# Patient Record
Sex: FEMALE | Race: WHITE | NOT HISPANIC OR LATINO | Employment: OTHER | URBAN - METROPOLITAN AREA
[De-identification: names, ages, dates, MRNs, and addresses within clinical notes are randomized per-mention and may not be internally consistent; named-entity substitution may affect disease eponyms.]

---

## 2023-03-06 ENCOUNTER — TELEPHONE (OUTPATIENT)
Dept: PRIMARY CARE | Facility: CLINIC | Age: 73
End: 2023-03-06
Payer: MEDICARE

## 2023-03-27 NOTE — TELEPHONE ENCOUNTER
3/27- can you please on rx pad write appeal at top with fax number [phone] write pt  and name and PA Ref# [id number] in the body write -- please approve trelegy 100mcg pt has been on since 9/3/2020 is working great for DX J44.9 using albuterol HFA as needed. Changing therapy will interfere with pts treatment. Please ok for pt.  Have  sign and fax pls thanks a ton       Pt needs refill for Trelegy  Per pharmacy put tier exception

## 2023-03-28 PROBLEM — R92.8 ABNORMAL MAMMOGRAM: Status: ACTIVE | Noted: 2023-03-28

## 2023-03-28 PROBLEM — R73.9 HYPERGLYCEMIA: Status: ACTIVE | Noted: 2023-03-28

## 2023-03-28 PROBLEM — J43.9 EMPHYSEMATOUS COPD (MULTI): Status: ACTIVE | Noted: 2023-03-28

## 2023-03-28 PROBLEM — E78.00 ELEVATED LDL CHOLESTEROL LEVEL: Status: ACTIVE | Noted: 2023-03-28

## 2023-03-28 PROBLEM — F41.9 ANXIETY: Status: ACTIVE | Noted: 2023-03-28

## 2023-03-28 PROBLEM — M81.0 OSTEOPOROSIS: Status: ACTIVE | Noted: 2023-03-28

## 2023-03-28 PROBLEM — R73.09 ABNORMAL BLOOD SUGAR: Status: ACTIVE | Noted: 2023-03-28

## 2023-03-28 PROBLEM — I10 BENIGN ESSENTIAL HYPERTENSION: Status: ACTIVE | Noted: 2023-03-28

## 2023-03-28 PROBLEM — J30.9 AR (ALLERGIC RHINITIS): Status: ACTIVE | Noted: 2023-03-28

## 2023-03-28 PROBLEM — M75.52 BURSITIS OF LEFT SHOULDER: Status: ACTIVE | Noted: 2023-03-28

## 2023-03-28 PROBLEM — M54.9 BACK PAIN: Status: ACTIVE | Noted: 2023-03-28

## 2023-03-28 PROBLEM — K21.9 GASTROESOPHAGEAL REFLUX DISEASE: Status: ACTIVE | Noted: 2023-03-28

## 2023-03-28 PROBLEM — I10 CHRONIC HYPERTENSION: Status: ACTIVE | Noted: 2023-03-28

## 2023-03-28 PROBLEM — J44.9 COPD (CHRONIC OBSTRUCTIVE PULMONARY DISEASE) (MULTI): Status: ACTIVE | Noted: 2023-03-28

## 2023-03-28 PROBLEM — R12 HEARTBURN: Status: ACTIVE | Noted: 2023-03-28

## 2023-03-28 RX ORDER — FLUTICASONE PROPIONATE 50 MCG
2 SPRAY, SUSPENSION (ML) NASAL DAILY
COMMUNITY
End: 2023-12-04 | Stop reason: SDUPTHER

## 2023-03-28 RX ORDER — ALBUTEROL SULFATE 90 UG/1
2 AEROSOL, METERED RESPIRATORY (INHALATION) 4 TIMES DAILY
COMMUNITY
Start: 2020-08-27

## 2023-03-28 RX ORDER — DENOSUMAB 60 MG/ML
60 INJECTION SUBCUTANEOUS
COMMUNITY
Start: 2019-02-06

## 2023-03-28 RX ORDER — FLUTICASONE FUROATE, UMECLIDINIUM BROMIDE AND VILANTEROL TRIFENATATE 100; 62.5; 25 UG/1; UG/1; UG/1
1 POWDER RESPIRATORY (INHALATION)
COMMUNITY
Start: 2020-09-03 | End: 2024-01-16

## 2023-03-29 ENCOUNTER — TELEMEDICINE (OUTPATIENT)
Dept: PRIMARY CARE | Facility: CLINIC | Age: 73
End: 2023-03-29
Payer: MEDICARE

## 2023-03-29 VITALS — HEIGHT: 62 IN | BODY MASS INDEX: 21.71 KG/M2 | WEIGHT: 118 LBS

## 2023-03-29 DIAGNOSIS — J01.10 ACUTE FRONTAL SINUSITIS, RECURRENCE NOT SPECIFIED: Primary | ICD-10-CM

## 2023-03-29 PROCEDURE — 99214 OFFICE O/P EST MOD 30 MIN: CPT | Performed by: NURSE PRACTITIONER

## 2023-03-29 RX ORDER — DOXYCYCLINE 100 MG/1
100 CAPSULE ORAL 2 TIMES DAILY
Qty: 20 CAPSULE | Refills: 0 | Status: SHIPPED | OUTPATIENT
Start: 2023-03-29 | End: 2023-04-08

## 2023-03-29 RX ORDER — CODEINE PHOSPHATE AND GUAIFENESIN 10; 100 MG/5ML; MG/5ML
5 SOLUTION ORAL NIGHTLY PRN
Qty: 50 ML | Refills: 0 | Status: SHIPPED | OUTPATIENT
Start: 2023-03-29 | End: 2023-04-03

## 2023-03-29 ASSESSMENT — ENCOUNTER SYMPTOMS
RHINORRHEA: 1
SWEATS: 0
MYALGIAS: 0
SORE THROAT: 0
FEVER: 0
COUGH: 1
CHILLS: 0
HEMOPTYSIS: 0
HEADACHES: 1
SHORTNESS OF BREATH: 1
HEARTBURN: 0
WHEEZING: 1
WEIGHT LOSS: 0

## 2023-03-29 NOTE — PROGRESS NOTES
"Subjective   Patient ID: Humera Villegas is a 72 y.o. female who is present for virtual visit (audio; unable to get video visit to work) for Nasal Congestion and Cough.    Humera is having nasal congestion, cough, headache and productive cough for the past one week. Stated cough is productive of thick yellow phlegm. She has tried Zyrtec for her runny nose without any improvement.     She is requesting short prescription for cough syrup with Codeine as she is not sleeping during the night secondary to cough.     Cough  This is a new problem. The current episode started in the past 7 days. The problem has been unchanged. The problem occurs hourly. The cough is Productive of sputum. Associated symptoms include headaches, nasal congestion, postnasal drip, rhinorrhea, shortness of breath and wheezing. Pertinent negatives include no chest pain, chills, ear congestion, ear pain, fever, heartburn, hemoptysis, myalgias, rash, sore throat, sweats or weight loss. The symptoms are aggravated by lying down.        Review of Systems   Constitutional:  Negative for chills, fever and weight loss.   HENT:  Positive for postnasal drip and rhinorrhea. Negative for ear pain and sore throat.    Respiratory:  Positive for cough, shortness of breath and wheezing. Negative for hemoptysis.    Cardiovascular:  Negative for chest pain.   Gastrointestinal:  Negative for heartburn.   Musculoskeletal:  Negative for myalgias.   Skin:  Negative for rash.   Neurological:  Positive for headaches.       Objective   Ht 1.575 m (5' 2\")   Wt 53.5 kg (118 lb)   BMI 21.58 kg/m²     Physical Exam  Neurological:      Mental Status: She is alert.   Psychiatric:         Thought Content: Thought content normal.         Judgment: Judgment normal.         Assessment/Plan   Problem List Items Addressed This Visit    None  Visit Diagnoses       Acute frontal sinusitis, recurrence not specified    -  Primary    Relevant Medications    doxycycline (Vibramycin) 100 mg " capsule    codeine-guaifenesin (Robitussin-AC)  mg/5 mL syrup          1) Acute sinusitis: you were prescribed Doxycycline and codeine cough syrup. You were instructed to stay well hydrated with fluids. Recommend OTC saline nasal spray, antihistamine such as Cetirizine or Loratadine and cool mist humidifier at bedtime. Instructed you on good hand hygiene. May take OTC Tylenol Extra Strength or NSAIDS for fever, discomfort or pain. Follow-up if no improvement or if symptoms worsen.

## 2023-04-13 ENCOUNTER — TELEPHONE (OUTPATIENT)
Dept: PRIMARY CARE | Facility: CLINIC | Age: 73
End: 2023-04-13
Payer: MEDICARE

## 2023-05-16 ENCOUNTER — OFFICE VISIT (OUTPATIENT)
Dept: PRIMARY CARE | Facility: CLINIC | Age: 73
End: 2023-05-16
Payer: MEDICARE

## 2023-05-16 VITALS
DIASTOLIC BLOOD PRESSURE: 78 MMHG | BODY MASS INDEX: 21.35 KG/M2 | SYSTOLIC BLOOD PRESSURE: 130 MMHG | WEIGHT: 116 LBS | HEIGHT: 62 IN

## 2023-05-16 DIAGNOSIS — Z00.00 ROUTINE GENERAL MEDICAL EXAMINATION AT HEALTH CARE FACILITY: ICD-10-CM

## 2023-05-16 DIAGNOSIS — Z87.891 FORMER SMOKER: ICD-10-CM

## 2023-05-16 DIAGNOSIS — Z13.89 SCREENING FOR MULTIPLE CONDITIONS: ICD-10-CM

## 2023-05-16 DIAGNOSIS — M81.0 MENOPAUSAL OSTEOPOROSIS: ICD-10-CM

## 2023-05-16 DIAGNOSIS — Z12.31 ENCOUNTER FOR SCREENING MAMMOGRAM FOR BREAST CANCER: ICD-10-CM

## 2023-05-16 DIAGNOSIS — E78.00 ELEVATED LDL CHOLESTEROL LEVEL: ICD-10-CM

## 2023-05-16 DIAGNOSIS — Z12.31 VISIT FOR SCREENING MAMMOGRAM: Primary | ICD-10-CM

## 2023-05-16 DIAGNOSIS — K21.9 GASTROESOPHAGEAL REFLUX DISEASE WITHOUT ESOPHAGITIS: ICD-10-CM

## 2023-05-16 DIAGNOSIS — J43.2 CENTRILOBULAR EMPHYSEMA (MULTI): ICD-10-CM

## 2023-05-16 DIAGNOSIS — I10 BENIGN ESSENTIAL HYPERTENSION: ICD-10-CM

## 2023-05-16 PROBLEM — N76.3 CHRONIC VULVITIS: Status: ACTIVE | Noted: 2017-07-06

## 2023-05-16 PROCEDURE — 1036F TOBACCO NON-USER: CPT | Performed by: FAMILY MEDICINE

## 2023-05-16 PROCEDURE — 3078F DIAST BP <80 MM HG: CPT | Performed by: FAMILY MEDICINE

## 2023-05-16 PROCEDURE — 1159F MED LIST DOCD IN RCRD: CPT | Performed by: FAMILY MEDICINE

## 2023-05-16 PROCEDURE — 1160F RVW MEDS BY RX/DR IN RCRD: CPT | Performed by: FAMILY MEDICINE

## 2023-05-16 PROCEDURE — 1170F FXNL STATUS ASSESSED: CPT | Performed by: FAMILY MEDICINE

## 2023-05-16 PROCEDURE — G0444 DEPRESSION SCREEN ANNUAL: HCPCS | Performed by: FAMILY MEDICINE

## 2023-05-16 PROCEDURE — 99214 OFFICE O/P EST MOD 30 MIN: CPT | Performed by: FAMILY MEDICINE

## 2023-05-16 PROCEDURE — 3075F SYST BP GE 130 - 139MM HG: CPT | Performed by: FAMILY MEDICINE

## 2023-05-16 PROCEDURE — G0439 PPPS, SUBSEQ VISIT: HCPCS | Performed by: FAMILY MEDICINE

## 2023-05-21 ASSESSMENT — ACTIVITIES OF DAILY LIVING (ADL)
DOING_HOUSEWORK: INDEPENDENT
TAKING_MEDICATION: INDEPENDENT
GROCERY_SHOPPING: INDEPENDENT
DRESSING: INDEPENDENT
BATHING: INDEPENDENT
MANAGING_FINANCES: INDEPENDENT

## 2023-05-21 ASSESSMENT — ENCOUNTER SYMPTOMS
PSYCHIATRIC NEGATIVE: 1
VOMITING: 0
CHILLS: 0
NEUROLOGICAL NEGATIVE: 1
ENDOCRINE NEGATIVE: 1
HEMATOLOGIC/LYMPHATIC NEGATIVE: 1
RESPIRATORY NEGATIVE: 1
MUSCULOSKELETAL NEGATIVE: 1
FEVER: 0
ALLERGIC/IMMUNOLOGIC NEGATIVE: 1
NAUSEA: 0
GASTROINTESTINAL NEGATIVE: 1
CARDIOVASCULAR NEGATIVE: 1

## 2023-05-21 ASSESSMENT — PATIENT HEALTH QUESTIONNAIRE - PHQ9
2. FEELING DOWN, DEPRESSED OR HOPELESS: NOT AT ALL
SUM OF ALL RESPONSES TO PHQ9 QUESTIONS 1 AND 2: 0
1. LITTLE INTEREST OR PLEASURE IN DOING THINGS: NOT AT ALL

## 2023-05-21 NOTE — PROGRESS NOTES
"Subjective   Reason for Visit: Humera Villegas is an 72 y.o. female here for a Medicare Wellness visit.          Reviewed all medications by prescribing practitioner or clinical pharmacist (such as prescriptions, OTCs, herbal therapies and supplements) and documented in the medical record.    HPI patient is here for follow-up  Has had a hard time getting trilogy very expensive and current insurance      Patient Care Team:  Fabio Chou MD as PCP - General     Review of Systems   Constitutional:  Negative for chills and fever.   HENT: Negative.     Respiratory: Negative.     Cardiovascular: Negative.    Gastrointestinal: Negative.  Negative for nausea and vomiting.   Endocrine: Negative.    Genitourinary: Negative.    Musculoskeletal: Negative.    Skin: Negative.  Negative for rash.   Allergic/Immunologic: Negative.    Neurological: Negative.    Hematological: Negative.    Psychiatric/Behavioral: Negative.     All other systems reviewed and are negative.      Objective   Vitals:  Blood Pressure 130/78   Height 1.575 m (5' 2\")   Weight 52.6 kg (116 lb)   Body Mass Index 21.22 kg/m²       Physical Exam  Vitals and nursing note reviewed.   Constitutional:       Appearance: Normal appearance.   HENT:      Head: Normocephalic and atraumatic.      Right Ear: Tympanic membrane normal.      Left Ear: Tympanic membrane normal.      Nose: Nose normal.      Mouth/Throat:      Mouth: Mucous membranes are moist.   Eyes:      Pupils: Pupils are equal, round, and reactive to light.   Cardiovascular:      Rate and Rhythm: Normal rate and regular rhythm.      Pulses: Normal pulses.      Heart sounds: Normal heart sounds.   Pulmonary:      Effort: Pulmonary effort is normal.      Breath sounds: Normal breath sounds.   Abdominal:      General: Abdomen is flat. Bowel sounds are normal.      Palpations: Abdomen is soft.   Musculoskeletal:         General: Normal range of motion.      Cervical back: Normal range of motion and neck " "supple.   Skin:     General: Skin is warm and dry.      Capillary Refill: Capillary refill takes less than 2 seconds.   Neurological:      General: No focal deficit present.      Mental Status: She is alert and oriented to person, place, and time.   Psychiatric:         Mood and Affect: Mood normal.         Assessment/Plan   Problem List Items Addressed This Visit       Benign essential hypertension    Current Assessment & Plan     Continue medication         COPD (chronic obstructive pulmonary disease) (CMS/[facility])    Current Assessment & Plan     Continue inhalers         Gastroesophageal reflux disease    Elevated LDL cholesterol level    Menopausal osteoporosis     Other Visit Diagnoses       Visit for screening mammogram    -  Primary    Relevant Orders    BI mammo bilateral screening tomosynthesis    Screening for multiple conditions        Former smoker        Encounter for screening mammogram for breast cancer        Routine general medical examination at health care facility               spent 15 minutes obtaining and discussing depression screening using PHQ-2 questions with results documented in chart.”  (If screen positive: \"The screen indicated potential depression and PHQ-9 was obtained with treatment and referral plan discussed      " normal range of motion

## 2023-05-25 ENCOUNTER — LAB (OUTPATIENT)
Dept: LAB | Facility: LAB | Age: 73
End: 2023-05-25
Payer: MEDICARE

## 2023-05-25 DIAGNOSIS — M81.0 AGE-RELATED OSTEOPOROSIS WITHOUT CURRENT PATHOLOGICAL FRACTURE: ICD-10-CM

## 2023-05-25 DIAGNOSIS — I10 ESSENTIAL (PRIMARY) HYPERTENSION: ICD-10-CM

## 2023-05-25 DIAGNOSIS — E78.00 ELEVATED LDL CHOLESTEROL LEVEL: ICD-10-CM

## 2023-05-25 DIAGNOSIS — I10 BENIGN ESSENTIAL HYPERTENSION: ICD-10-CM

## 2023-05-25 DIAGNOSIS — R73.09 ABNORMAL BLOOD SUGAR: ICD-10-CM

## 2023-05-25 DIAGNOSIS — I10 BENIGN ESSENTIAL HYPERTENSION: Primary | ICD-10-CM

## 2023-05-25 LAB
ALANINE AMINOTRANSFERASE (SGPT) (U/L) IN SER/PLAS: 14 U/L (ref 7–45)
ALBUMIN (G/DL) IN SER/PLAS: 4.2 G/DL (ref 3.4–5)
ALKALINE PHOSPHATASE (U/L) IN SER/PLAS: 57 U/L (ref 33–136)
ANION GAP IN SER/PLAS: 17 MMOL/L (ref 10–20)
ASPARTATE AMINOTRANSFERASE (SGOT) (U/L) IN SER/PLAS: 20 U/L (ref 9–39)
BILIRUBIN TOTAL (MG/DL) IN SER/PLAS: 0.9 MG/DL (ref 0–1.2)
CALCIUM (MG/DL) IN SER/PLAS: 10.4 MG/DL (ref 8.6–10.6)
CARBON DIOXIDE, TOTAL (MMOL/L) IN SER/PLAS: 23 MMOL/L (ref 21–32)
CHLORIDE (MMOL/L) IN SER/PLAS: 102 MMOL/L (ref 98–107)
CHOLESTEROL (MG/DL) IN SER/PLAS: 184 MG/DL (ref 0–199)
CHOLESTEROL IN HDL (MG/DL) IN SER/PLAS: 75.1 MG/DL
CHOLESTEROL/HDL RATIO: 2.5
COBALAMIN (VITAMIN B12) (PG/ML) IN SER/PLAS: 368 PG/ML (ref 211–911)
CREATININE (MG/DL) IN SER/PLAS: 0.75 MG/DL (ref 0.5–1.05)
ERYTHROCYTE DISTRIBUTION WIDTH (RATIO) BY AUTOMATED COUNT: 13.6 % (ref 11.5–14.5)
ERYTHROCYTE MEAN CORPUSCULAR HEMOGLOBIN CONCENTRATION (G/DL) BY AUTOMATED: 32.4 G/DL (ref 32–36)
ERYTHROCYTE MEAN CORPUSCULAR VOLUME (FL) BY AUTOMATED COUNT: 98 FL (ref 80–100)
ERYTHROCYTES (10*6/UL) IN BLOOD BY AUTOMATED COUNT: 4.74 X10E12/L (ref 4–5.2)
ESTIMATED AVERAGE GLUCOSE FOR HBA1C: 131 MG/DL
GFR FEMALE: 83 ML/MIN/1.73M2
GLUCOSE (MG/DL) IN SER/PLAS: 99 MG/DL (ref 74–99)
HEMATOCRIT (%) IN BLOOD BY AUTOMATED COUNT: 46.6 % (ref 36–46)
HEMOGLOBIN (G/DL) IN BLOOD: 15.1 G/DL (ref 12–16)
HEMOGLOBIN A1C/HEMOGLOBIN TOTAL IN BLOOD: 6.2 %
LDL: 90 MG/DL (ref 0–99)
LEUKOCYTES (10*3/UL) IN BLOOD BY AUTOMATED COUNT: 5.7 X10E9/L (ref 4.4–11.3)
NRBC (PER 100 WBCS) BY AUTOMATED COUNT: 0 /100 WBC (ref 0–0)
PLATELETS (10*3/UL) IN BLOOD AUTOMATED COUNT: 200 X10E9/L (ref 150–450)
POTASSIUM (MMOL/L) IN SER/PLAS: 4.5 MMOL/L (ref 3.5–5.3)
PROTEIN TOTAL: 8.2 G/DL (ref 6.4–8.2)
SODIUM (MMOL/L) IN SER/PLAS: 137 MMOL/L (ref 136–145)
TRIGLYCERIDE (MG/DL) IN SER/PLAS: 96 MG/DL (ref 0–149)
UREA NITROGEN (MG/DL) IN SER/PLAS: 14 MG/DL (ref 6–23)
VLDL: 19 MG/DL (ref 0–40)

## 2023-05-25 PROCEDURE — 82607 VITAMIN B-12: CPT

## 2023-05-25 PROCEDURE — 83036 HEMOGLOBIN GLYCOSYLATED A1C: CPT

## 2023-05-25 PROCEDURE — 85027 COMPLETE CBC AUTOMATED: CPT

## 2023-05-25 PROCEDURE — 80053 COMPREHEN METABOLIC PANEL: CPT

## 2023-05-25 PROCEDURE — 80061 LIPID PANEL: CPT

## 2023-05-25 PROCEDURE — 36415 COLL VENOUS BLD VENIPUNCTURE: CPT

## 2023-07-19 DIAGNOSIS — I10 HYPERTENSION, UNSPECIFIED TYPE: ICD-10-CM

## 2023-07-19 DIAGNOSIS — E78.5 DYSLIPIDEMIA: ICD-10-CM

## 2023-07-20 RX ORDER — ATORVASTATIN CALCIUM 20 MG/1
20 TABLET, FILM COATED ORAL DAILY
Qty: 90 TABLET | Refills: 1 | Status: SHIPPED | OUTPATIENT
Start: 2023-07-20 | End: 2023-12-12

## 2023-07-20 RX ORDER — AMLODIPINE BESYLATE 2.5 MG/1
2.5 TABLET ORAL DAILY
Qty: 90 TABLET | Refills: 1 | Status: SHIPPED | OUTPATIENT
Start: 2023-07-20 | End: 2023-12-12

## 2023-08-30 DIAGNOSIS — K21.00 GASTROESOPHAGEAL REFLUX DISEASE WITH ESOPHAGITIS, UNSPECIFIED WHETHER HEMORRHAGE: ICD-10-CM

## 2023-08-31 RX ORDER — OMEPRAZOLE 20 MG/1
20 CAPSULE, DELAYED RELEASE ORAL DAILY
Qty: 90 CAPSULE | Refills: 1 | Status: SHIPPED | OUTPATIENT
Start: 2023-08-31 | End: 2024-01-16

## 2023-11-21 ENCOUNTER — APPOINTMENT (OUTPATIENT)
Dept: PRIMARY CARE | Facility: CLINIC | Age: 73
End: 2023-11-21
Payer: MEDICARE

## 2023-12-04 ENCOUNTER — OFFICE VISIT (OUTPATIENT)
Dept: PRIMARY CARE | Facility: CLINIC | Age: 73
End: 2023-12-04
Payer: MEDICARE

## 2023-12-04 VITALS
WEIGHT: 117 LBS | HEART RATE: 114 BPM | SYSTOLIC BLOOD PRESSURE: 137 MMHG | DIASTOLIC BLOOD PRESSURE: 78 MMHG | BODY MASS INDEX: 21.4 KG/M2

## 2023-12-04 DIAGNOSIS — S39.012A STRAIN OF LUMBAR REGION, INITIAL ENCOUNTER: Primary | ICD-10-CM

## 2023-12-04 DIAGNOSIS — J30.9 ALLERGIC RHINITIS, UNSPECIFIED SEASONALITY, UNSPECIFIED TRIGGER: ICD-10-CM

## 2023-12-04 DIAGNOSIS — E78.00 ELEVATED LDL CHOLESTEROL LEVEL: ICD-10-CM

## 2023-12-04 DIAGNOSIS — Z12.31 VISIT FOR SCREENING MAMMOGRAM: ICD-10-CM

## 2023-12-04 DIAGNOSIS — I10 BENIGN ESSENTIAL HYPERTENSION: ICD-10-CM

## 2023-12-04 DIAGNOSIS — R01.1 SYSTOLIC EJECTION MURMUR: ICD-10-CM

## 2023-12-04 DIAGNOSIS — R09.89 BILATERAL CAROTID BRUITS: ICD-10-CM

## 2023-12-04 DIAGNOSIS — R73.09 ABNORMAL BLOOD SUGAR: ICD-10-CM

## 2023-12-04 LAB
ALBUMIN SERPL BCP-MCNC: 4.4 G/DL (ref 3.4–5)
ALP SERPL-CCNC: 53 U/L (ref 33–136)
ALT SERPL W P-5'-P-CCNC: 17 U/L (ref 7–45)
ANION GAP SERPL CALC-SCNC: 17 MMOL/L (ref 10–20)
AST SERPL W P-5'-P-CCNC: 22 U/L (ref 9–39)
BILIRUB SERPL-MCNC: 0.8 MG/DL (ref 0–1.2)
BUN SERPL-MCNC: 21 MG/DL (ref 6–23)
CALCIUM SERPL-MCNC: 9.9 MG/DL (ref 8.6–10.6)
CHLORIDE SERPL-SCNC: 101 MMOL/L (ref 98–107)
CHOLEST SERPL-MCNC: 193 MG/DL (ref 0–199)
CHOLESTEROL/HDL RATIO: 2.4
CO2 SERPL-SCNC: 21 MMOL/L (ref 21–32)
CREAT SERPL-MCNC: 0.81 MG/DL (ref 0.5–1.05)
GFR SERPL CREATININE-BSD FRML MDRD: 76 ML/MIN/1.73M*2
GLUCOSE SERPL-MCNC: 138 MG/DL (ref 74–99)
HDLC SERPL-MCNC: 78.9 MG/DL
LDLC SERPL CALC-MCNC: 99 MG/DL
NON HDL CHOLESTEROL: 114 MG/DL (ref 0–149)
POC HEMOGLOBIN A1C: 5.9 % (ref 4.2–6.5)
POTASSIUM SERPL-SCNC: 4.2 MMOL/L (ref 3.5–5.3)
PROT SERPL-MCNC: 7.7 G/DL (ref 6.4–8.2)
SODIUM SERPL-SCNC: 135 MMOL/L (ref 136–145)
TRIGL SERPL-MCNC: 78 MG/DL (ref 0–149)
VLDL: 16 MG/DL (ref 0–40)

## 2023-12-04 PROCEDURE — 3078F DIAST BP <80 MM HG: CPT | Performed by: FAMILY MEDICINE

## 2023-12-04 PROCEDURE — 83036 HEMOGLOBIN GLYCOSYLATED A1C: CPT | Performed by: FAMILY MEDICINE

## 2023-12-04 PROCEDURE — 80053 COMPREHEN METABOLIC PANEL: CPT

## 2023-12-04 PROCEDURE — 3075F SYST BP GE 130 - 139MM HG: CPT | Performed by: FAMILY MEDICINE

## 2023-12-04 PROCEDURE — 99214 OFFICE O/P EST MOD 30 MIN: CPT | Performed by: FAMILY MEDICINE

## 2023-12-04 PROCEDURE — 1036F TOBACCO NON-USER: CPT | Performed by: FAMILY MEDICINE

## 2023-12-04 PROCEDURE — 1159F MED LIST DOCD IN RCRD: CPT | Performed by: FAMILY MEDICINE

## 2023-12-04 PROCEDURE — 36415 COLL VENOUS BLD VENIPUNCTURE: CPT

## 2023-12-04 PROCEDURE — 80061 LIPID PANEL: CPT

## 2023-12-04 PROCEDURE — 1160F RVW MEDS BY RX/DR IN RCRD: CPT | Performed by: FAMILY MEDICINE

## 2023-12-04 RX ORDER — GABAPENTIN 100 MG/1
100 CAPSULE ORAL 3 TIMES DAILY
Qty: 90 CAPSULE | Refills: 0 | Status: SHIPPED | OUTPATIENT
Start: 2023-12-04 | End: 2024-06-01

## 2023-12-04 RX ORDER — TIZANIDINE 2 MG/1
2 TABLET ORAL EVERY 6 HOURS PRN
Qty: 30 TABLET | Refills: 0 | Status: SHIPPED | OUTPATIENT
Start: 2023-12-04 | End: 2023-12-14

## 2023-12-04 RX ORDER — FLUTICASONE PROPIONATE 50 MCG
2 SPRAY, SUSPENSION (ML) NASAL DAILY
Qty: 16 G | Refills: 2 | Status: SHIPPED | OUTPATIENT
Start: 2023-12-04

## 2023-12-04 ASSESSMENT — PATIENT HEALTH QUESTIONNAIRE - PHQ9
2. FEELING DOWN, DEPRESSED OR HOPELESS: NOT AT ALL
1. LITTLE INTEREST OR PLEASURE IN DOING THINGS: NOT AT ALL
SUM OF ALL RESPONSES TO PHQ9 QUESTIONS 1 AND 2: 0

## 2023-12-04 ASSESSMENT — ENCOUNTER SYMPTOMS
OCCASIONAL FEELINGS OF UNSTEADINESS: 0
DEPRESSION: 0
LOSS OF SENSATION IN FEET: 0

## 2023-12-05 RX ORDER — FLUTICASONE PROPIONATE 50 MCG
SPRAY, SUSPENSION (ML) NASAL
Qty: 48 G | Refills: 1 | OUTPATIENT
Start: 2023-12-05

## 2023-12-10 NOTE — PROGRESS NOTES
Chief Complaint/HPI:  Patient with history of hypertension hyperlipidemia COPD taking meds as prescribed has been getting Prolia for osteoporosis  Pressures controlled states she has her frequently when he knows nasal drainage is clear occasional cough  Has had frequent blood.  He has taken gabapentin in the past and tolerated that well    ROS otherwise negative aside from what was mentioned above in HPI.      Patient Active Problem List   Diagnosis    Abnormal mammogram    Anxiety    Back pain    Benign essential hypertension    Bursitis of left shoulder    Chronic hypertension    COPD (chronic obstructive pulmonary disease) (CMS/HCC)    Emphysematous COPD (CMS/HCC)    Abnormal blood sugar    AR (allergic rhinitis)    Gastroesophageal reflux disease    Heartburn    Elevated LDL cholesterol level    Hyperglycemia    Osteoporosis    Chronic vulvitis    Menopausal osteoporosis    Bilateral carotid bruits    Systolic ejection murmur    Visit for screening mammogram    Strain of lumbar region     Past Medical History:   Diagnosis Date    Compression fracture of thoracic vertebra (CMS/HCC) 06/04/2012    Formatting of this note might be different from the original. T8    Encounter for screening for malignant neoplasm of colon     Encounter for screening colonoscopy    Encounter for screening for malignant neoplasm of vagina     Vaginal Pap smear    Other conditions influencing health status     Compression fracture    Pain in thoracic spine 06/04/2012    Personal history of other diseases of the digestive system     History of hiatal hernia    Personal history of other endocrine, nutritional and metabolic disease     History of hypercholesterolemia    Personal history of other medical treatment     History of screening mammography    Strain of muscle and tendon of back wall of thorax, initial encounter     Strain of thoracic spine    Wedge compression fracture of unspecified thoracic vertebra, initial encounter for closed  fracture (CMS/HCC)     Thoracic compression fracture     Past Surgical History:   Procedure Laterality Date    OTHER SURGICAL HISTORY  10/30/2014    Colonoscopy (Fiberoptic) Screening     Family History   Problem Relation Name Age of Onset    Other (cardiac disorder) Mother      Diabetes Mother      Other (cardiac disorder) Father       Social History     Tobacco Use    Smoking status: Never    Smokeless tobacco: Never   Substance Use Topics    Alcohol use: Never    Drug use: Never         ALLERGIES  Allergies   Allergen Reactions    Amoxicillin Unknown    Penicillin Other         MEDICATIONS  Current Outpatient Medications   Medication Sig Dispense Refill    albuterol 90 mcg/actuation inhaler Inhale 2 puffs 4 times a day.      amLODIPine (Norvasc) 2.5 mg tablet TAKE 1 TABLET BY MOUTH ONCE  DAILY 90 tablet 1    atorvastatin (Lipitor) 20 mg tablet TAKE 1 TABLET BY MOUTH ONCE  DAILY 90 tablet 1    denosumab (Prolia) 60 mg/mL syringe Inject 1 mL (60 mg) under the skin every 6 months.      omeprazole (PriLOSEC) 20 mg DR capsule TAKE 1 CAPSULE BY MOUTH ONCE  DAILY 90 capsule 1    Trelegy Ellipta 100-62.5-25 mcg blister with device Inhale 1 puff once daily.      fluticasone (Flonase) 50 mcg/actuation nasal spray Administer 2 sprays into each nostril once daily. Shake gently. Before first use, prime pump. After use, clean tip and replace cap. 16 g 2    gabapentin (Neurontin) 100 mg capsule Take 1 capsule (100 mg) by mouth 3 times a day. 90 capsule 0    tiZANidine (Zanaflex) 2 mg tablet Take 1 tablet (2 mg) by mouth every 6 hours if needed for muscle spasms for up to 10 days. 30 tablet 0     No current facility-administered medications for this visit.         PHYSICAL EXAM  Visit Vitals  Blood Pressure 137/78   Pulse (Abnormal) 114     .FLOWAMB[11   .FLOWAMB[14   Body mass index is 21.4 kg/m².  Gen: Alert, NAD  HEENT:  PERRLA, EOMI, conjunctiva and sclera normal in appearance  Respiratory:  Lungs CTAB  Cardiovascular:   Heart RRR. No M/R/G  Neuro:  Gross motor and sensory intact  Skin:  No suspicious lesions present    ASSESSMENT/PLAN  Problem List Items Addressed This Visit       Benign essential hypertension    Abnormal blood sugar    Relevant Orders    POCT glycosylated hemoglobin (Hb A1C) manually resulted (Completed)    AR (allergic rhinitis)    Relevant Medications    fluticasone (Flonase) 50 mcg/actuation nasal spray    Elevated LDL cholesterol level    Relevant Orders    Comprehensive Metabolic Panel (Completed)    Lipid Panel (Completed)    Bilateral carotid bruits    Relevant Orders    Vascular US Carotid Artery Duplex Bilateral    Systolic ejection murmur    Relevant Orders    Transthoracic Echo (TTE) Complete    Visit for screening mammogram    Relevant Orders    BI mammo bilateral screening tomosynthesis    Strain of lumbar region - Primary    Relevant Medications    tiZANidine (Zanaflex) 2 mg tablet    gabapentin (Neurontin) 100 mg capsule           Fabio Chou MD

## 2023-12-12 DIAGNOSIS — I10 HYPERTENSION, UNSPECIFIED TYPE: ICD-10-CM

## 2023-12-12 DIAGNOSIS — E78.5 DYSLIPIDEMIA: ICD-10-CM

## 2023-12-12 RX ORDER — ATORVASTATIN CALCIUM 20 MG/1
20 TABLET, FILM COATED ORAL DAILY
Qty: 90 TABLET | Refills: 1 | Status: SHIPPED | OUTPATIENT
Start: 2023-12-12 | End: 2024-01-16

## 2023-12-12 RX ORDER — AMLODIPINE BESYLATE 2.5 MG/1
2.5 TABLET ORAL DAILY
Qty: 90 TABLET | Refills: 1 | Status: SHIPPED | OUTPATIENT
Start: 2023-12-12 | End: 2024-01-16

## 2023-12-18 ENCOUNTER — APPOINTMENT (OUTPATIENT)
Dept: RADIOLOGY | Facility: CLINIC | Age: 73
End: 2023-12-18
Payer: MEDICARE

## 2023-12-18 ENCOUNTER — APPOINTMENT (OUTPATIENT)
Dept: CARDIOLOGY | Facility: HOSPITAL | Age: 73
End: 2023-12-18
Payer: MEDICARE

## 2023-12-27 ENCOUNTER — HOSPITAL ENCOUNTER (OUTPATIENT)
Dept: CARDIOLOGY | Facility: CLINIC | Age: 73
Discharge: HOME | End: 2023-12-27
Payer: MEDICARE

## 2023-12-27 ENCOUNTER — HOSPITAL ENCOUNTER (OUTPATIENT)
Dept: VASCULAR MEDICINE | Facility: CLINIC | Age: 73
Discharge: HOME | End: 2023-12-27
Payer: MEDICARE

## 2023-12-27 DIAGNOSIS — R01.1 SYSTOLIC EJECTION MURMUR: ICD-10-CM

## 2023-12-27 DIAGNOSIS — R09.89 BILATERAL CAROTID BRUITS: ICD-10-CM

## 2023-12-27 LAB
AORTIC VALVE MEAN GRADIENT: 23.2
AORTIC VALVE PEAK VELOCITY: 3.06
AV PEAK GRADIENT: 37.5
AVA (PEAK VEL): 1.17
AVA (VTI): 1.31
EJECTION FRACTION APICAL 4 CHAMBER: 72.3
EJECTION FRACTION: 70
LEFT ATRIUM VOLUME AREA LENGTH INDEX BSA: 18.2
LEFT VENTRICLE INTERNAL DIMENSION DIASTOLE: 3.58 (ref 3.5–6)
LEFT VENTRICULAR OUTFLOW TRACT DIAMETER: 2.2
MITRAL VALVE E/A RATIO: 0.86
MITRAL VALVE E/E' RATIO: 7.34
RIGHT VENTRICLE FREE WALL PEAK S': 11
RIGHT VENTRICLE PEAK SYSTOLIC PRESSURE: 66.4
TRICUSPID ANNULAR PLANE SYSTOLIC EXCURSION: 2

## 2023-12-27 PROCEDURE — 93306 TTE W/DOPPLER COMPLETE: CPT | Performed by: INTERNAL MEDICINE

## 2023-12-27 PROCEDURE — 93880 EXTRACRANIAL BILAT STUDY: CPT

## 2023-12-27 PROCEDURE — 93880 EXTRACRANIAL BILAT STUDY: CPT | Performed by: INTERNAL MEDICINE

## 2023-12-27 PROCEDURE — 93306 TTE W/DOPPLER COMPLETE: CPT

## 2024-01-01 ENCOUNTER — TELEPHONE (OUTPATIENT)
Dept: PRIMARY CARE | Facility: CLINIC | Age: 74
End: 2024-01-01
Payer: MEDICARE

## 2024-01-01 DIAGNOSIS — R93.1 ABNORMAL ECHOCARDIOGRAM: ICD-10-CM

## 2024-01-02 NOTE — TELEPHONE ENCOUNTER
----- Message from Fabio Chou MD sent at 1/1/2024  5:33 PM EST -----  Please call the patient regarding her abnormal result.  Moderate tightening of Aortic valve   Stiffness of the heart muscle  Refer to cardiology for further evaluation.   Will need to repeat ECHO in 1 year to recheck the heart valave

## 2024-01-16 DIAGNOSIS — K21.00 GASTROESOPHAGEAL REFLUX DISEASE WITH ESOPHAGITIS, UNSPECIFIED WHETHER HEMORRHAGE: ICD-10-CM

## 2024-01-16 DIAGNOSIS — J44.9 CHRONIC OBSTRUCTIVE PULMONARY DISEASE, UNSPECIFIED COPD TYPE (MULTI): ICD-10-CM

## 2024-01-16 DIAGNOSIS — E78.5 DYSLIPIDEMIA: ICD-10-CM

## 2024-01-16 DIAGNOSIS — I10 HYPERTENSION, UNSPECIFIED TYPE: ICD-10-CM

## 2024-01-16 RX ORDER — AMLODIPINE BESYLATE 2.5 MG/1
2.5 TABLET ORAL DAILY
Qty: 90 TABLET | Refills: 1 | Status: SHIPPED | OUTPATIENT
Start: 2024-01-16

## 2024-01-16 RX ORDER — FLUTICASONE FUROATE, UMECLIDINIUM BROMIDE AND VILANTEROL TRIFENATATE 100; 62.5; 25 UG/1; UG/1; UG/1
1 POWDER RESPIRATORY (INHALATION) DAILY
Qty: 3 EACH | Refills: 1 | Status: SHIPPED | OUTPATIENT
Start: 2024-01-16 | End: 2024-01-31

## 2024-01-16 RX ORDER — OMEPRAZOLE 20 MG/1
20 CAPSULE, DELAYED RELEASE ORAL DAILY
Qty: 90 CAPSULE | Refills: 1 | Status: SHIPPED | OUTPATIENT
Start: 2024-01-16

## 2024-01-16 RX ORDER — ATORVASTATIN CALCIUM 20 MG/1
20 TABLET, FILM COATED ORAL DAILY
Qty: 90 TABLET | Refills: 1 | Status: SHIPPED | OUTPATIENT
Start: 2024-01-16

## 2024-01-31 DIAGNOSIS — J44.9 CHRONIC OBSTRUCTIVE PULMONARY DISEASE, UNSPECIFIED COPD TYPE (MULTI): ICD-10-CM

## 2024-01-31 RX ORDER — FLUTICASONE FUROATE, UMECLIDINIUM BROMIDE AND VILANTEROL TRIFENATATE 100; 62.5; 25 UG/1; UG/1; UG/1
1 POWDER RESPIRATORY (INHALATION) DAILY
Qty: 180 EACH | Refills: 1 | Status: SHIPPED | OUTPATIENT
Start: 2024-01-31

## 2024-04-08 ENCOUNTER — OFFICE VISIT (OUTPATIENT)
Dept: ORTHOPEDIC SURGERY | Facility: CLINIC | Age: 74
End: 2024-04-08
Payer: MEDICARE

## 2024-04-08 VITALS — BODY MASS INDEX: 21.53 KG/M2 | HEIGHT: 62 IN | WEIGHT: 117 LBS

## 2024-04-08 DIAGNOSIS — G89.29 CHRONIC MIDLINE THORACIC BACK PAIN: Primary | ICD-10-CM

## 2024-04-08 DIAGNOSIS — M54.6 CHRONIC MIDLINE THORACIC BACK PAIN: Primary | ICD-10-CM

## 2024-04-08 PROCEDURE — 1160F RVW MEDS BY RX/DR IN RCRD: CPT | Performed by: PHYSICIAN ASSISTANT

## 2024-04-08 PROCEDURE — 1036F TOBACCO NON-USER: CPT | Performed by: PHYSICIAN ASSISTANT

## 2024-04-08 PROCEDURE — 99203 OFFICE O/P NEW LOW 30 MIN: CPT | Performed by: PHYSICIAN ASSISTANT

## 2024-04-08 PROCEDURE — 1159F MED LIST DOCD IN RCRD: CPT | Performed by: PHYSICIAN ASSISTANT

## 2024-04-08 RX ORDER — METHOCARBAMOL 500 MG/1
TABLET, FILM COATED ORAL
Qty: 60 TABLET | Refills: 2 | Status: SHIPPED | OUTPATIENT
Start: 2024-04-08

## 2024-04-08 RX ORDER — MELOXICAM 15 MG/1
15 TABLET ORAL DAILY
Qty: 30 TABLET | Refills: 3 | Status: SHIPPED | OUTPATIENT
Start: 2024-04-08

## 2024-04-08 ASSESSMENT — PAIN - FUNCTIONAL ASSESSMENT: PAIN_FUNCTIONAL_ASSESSMENT: 0-10

## 2024-04-08 NOTE — PROGRESS NOTES
Humera is a 73-year-old female reporting clinic today for evaluation of her thoracic back pain.    Her symptoms started approximately 1 month ago, she denies injury or trauma.  She does think her symptoms are related to her increased level of stress recently.  Her pain is located around the thoracolumbar junction and radiates up between her shoulder blades and under her rib cage.  She describes this pain mostly as spasms.  Her symptoms are increased with activity and decreased with rest.  Her legs have started to feel weak which she attributes to mostly sitting for the last 4 weeks.  She does not have pain radiating down her arms or legs.  No numbness and tingling in her arms or legs.  She has full control of her bowel and bladder.    Treatment thus far has consisted of chiropractic care.    Family, social, and medical histories are obtained and reviewed.    ROS: All other systems have been reviewed and are negative except as previously noted in history of present illness.    Physical Exam:  Const: Well-appearing, well-nourished female in no distress.  Eyes: Normal appearing sclera and conjunctiva, no jaundice, pupils normal in appearance.  Resp: breathing comfortably, normal respiratory rate.  CV: No upper or lower extremity edema.  Musculoskeletal: Normal gait.  Lumbar ROM is supple.  Strength exam of the lower extremities reveals 5/5 strength in all major muscle groups.  Negative straight leg raise bilaterally.  Neuro: Sensation is intact and equal bilaterally. Deep tendon reflexes are normal and symmetric.  No clonus.  Skin: Intact without any lesions, normal turgor.  Psych: Alert and oriented x3, normal mood and affect.    The plan is to start conservative treatment for her thoracic muscle spasms.  A referral for physical therapy was provided today.  Prescriptions for meloxicam 15 mg and methocarbamol 500 mg were sent to her pharmacy for inflammation and muscle spasms.  I am optimistic her symptoms will improve  with conservative treatment.  If she does not see improvement after 6 weeks physical therapy she can follow-up with me.    **This note was dictated using speech recognition software and was not corrected for spelling or grammatical errors**

## 2024-04-16 ENCOUNTER — TELEPHONE (OUTPATIENT)
Dept: ORTHOPEDIC SURGERY | Facility: HOSPITAL | Age: 74
End: 2024-04-16
Payer: MEDICARE

## 2024-04-16 NOTE — TELEPHONE ENCOUNTER
Patient called stating she had been taking meds to a week with no relief and is not sure she will be able to complete physical therapy. Any way we can just order mri and have her follow up. Or can she get a referral for pain management for injections ?

## 2024-04-24 DIAGNOSIS — G89.29 CHRONIC MIDLINE THORACIC BACK PAIN: ICD-10-CM

## 2024-04-24 DIAGNOSIS — M54.6 CHRONIC MIDLINE THORACIC BACK PAIN: ICD-10-CM

## 2024-04-24 NOTE — TELEPHONE ENCOUNTER
Patient called requesting that we go ahead and order MRI. She said she spoke with insurance and they told her that PT  would not be required. I told patient to call back to schedule follow up with Lexy Marquez.

## 2024-04-29 ENCOUNTER — APPOINTMENT (OUTPATIENT)
Dept: PAIN MEDICINE | Facility: CLINIC | Age: 74
End: 2024-04-29
Payer: MEDICARE

## 2024-05-08 ENCOUNTER — HOSPITAL ENCOUNTER (OUTPATIENT)
Dept: RADIOLOGY | Facility: CLINIC | Age: 74
Discharge: HOME | End: 2024-05-08
Payer: MEDICARE

## 2024-05-08 DIAGNOSIS — M54.6 CHRONIC MIDLINE THORACIC BACK PAIN: ICD-10-CM

## 2024-05-08 DIAGNOSIS — G89.29 CHRONIC MIDLINE THORACIC BACK PAIN: ICD-10-CM

## 2024-05-08 PROCEDURE — 72146 MRI CHEST SPINE W/O DYE: CPT | Performed by: RADIOLOGY

## 2024-05-08 PROCEDURE — 72146 MRI CHEST SPINE W/O DYE: CPT

## 2024-05-14 ENCOUNTER — APPOINTMENT (OUTPATIENT)
Dept: PHYSICAL THERAPY | Facility: CLINIC | Age: 74
End: 2024-05-14
Payer: MEDICARE

## 2024-05-15 ENCOUNTER — APPOINTMENT (OUTPATIENT)
Dept: PHYSICAL THERAPY | Facility: CLINIC | Age: 74
End: 2024-05-15
Payer: MEDICARE

## 2024-05-16 ENCOUNTER — APPOINTMENT (OUTPATIENT)
Dept: ORTHOPEDIC SURGERY | Facility: CLINIC | Age: 74
End: 2024-05-16
Payer: MEDICARE

## 2024-06-11 ENCOUNTER — APPOINTMENT (OUTPATIENT)
Dept: PRIMARY CARE | Facility: CLINIC | Age: 74
End: 2024-06-11
Payer: MEDICARE

## 2024-06-17 ENCOUNTER — APPOINTMENT (OUTPATIENT)
Dept: PRIMARY CARE | Facility: CLINIC | Age: 74
End: 2024-06-17
Payer: MEDICARE

## 2024-06-24 ENCOUNTER — APPOINTMENT (OUTPATIENT)
Dept: PRIMARY CARE | Facility: CLINIC | Age: 74
End: 2024-06-24
Payer: MEDICARE

## 2024-06-24 DIAGNOSIS — E46 PROTEIN-CALORIE MALNUTRITION, UNSPECIFIED SEVERITY (MULTI): ICD-10-CM

## 2024-06-24 DIAGNOSIS — F41.1 GAD (GENERALIZED ANXIETY DISORDER): ICD-10-CM

## 2024-06-24 DIAGNOSIS — Z51.89 THERAPEUTIC: ICD-10-CM

## 2024-06-24 DIAGNOSIS — I10 CHRONIC HYPERTENSION: ICD-10-CM

## 2024-06-24 DIAGNOSIS — M81.0 OSTEOPOROSIS, UNSPECIFIED OSTEOPOROSIS TYPE, UNSPECIFIED PATHOLOGICAL FRACTURE PRESENCE: ICD-10-CM

## 2024-06-24 DIAGNOSIS — R73.09 ABNORMAL BLOOD SUGAR: ICD-10-CM

## 2024-06-24 DIAGNOSIS — E78.00 ELEVATED LDL CHOLESTEROL LEVEL: ICD-10-CM

## 2024-06-24 DIAGNOSIS — J43.2 CENTRILOBULAR EMPHYSEMA (MULTI): ICD-10-CM

## 2024-06-24 DIAGNOSIS — I10 BENIGN ESSENTIAL HYPERTENSION: ICD-10-CM

## 2024-06-24 DIAGNOSIS — Z51.81 ENCOUNTER FOR THERAPEUTIC DRUG LEVEL MONITORING: ICD-10-CM

## 2024-06-24 DIAGNOSIS — R73.9 HYPERGLYCEMIA: ICD-10-CM

## 2024-06-24 DIAGNOSIS — Z00.00 ROUTINE GENERAL MEDICAL EXAMINATION AT HEALTH CARE FACILITY: Primary | ICD-10-CM

## 2024-06-24 LAB
AMPHETAMINES UR QL SCN: NORMAL
BARBITURATES UR QL SCN: NORMAL
BENZODIAZ UR QL SCN: NORMAL
BZE UR QL SCN: NORMAL
CANNABINOIDS UR QL SCN: NORMAL
FENTANYL+NORFENTANYL UR QL SCN: NORMAL
METHADONE UR QL SCN: NORMAL
OPIATES UR QL SCN: NORMAL
OXYCODONE+OXYMORPHONE UR QL SCN: NORMAL
PCP UR QL SCN: NORMAL

## 2024-06-24 PROCEDURE — 3079F DIAST BP 80-89 MM HG: CPT | Performed by: FAMILY MEDICINE

## 2024-06-24 PROCEDURE — 1123F ACP DISCUSS/DSCN MKR DOCD: CPT | Performed by: FAMILY MEDICINE

## 2024-06-24 PROCEDURE — 80307 DRUG TEST PRSMV CHEM ANLYZR: CPT

## 2024-06-24 PROCEDURE — 1170F FXNL STATUS ASSESSED: CPT | Performed by: FAMILY MEDICINE

## 2024-06-24 PROCEDURE — 1158F ADVNC CARE PLAN TLK DOCD: CPT | Performed by: FAMILY MEDICINE

## 2024-06-24 PROCEDURE — 1159F MED LIST DOCD IN RCRD: CPT | Performed by: FAMILY MEDICINE

## 2024-06-24 PROCEDURE — G0439 PPPS, SUBSEQ VISIT: HCPCS | Performed by: FAMILY MEDICINE

## 2024-06-24 PROCEDURE — 3075F SYST BP GE 130 - 139MM HG: CPT | Performed by: FAMILY MEDICINE

## 2024-06-24 RX ORDER — ALPRAZOLAM 0.5 MG/1
0.5 TABLET ORAL 3 TIMES DAILY PRN
Qty: 21 TABLET | Refills: 0 | Status: SHIPPED | OUTPATIENT
Start: 2024-06-24 | End: 2024-07-01

## 2024-06-24 ASSESSMENT — ACTIVITIES OF DAILY LIVING (ADL)
MANAGING_FINANCES: INDEPENDENT
GROCERY_SHOPPING: INDEPENDENT
TAKING_MEDICATION: INDEPENDENT
DRESSING: INDEPENDENT
DOING_HOUSEWORK: INDEPENDENT
BATHING: INDEPENDENT

## 2024-06-24 ASSESSMENT — PATIENT HEALTH QUESTIONNAIRE - PHQ9
SUM OF ALL RESPONSES TO PHQ9 QUESTIONS 1 AND 2: 0
1. LITTLE INTEREST OR PLEASURE IN DOING THINGS: NOT AT ALL
2. FEELING DOWN, DEPRESSED OR HOPELESS: NOT AT ALL

## 2024-06-24 NOTE — PROGRESS NOTES
"Subjective   Reason for Visit: Humera Villegas is an 74 y.o. female here for a Medicare Wellness visit.               HPI  Patient is here for follow-up of hypertension hyperlipidemia has been taking meds as prescribed denies chest pain  Has shortness of breath due to COPD Denies  cmyalgias dizziness been eating healthy and trying to increase exercise  Would like a short script of Xanax/   COPD no exacerbation  Had mid back pain saw ortho   Patient Care Team:  Fabio Chou MD as PCP - General     Review of Systems   Constitutional:  Negative for chills and fever.   HENT: Negative.     Respiratory:  Positive for cough and shortness of breath.    Cardiovascular: Negative.    Gastrointestinal: Negative.  Negative for nausea and vomiting.   Endocrine: Negative.    Genitourinary: Negative.    Musculoskeletal:  Positive for arthralgias, back pain and gait problem.   Skin: Negative.  Negative for rash.   Allergic/Immunologic: Negative.    Hematological: Negative.    Psychiatric/Behavioral:  The patient is nervous/anxious.    All other systems reviewed and are negative.      Objective   Vitals:  /80   Pulse 100   Resp 20   Ht 1.575 m (5' 2\")   Wt 49.4 kg (108 lb 12.8 oz)   SpO2 95%   BMI 19.90 kg/m²       Physical Exam  Constitutional:       Appearance: Normal appearance.   Cardiovascular:      Rate and Rhythm: Normal rate and regular rhythm.   Pulmonary:      Effort: Pulmonary effort is normal.      Breath sounds: Normal breath sounds.   Abdominal:      General: Bowel sounds are normal.   Neurological:      General: No focal deficit present.      Mental Status: She is alert.   Psychiatric:         Mood and Affect: Mood normal.         Assessment/Plan   Problem List Items Addressed This Visit       Benign essential hypertension    Current Assessment & Plan     Patient's blood pressure is at goal of 130/85 or less. Condition is stable. Continue current medications and treatment plan.  I recommend that you exercise " for 30-45 minutes 5 days a week.  I also recommend a balanced diet with fruits and vegetables every day, lean meats, and little fried foods. The DASH diet (you can find this online) is a good example of this.           Relevant Orders    Lipid Panel    CBC    TSH with reflex to Free T4 if abnormal    Comprehensive Metabolic Panel    Vitamin D 25-Hydroxy,Total (for eval of Vitamin D levels)    Vitamin B12    RESOLVED: Chronic hypertension    Relevant Orders    Lipid Panel    CBC    TSH with reflex to Free T4 if abnormal    Comprehensive Metabolic Panel    Vitamin D 25-Hydroxy,Total (for eval of Vitamin D levels)    Vitamin B12    COPD (chronic obstructive pulmonary disease) (Multi)    Current Assessment & Plan     C/w Trelegy         Relevant Orders    Disability Placard    Lipid Panel    CBC    TSH with reflex to Free T4 if abnormal    Comprehensive Metabolic Panel    Vitamin D 25-Hydroxy,Total (for eval of Vitamin D levels)    Vitamin B12    Abnormal blood sugar    Current Assessment & Plan     Will check a1c         Relevant Orders    Lipid Panel    CBC    TSH with reflex to Free T4 if abnormal    Comprehensive Metabolic Panel    Vitamin D 25-Hydroxy,Total (for eval of Vitamin D levels)    Vitamin B12    Elevated LDL cholesterol level    Current Assessment & Plan     Check lipid panel continue medications continue healthy eating work out  150 minutes a week  C/w atorvastatin         Relevant Orders    Lipid Panel    CBC    TSH with reflex to Free T4 if abnormal    Comprehensive Metabolic Panel    Vitamin D 25-Hydroxy,Total (for eval of Vitamin D levels)    Vitamin B12    Hyperglycemia    Relevant Orders    Lipid Panel    CBC    TSH with reflex to Free T4 if abnormal    Comprehensive Metabolic Panel    Vitamin D 25-Hydroxy,Total (for eval of Vitamin D levels)    Vitamin B12    Osteoporosis    Current Assessment & Plan     C/w prolia calcium vit D         Relevant Orders    Lipid Panel    CBC    TSH with reflex to  "Free T4 if abnormal    Comprehensive Metabolic Panel    Vitamin D 25-Hydroxy,Total (for eval of Vitamin D levels)    Vitamin B12    Therapeutic    Relevant Orders    Drug Screen, Urine With Reflex to Confirmation (Completed)    Lipid Panel    CBC    TSH with reflex to Free T4 if abnormal    Comprehensive Metabolic Panel    Vitamin D 25-Hydroxy,Total (for eval of Vitamin D levels)    Vitamin B12    MOMO (generalized anxiety disorder)    Relevant Medications    ALPRAZolam (Xanax) 0.5 mg tablet    Other Relevant Orders    Lipid Panel    CBC    TSH with reflex to Free T4 if abnormal    Comprehensive Metabolic Panel    Vitamin D 25-Hydroxy,Total (for eval of Vitamin D levels)    Vitamin B12    Encounter for therapeutic drug level monitoring    Relevant Orders    Drug Screen, Urine With Reflex to Confirmation (Completed)    Lipid Panel    CBC    TSH with reflex to Free T4 if abnormal    Comprehensive Metabolic Panel    Vitamin D 25-Hydroxy,Total (for eval of Vitamin D levels)    Vitamin B12    Routine general medical examination at health care facility - Primary    Protein-calorie malnutrition, unspecified severity (Multi)    Current Assessment & Plan     Patient does not have PEM.            spent 15 minutes obtaining and discussing depression screening using PHQ-2 questions with results documented in chart.”  (If screen positive: \"The screen indicated potential depression and PHQ-9 was obtained with treatment and referral plan discussed       "

## 2024-06-30 VITALS
DIASTOLIC BLOOD PRESSURE: 80 MMHG | BODY MASS INDEX: 20.02 KG/M2 | SYSTOLIC BLOOD PRESSURE: 130 MMHG | RESPIRATION RATE: 20 BRPM | OXYGEN SATURATION: 95 % | WEIGHT: 108.8 LBS | HEIGHT: 62 IN | HEART RATE: 100 BPM

## 2024-06-30 PROBLEM — E46 PROTEIN-CALORIE MALNUTRITION, UNSPECIFIED SEVERITY (MULTI): Status: ACTIVE | Noted: 2024-06-30

## 2024-06-30 PROBLEM — Z51.81 ENCOUNTER FOR THERAPEUTIC DRUG LEVEL MONITORING: Status: ACTIVE | Noted: 2024-06-30

## 2024-06-30 PROBLEM — F41.1 GAD (GENERALIZED ANXIETY DISORDER): Status: ACTIVE | Noted: 2024-06-30

## 2024-06-30 PROBLEM — Z00.00 ROUTINE GENERAL MEDICAL EXAMINATION AT HEALTH CARE FACILITY: Status: ACTIVE | Noted: 2024-06-30

## 2024-06-30 PROBLEM — I10 CHRONIC HYPERTENSION: Status: RESOLVED | Noted: 2023-03-28 | Resolved: 2024-06-30

## 2024-06-30 PROBLEM — Z51.89: Status: ACTIVE | Noted: 2024-06-30

## 2024-06-30 ASSESSMENT — ENCOUNTER SYMPTOMS
FEVER: 0
NERVOUS/ANXIOUS: 1
CARDIOVASCULAR NEGATIVE: 1
BACK PAIN: 1
ALLERGIC/IMMUNOLOGIC NEGATIVE: 1
GASTROINTESTINAL NEGATIVE: 1
NAUSEA: 0
COUGH: 1
SHORTNESS OF BREATH: 1
VOMITING: 0
ARTHRALGIAS: 1
HEMATOLOGIC/LYMPHATIC NEGATIVE: 1
CHILLS: 0
ENDOCRINE NEGATIVE: 1

## 2024-07-01 DIAGNOSIS — K21.00 GASTROESOPHAGEAL REFLUX DISEASE WITH ESOPHAGITIS, UNSPECIFIED WHETHER HEMORRHAGE: ICD-10-CM

## 2024-07-01 DIAGNOSIS — I10 HYPERTENSION, UNSPECIFIED TYPE: ICD-10-CM

## 2024-07-01 DIAGNOSIS — J44.9 CHRONIC OBSTRUCTIVE PULMONARY DISEASE, UNSPECIFIED COPD TYPE (MULTI): ICD-10-CM

## 2024-07-01 RX ORDER — OMEPRAZOLE 20 MG/1
20 CAPSULE, DELAYED RELEASE ORAL DAILY
Qty: 90 CAPSULE | Refills: 3 | Status: SHIPPED | OUTPATIENT
Start: 2024-07-01

## 2024-07-01 RX ORDER — AMLODIPINE BESYLATE 2.5 MG/1
2.5 TABLET ORAL DAILY
Qty: 90 TABLET | Refills: 3 | Status: SHIPPED | OUTPATIENT
Start: 2024-07-01

## 2024-07-01 RX ORDER — FLUTICASONE FUROATE, UMECLIDINIUM BROMIDE AND VILANTEROL TRIFENATATE 100; 62.5; 25 UG/1; UG/1; UG/1
POWDER RESPIRATORY (INHALATION) DAILY
Qty: 60 EACH | Refills: 3 | Status: SHIPPED | OUTPATIENT
Start: 2024-07-01

## 2024-07-13 DIAGNOSIS — E78.5 DYSLIPIDEMIA: ICD-10-CM

## 2024-07-15 RX ORDER — ATORVASTATIN CALCIUM 20 MG/1
20 TABLET, FILM COATED ORAL DAILY
Qty: 90 TABLET | Refills: 3 | Status: SHIPPED | OUTPATIENT
Start: 2024-07-15

## 2024-08-31 DIAGNOSIS — J44.9 CHRONIC OBSTRUCTIVE PULMONARY DISEASE, UNSPECIFIED COPD TYPE (MULTI): ICD-10-CM

## 2024-09-03 ENCOUNTER — LAB (OUTPATIENT)
Dept: LAB | Facility: LAB | Age: 74
End: 2024-09-03
Payer: MEDICARE

## 2024-09-03 DIAGNOSIS — R73.09 ABNORMAL BLOOD SUGAR: ICD-10-CM

## 2024-09-03 DIAGNOSIS — Z51.81 ENCOUNTER FOR THERAPEUTIC DRUG LEVEL MONITORING: ICD-10-CM

## 2024-09-03 DIAGNOSIS — I10 BENIGN ESSENTIAL HYPERTENSION: ICD-10-CM

## 2024-09-03 DIAGNOSIS — M81.0 OSTEOPOROSIS, UNSPECIFIED OSTEOPOROSIS TYPE, UNSPECIFIED PATHOLOGICAL FRACTURE PRESENCE: ICD-10-CM

## 2024-09-03 DIAGNOSIS — R73.9 HYPERGLYCEMIA: ICD-10-CM

## 2024-09-03 DIAGNOSIS — J43.2 CENTRILOBULAR EMPHYSEMA (MULTI): ICD-10-CM

## 2024-09-03 DIAGNOSIS — Z51.89 THERAPEUTIC: ICD-10-CM

## 2024-09-03 DIAGNOSIS — E78.00 ELEVATED LDL CHOLESTEROL LEVEL: ICD-10-CM

## 2024-09-03 DIAGNOSIS — I10 CHRONIC HYPERTENSION: ICD-10-CM

## 2024-09-03 DIAGNOSIS — F41.1 GAD (GENERALIZED ANXIETY DISORDER): ICD-10-CM

## 2024-09-03 LAB
25(OH)D3 SERPL-MCNC: 58 NG/ML (ref 30–100)
ALBUMIN SERPL BCP-MCNC: 4.1 G/DL (ref 3.4–5)
ALP SERPL-CCNC: 73 U/L (ref 33–136)
ALT SERPL W P-5'-P-CCNC: 71 U/L (ref 7–45)
ANION GAP SERPL CALC-SCNC: 15 MMOL/L (ref 10–20)
AST SERPL W P-5'-P-CCNC: 44 U/L (ref 9–39)
BILIRUB SERPL-MCNC: 1.4 MG/DL (ref 0–1.2)
BUN SERPL-MCNC: 14 MG/DL (ref 6–23)
CALCIUM SERPL-MCNC: 8.9 MG/DL (ref 8.6–10.6)
CHLORIDE SERPL-SCNC: 102 MMOL/L (ref 98–107)
CHOLEST SERPL-MCNC: 139 MG/DL (ref 0–199)
CHOLESTEROL/HDL RATIO: 2.5
CO2 SERPL-SCNC: 22 MMOL/L (ref 21–32)
CREAT SERPL-MCNC: 0.68 MG/DL (ref 0.5–1.05)
EGFRCR SERPLBLD CKD-EPI 2021: 90 ML/MIN/1.73M*2
ERYTHROCYTE [DISTWIDTH] IN BLOOD BY AUTOMATED COUNT: 15.6 % (ref 11.5–14.5)
GLUCOSE SERPL-MCNC: 124 MG/DL (ref 74–99)
HCT VFR BLD AUTO: 53.9 % (ref 36–46)
HDLC SERPL-MCNC: 56.5 MG/DL
HGB BLD-MCNC: 17 G/DL (ref 12–16)
LDLC SERPL CALC-MCNC: 64 MG/DL
MCH RBC QN AUTO: 31.3 PG (ref 26–34)
MCHC RBC AUTO-ENTMCNC: 31.5 G/DL (ref 32–36)
MCV RBC AUTO: 99 FL (ref 80–100)
NON HDL CHOLESTEROL: 83 MG/DL (ref 0–149)
NRBC BLD-RTO: 0 /100 WBCS (ref 0–0)
PLATELET # BLD AUTO: 186 X10*3/UL (ref 150–450)
POTASSIUM SERPL-SCNC: 4.4 MMOL/L (ref 3.5–5.3)
PROT SERPL-MCNC: 7.6 G/DL (ref 6.4–8.2)
RBC # BLD AUTO: 5.44 X10*6/UL (ref 4–5.2)
SODIUM SERPL-SCNC: 135 MMOL/L (ref 136–145)
TRIGL SERPL-MCNC: 95 MG/DL (ref 0–149)
TSH SERPL-ACNC: 1.52 MIU/L (ref 0.44–3.98)
VIT B12 SERPL-MCNC: 278 PG/ML (ref 211–911)
VLDL: 19 MG/DL (ref 0–40)
WBC # BLD AUTO: 5.5 X10*3/UL (ref 4.4–11.3)

## 2024-09-03 PROCEDURE — 82306 VITAMIN D 25 HYDROXY: CPT

## 2024-09-03 PROCEDURE — 80053 COMPREHEN METABOLIC PANEL: CPT

## 2024-09-03 PROCEDURE — 84443 ASSAY THYROID STIM HORMONE: CPT

## 2024-09-03 PROCEDURE — 85027 COMPLETE CBC AUTOMATED: CPT

## 2024-09-03 PROCEDURE — 82607 VITAMIN B-12: CPT

## 2024-09-03 PROCEDURE — 80061 LIPID PANEL: CPT

## 2024-09-03 PROCEDURE — 36415 COLL VENOUS BLD VENIPUNCTURE: CPT

## 2024-09-03 RX ORDER — FLUTICASONE FUROATE, UMECLIDINIUM BROMIDE AND VILANTEROL TRIFENATATE 100; 62.5; 25 UG/1; UG/1; UG/1
1 POWDER RESPIRATORY (INHALATION) DAILY
Qty: 90 EACH | Refills: 1 | Status: SHIPPED | OUTPATIENT
Start: 2024-09-03

## 2024-09-04 DIAGNOSIS — F41.1 GAD (GENERALIZED ANXIETY DISORDER): ICD-10-CM

## 2024-09-04 DIAGNOSIS — R10.9 ABDOMINAL PAIN, UNSPECIFIED ABDOMINAL LOCATION: Primary | ICD-10-CM

## 2024-09-04 DIAGNOSIS — R79.89 LFT ELEVATION: ICD-10-CM

## 2024-09-04 DIAGNOSIS — R06.02 SOB (SHORTNESS OF BREATH): ICD-10-CM

## 2024-09-04 DIAGNOSIS — R74.8 ABNORMAL LEVELS OF OTHER SERUM ENZYMES: ICD-10-CM

## 2024-09-04 RX ORDER — LANOLIN ALCOHOL/MO/W.PET/CERES
1000 CREAM (GRAM) TOPICAL DAILY
Qty: 90 TABLET | Refills: 3 | Status: ON HOLD | OUTPATIENT
Start: 2024-09-04 | End: 2025-09-04

## 2024-09-09 ENCOUNTER — HOSPITAL ENCOUNTER (OUTPATIENT)
Dept: RADIOLOGY | Facility: CLINIC | Age: 74
Discharge: HOME | End: 2024-09-09
Payer: MEDICARE

## 2024-09-09 ENCOUNTER — LAB (OUTPATIENT)
Dept: LAB | Facility: LAB | Age: 74
End: 2024-09-09
Payer: MEDICARE

## 2024-09-09 DIAGNOSIS — R79.89 LFT ELEVATION: ICD-10-CM

## 2024-09-09 DIAGNOSIS — R74.8 ABNORMAL LEVELS OF OTHER SERUM ENZYMES: ICD-10-CM

## 2024-09-09 DIAGNOSIS — R10.9 ABDOMINAL PAIN, UNSPECIFIED ABDOMINAL LOCATION: ICD-10-CM

## 2024-09-09 DIAGNOSIS — R06.02 SOB (SHORTNESS OF BREATH): ICD-10-CM

## 2024-09-09 LAB
ALBUMIN SERPL BCP-MCNC: 4 G/DL (ref 3.4–5)
ALP SERPL-CCNC: 68 U/L (ref 33–136)
ALT SERPL W P-5'-P-CCNC: 71 U/L (ref 7–45)
AST SERPL W P-5'-P-CCNC: 37 U/L (ref 9–39)
BASOPHILS # BLD AUTO: 0.03 X10*3/UL (ref 0–0.1)
BASOPHILS NFR BLD AUTO: 0.6 %
BILIRUB DIRECT SERPL-MCNC: 0.3 MG/DL (ref 0–0.3)
BILIRUB SERPL-MCNC: 1.4 MG/DL (ref 0–1.2)
EOSINOPHIL # BLD AUTO: 0.04 X10*3/UL (ref 0–0.4)
EOSINOPHIL NFR BLD AUTO: 0.8 %
ERYTHROCYTE [DISTWIDTH] IN BLOOD BY AUTOMATED COUNT: 15.6 % (ref 11.5–14.5)
GGT SERPL-CCNC: 31 U/L (ref 5–55)
HCT VFR BLD AUTO: 57.5 % (ref 36–46)
HGB BLD-MCNC: 18.4 G/DL (ref 12–16)
IMM GRANULOCYTES # BLD AUTO: 0.03 X10*3/UL (ref 0–0.5)
IMM GRANULOCYTES NFR BLD AUTO: 0.6 % (ref 0–0.9)
LYMPHOCYTES # BLD AUTO: 0.75 X10*3/UL (ref 0.8–3)
LYMPHOCYTES NFR BLD AUTO: 14.8 %
MCH RBC QN AUTO: 31.4 PG (ref 26–34)
MCHC RBC AUTO-ENTMCNC: 32 G/DL (ref 32–36)
MCV RBC AUTO: 98 FL (ref 80–100)
MONOCYTES # BLD AUTO: 0.46 X10*3/UL (ref 0.05–0.8)
MONOCYTES NFR BLD AUTO: 9.1 %
NEUTROPHILS # BLD AUTO: 3.75 X10*3/UL (ref 1.6–5.5)
NEUTROPHILS NFR BLD AUTO: 74.1 %
NRBC BLD-RTO: 0 /100 WBCS (ref 0–0)
PLATELET # BLD AUTO: 219 X10*3/UL (ref 150–450)
PROT SERPL-MCNC: 7.6 G/DL (ref 6.4–8.2)
RBC # BLD AUTO: 5.86 X10*6/UL (ref 4–5.2)
WBC # BLD AUTO: 5.1 X10*3/UL (ref 4.4–11.3)

## 2024-09-09 PROCEDURE — 71046 X-RAY EXAM CHEST 2 VIEWS: CPT

## 2024-09-09 PROCEDURE — 36415 COLL VENOUS BLD VENIPUNCTURE: CPT

## 2024-09-09 PROCEDURE — 76705 ECHO EXAM OF ABDOMEN: CPT | Performed by: RADIOLOGY

## 2024-09-09 PROCEDURE — 76705 ECHO EXAM OF ABDOMEN: CPT

## 2024-09-09 PROCEDURE — 85025 COMPLETE CBC W/AUTO DIFF WBC: CPT

## 2024-09-09 PROCEDURE — 82977 ASSAY OF GGT: CPT

## 2024-09-09 PROCEDURE — 71046 X-RAY EXAM CHEST 2 VIEWS: CPT | Performed by: RADIOLOGY

## 2024-09-09 PROCEDURE — 80076 HEPATIC FUNCTION PANEL: CPT

## 2024-09-12 ENCOUNTER — APPOINTMENT (OUTPATIENT)
Dept: RADIOLOGY | Facility: HOSPITAL | Age: 74
DRG: 189 | End: 2024-09-12
Payer: MEDICARE

## 2024-09-12 ENCOUNTER — HOSPITAL ENCOUNTER (INPATIENT)
Facility: HOSPITAL | Age: 74
DRG: 189 | End: 2024-09-12
Attending: EMERGENCY MEDICINE | Admitting: INTERNAL MEDICINE
Payer: MEDICARE

## 2024-09-12 ENCOUNTER — OFFICE VISIT (OUTPATIENT)
Dept: PRIMARY CARE | Facility: CLINIC | Age: 74
End: 2024-09-12
Payer: MEDICARE

## 2024-09-12 ENCOUNTER — APPOINTMENT (OUTPATIENT)
Dept: CARDIOLOGY | Facility: HOSPITAL | Age: 74
DRG: 189 | End: 2024-09-12
Payer: MEDICARE

## 2024-09-12 VITALS
WEIGHT: 107 LBS | HEART RATE: 108 BPM | DIASTOLIC BLOOD PRESSURE: 73 MMHG | BODY MASS INDEX: 19.57 KG/M2 | SYSTOLIC BLOOD PRESSURE: 129 MMHG | RESPIRATION RATE: 19 BRPM

## 2024-09-12 DIAGNOSIS — J96.01 ACUTE RESPIRATORY FAILURE WITH HYPOXIA AND HYPERCAPNIA (MULTI): ICD-10-CM

## 2024-09-12 DIAGNOSIS — J18.9 PNEUMONIA OF BOTH LOWER LOBES DUE TO INFECTIOUS ORGANISM: ICD-10-CM

## 2024-09-12 DIAGNOSIS — I10 ESSENTIAL (PRIMARY) HYPERTENSION: ICD-10-CM

## 2024-09-12 DIAGNOSIS — I10 BENIGN ESSENTIAL HYPERTENSION: Primary | ICD-10-CM

## 2024-09-12 DIAGNOSIS — I35.0 MODERATE AORTIC STENOSIS: ICD-10-CM

## 2024-09-12 DIAGNOSIS — K21.00 GASTROESOPHAGEAL REFLUX DISEASE WITH ESOPHAGITIS, UNSPECIFIED WHETHER HEMORRHAGE: ICD-10-CM

## 2024-09-12 DIAGNOSIS — J96.02 ACUTE RESPIRATORY FAILURE WITH HYPOXIA AND HYPERCAPNIA (MULTI): ICD-10-CM

## 2024-09-12 DIAGNOSIS — I27.20 PULMONARY HYPERTENSION (MULTI): ICD-10-CM

## 2024-09-12 DIAGNOSIS — J44.1 COPD EXACERBATION (MULTI): Primary | ICD-10-CM

## 2024-09-12 LAB
ALBUMIN SERPL BCP-MCNC: 4.1 G/DL (ref 3.4–5)
ALP SERPL-CCNC: 65 U/L (ref 33–136)
ALT SERPL W P-5'-P-CCNC: 54 U/L (ref 7–45)
ANION GAP BLDV CALCULATED.4IONS-SCNC: 10 MMOL/L (ref 10–25)
ANION GAP SERPL CALC-SCNC: 15 MMOL/L (ref 10–20)
APPARATUS: ABNORMAL
AST SERPL W P-5'-P-CCNC: 33 U/L (ref 9–39)
BASE EXCESS BLDV CALC-SCNC: 1.6 MMOL/L (ref -2–3)
BASOPHILS # BLD AUTO: 0.02 X10*3/UL (ref 0–0.1)
BASOPHILS NFR BLD AUTO: 0.3 %
BILIRUB SERPL-MCNC: 1.4 MG/DL (ref 0–1.2)
BNP SERPL-MCNC: 729 PG/ML (ref 0–99)
BODY TEMPERATURE: 37 DEGREES CELSIUS
BUN SERPL-MCNC: 15 MG/DL (ref 6–23)
CA-I BLDV-SCNC: 1.09 MMOL/L (ref 1.1–1.33)
CALCIUM SERPL-MCNC: 9.7 MG/DL (ref 8.6–10.3)
CARDIAC TROPONIN I PNL SERPL HS: 30 NG/L (ref 0–13)
CARDIAC TROPONIN I PNL SERPL HS: 33 NG/L (ref 0–13)
CHLORIDE BLDV-SCNC: 98 MMOL/L (ref 98–107)
CHLORIDE SERPL-SCNC: 97 MMOL/L (ref 98–107)
CO2 SERPL-SCNC: 23 MMOL/L (ref 21–32)
CREAT SERPL-MCNC: 0.8 MG/DL (ref 0.5–1.05)
EGFRCR SERPLBLD CKD-EPI 2021: 76 ML/MIN/1.73M*2
EOSINOPHIL # BLD AUTO: 0.02 X10*3/UL (ref 0–0.4)
EOSINOPHIL NFR BLD AUTO: 0.3 %
ERYTHROCYTE [DISTWIDTH] IN BLOOD BY AUTOMATED COUNT: 16.4 % (ref 11.5–14.5)
FLUAV RNA RESP QL NAA+PROBE: NOT DETECTED
FLUBV RNA RESP QL NAA+PROBE: NOT DETECTED
GLUCOSE BLDV-MCNC: 145 MG/DL (ref 74–99)
GLUCOSE SERPL-MCNC: 131 MG/DL (ref 74–99)
HCO3 BLDV-SCNC: 25.9 MMOL/L (ref 22–26)
HCT VFR BLD AUTO: 56.2 % (ref 36–46)
HCT VFR BLD EST: 52 % (ref 36–46)
HGB BLD-MCNC: 18.1 G/DL (ref 12–16)
HGB BLDV-MCNC: 17.4 G/DL (ref 12–16)
IMM GRANULOCYTES # BLD AUTO: 0.02 X10*3/UL (ref 0–0.5)
IMM GRANULOCYTES NFR BLD AUTO: 0.3 % (ref 0–0.9)
INHALED O2 CONCENTRATION: 36 %
LACTATE BLDV-SCNC: 1.8 MMOL/L (ref 0.4–2)
LYMPHOCYTES # BLD AUTO: 0.62 X10*3/UL (ref 0.8–3)
LYMPHOCYTES NFR BLD AUTO: 10.7 %
MCH RBC QN AUTO: 31.1 PG (ref 26–34)
MCHC RBC AUTO-ENTMCNC: 32.2 G/DL (ref 32–36)
MCV RBC AUTO: 97 FL (ref 80–100)
MONOCYTES # BLD AUTO: 0.46 X10*3/UL (ref 0.05–0.8)
MONOCYTES NFR BLD AUTO: 7.9 %
NEUTROPHILS # BLD AUTO: 4.67 X10*3/UL (ref 1.6–5.5)
NEUTROPHILS NFR BLD AUTO: 80.5 %
NRBC BLD-RTO: 0 /100 WBCS (ref 0–0)
OXYHGB MFR BLDV: 49.5 % (ref 45–75)
PCO2 BLDV: 39 MM HG (ref 41–51)
PH BLDV: 7.43 PH (ref 7.33–7.43)
PLATELET # BLD AUTO: 227 X10*3/UL (ref 150–450)
PO2 BLDV: 34 MM HG (ref 35–45)
POTASSIUM BLDV-SCNC: 5.6 MMOL/L (ref 3.5–5.3)
POTASSIUM SERPL-SCNC: 4 MMOL/L (ref 3.5–5.3)
PROT SERPL-MCNC: 8.4 G/DL (ref 6.4–8.2)
RBC # BLD AUTO: 5.82 X10*6/UL (ref 4–5.2)
RSV RNA RESP QL NAA+PROBE: NOT DETECTED
SAO2 % BLDV: 51 % (ref 45–75)
SARS-COV-2 RNA RESP QL NAA+PROBE: NOT DETECTED
SODIUM BLDV-SCNC: 128 MMOL/L (ref 136–145)
SODIUM SERPL-SCNC: 131 MMOL/L (ref 136–145)
WBC # BLD AUTO: 5.8 X10*3/UL (ref 4.4–11.3)

## 2024-09-12 PROCEDURE — 71275 CT ANGIOGRAPHY CHEST: CPT | Mod: FOREIGN READ | Performed by: RADIOLOGY

## 2024-09-12 PROCEDURE — 83880 ASSAY OF NATRIURETIC PEPTIDE: CPT

## 2024-09-12 PROCEDURE — 2500000005 HC RX 250 GENERAL PHARMACY W/O HCPCS: Performed by: INTERNAL MEDICINE

## 2024-09-12 PROCEDURE — 84484 ASSAY OF TROPONIN QUANT: CPT

## 2024-09-12 PROCEDURE — 2500000004 HC RX 250 GENERAL PHARMACY W/ HCPCS (ALT 636 FOR OP/ED)

## 2024-09-12 PROCEDURE — 71275 CT ANGIOGRAPHY CHEST: CPT

## 2024-09-12 PROCEDURE — 2500000005 HC RX 250 GENERAL PHARMACY W/O HCPCS: Performed by: EMERGENCY MEDICINE

## 2024-09-12 PROCEDURE — 93005 ELECTROCARDIOGRAM TRACING: CPT

## 2024-09-12 PROCEDURE — 96368 THER/DIAG CONCURRENT INF: CPT

## 2024-09-12 PROCEDURE — 96375 TX/PRO/DX INJ NEW DRUG ADDON: CPT

## 2024-09-12 PROCEDURE — 99215 OFFICE O/P EST HI 40 MIN: CPT | Performed by: FAMILY MEDICINE

## 2024-09-12 PROCEDURE — 84132 ASSAY OF SERUM POTASSIUM: CPT

## 2024-09-12 PROCEDURE — 99285 EMERGENCY DEPT VISIT HI MDM: CPT

## 2024-09-12 PROCEDURE — 2500000002 HC RX 250 W HCPCS SELF ADMINISTERED DRUGS (ALT 637 FOR MEDICARE OP, ALT 636 FOR OP/ED): Performed by: PHARMACIST

## 2024-09-12 PROCEDURE — 94640 AIRWAY INHALATION TREATMENT: CPT

## 2024-09-12 PROCEDURE — 71046 X-RAY EXAM CHEST 2 VIEWS: CPT | Mod: FOREIGN READ | Performed by: RADIOLOGY

## 2024-09-12 PROCEDURE — 1123F ACP DISCUSS/DSCN MKR DOCD: CPT | Performed by: FAMILY MEDICINE

## 2024-09-12 PROCEDURE — 2500000002 HC RX 250 W HCPCS SELF ADMINISTERED DRUGS (ALT 637 FOR MEDICARE OP, ALT 636 FOR OP/ED)

## 2024-09-12 PROCEDURE — 1159F MED LIST DOCD IN RCRD: CPT | Performed by: FAMILY MEDICINE

## 2024-09-12 PROCEDURE — 71046 X-RAY EXAM CHEST 2 VIEWS: CPT

## 2024-09-12 PROCEDURE — 36415 COLL VENOUS BLD VENIPUNCTURE: CPT

## 2024-09-12 PROCEDURE — 2550000001 HC RX 255 CONTRASTS: Performed by: EMERGENCY MEDICINE

## 2024-09-12 PROCEDURE — 96365 THER/PROPH/DIAG IV INF INIT: CPT

## 2024-09-12 PROCEDURE — 85025 COMPLETE CBC W/AUTO DIFF WBC: CPT

## 2024-09-12 PROCEDURE — 3078F DIAST BP <80 MM HG: CPT | Performed by: FAMILY MEDICINE

## 2024-09-12 PROCEDURE — 3074F SYST BP LT 130 MM HG: CPT | Performed by: FAMILY MEDICINE

## 2024-09-12 PROCEDURE — 9420000001 HC RT PATIENT EDUCATION 5 MIN

## 2024-09-12 PROCEDURE — 2500000002 HC RX 250 W HCPCS SELF ADMINISTERED DRUGS (ALT 637 FOR MEDICARE OP, ALT 636 FOR OP/ED): Performed by: INTERNAL MEDICINE

## 2024-09-12 PROCEDURE — 87502 INFLUENZA DNA AMP PROBE: CPT | Performed by: EMERGENCY MEDICINE

## 2024-09-12 PROCEDURE — 2500000001 HC RX 250 WO HCPCS SELF ADMINISTERED DRUGS (ALT 637 FOR MEDICARE OP): Performed by: INTERNAL MEDICINE

## 2024-09-12 PROCEDURE — 1200000002 HC GENERAL ROOM WITH TELEMETRY DAILY

## 2024-09-12 PROCEDURE — 2500000004 HC RX 250 GENERAL PHARMACY W/ HCPCS (ALT 636 FOR OP/ED): Performed by: INTERNAL MEDICINE

## 2024-09-12 RX ORDER — FORMOTEROL FUMARATE DIHYDRATE 20 UG/2ML
20 SOLUTION RESPIRATORY (INHALATION)
Status: DISCONTINUED | OUTPATIENT
Start: 2024-09-12 | End: 2024-09-17 | Stop reason: HOSPADM

## 2024-09-12 RX ORDER — ALPRAZOLAM 0.5 MG/1
0.5 TABLET ORAL 3 TIMES DAILY PRN
Status: DISCONTINUED | OUTPATIENT
Start: 2024-09-12 | End: 2024-09-17 | Stop reason: HOSPADM

## 2024-09-12 RX ORDER — AMLODIPINE BESYLATE 2.5 MG/1
2.5 TABLET ORAL DAILY
Status: DISCONTINUED | OUTPATIENT
Start: 2024-09-13 | End: 2024-09-17 | Stop reason: HOSPADM

## 2024-09-12 RX ORDER — BUDESONIDE 0.5 MG/2ML
0.5 INHALANT ORAL
Status: DISCONTINUED | OUTPATIENT
Start: 2024-09-12 | End: 2024-09-17 | Stop reason: HOSPADM

## 2024-09-12 RX ORDER — IPRATROPIUM BROMIDE AND ALBUTEROL SULFATE 2.5; .5 MG/3ML; MG/3ML
3 SOLUTION RESPIRATORY (INHALATION)
Status: DISCONTINUED | OUTPATIENT
Start: 2024-09-12 | End: 2024-09-12

## 2024-09-12 RX ORDER — ATORVASTATIN CALCIUM 20 MG/1
20 TABLET, FILM COATED ORAL DAILY
Status: DISCONTINUED | OUTPATIENT
Start: 2024-09-13 | End: 2024-09-17 | Stop reason: HOSPADM

## 2024-09-12 RX ORDER — GABAPENTIN 100 MG/1
100 CAPSULE ORAL 3 TIMES DAILY
Status: DISCONTINUED | OUTPATIENT
Start: 2024-09-12 | End: 2024-09-17 | Stop reason: HOSPADM

## 2024-09-12 RX ORDER — CHOLECALCIFEROL (VITAMIN D3) 25 MCG
1000 TABLET ORAL DAILY
COMMUNITY

## 2024-09-12 RX ORDER — FLUTICASONE FUROATE AND VILANTEROL 200; 25 UG/1; UG/1
1 POWDER RESPIRATORY (INHALATION)
Status: DISCONTINUED | OUTPATIENT
Start: 2024-09-13 | End: 2024-09-12

## 2024-09-12 RX ORDER — IPRATROPIUM BROMIDE AND ALBUTEROL SULFATE 2.5; .5 MG/3ML; MG/3ML
3 SOLUTION RESPIRATORY (INHALATION) EVERY 2 HOUR PRN
Status: DISCONTINUED | OUTPATIENT
Start: 2024-09-12 | End: 2024-09-17 | Stop reason: HOSPADM

## 2024-09-12 RX ORDER — IPRATROPIUM BROMIDE AND ALBUTEROL SULFATE 2.5; .5 MG/3ML; MG/3ML
3 SOLUTION RESPIRATORY (INHALATION)
Status: DISCONTINUED | OUTPATIENT
Start: 2024-09-12 | End: 2024-09-17 | Stop reason: HOSPADM

## 2024-09-12 RX ORDER — IPRATROPIUM BROMIDE AND ALBUTEROL SULFATE 2.5; .5 MG/3ML; MG/3ML
3 SOLUTION RESPIRATORY (INHALATION) ONCE
Status: COMPLETED | OUTPATIENT
Start: 2024-09-12 | End: 2024-09-12

## 2024-09-12 RX ORDER — MAGNESIUM SULFATE HEPTAHYDRATE 40 MG/ML
2 INJECTION, SOLUTION INTRAVENOUS ONCE
Status: COMPLETED | OUTPATIENT
Start: 2024-09-12 | End: 2024-09-12

## 2024-09-12 RX ORDER — ERTAPENEM 1 G/1
1 INJECTION, POWDER, LYOPHILIZED, FOR SOLUTION INTRAMUSCULAR; INTRAVENOUS EVERY 24 HOURS
Status: DISCONTINUED | OUTPATIENT
Start: 2024-09-12 | End: 2024-09-12

## 2024-09-12 RX ORDER — CEFTRIAXONE 1 G/50ML
1 INJECTION, SOLUTION INTRAVENOUS ONCE
Status: COMPLETED | OUTPATIENT
Start: 2024-09-12 | End: 2024-09-12

## 2024-09-12 RX ORDER — ENOXAPARIN SODIUM 100 MG/ML
40 INJECTION SUBCUTANEOUS
Status: DISCONTINUED | OUTPATIENT
Start: 2024-09-12 | End: 2024-09-17 | Stop reason: HOSPADM

## 2024-09-12 RX ORDER — LEVOFLOXACIN 5 MG/ML
750 INJECTION, SOLUTION INTRAVENOUS
Status: DISCONTINUED | OUTPATIENT
Start: 2024-09-13 | End: 2024-09-13

## 2024-09-12 SDOH — SOCIAL STABILITY: SOCIAL INSECURITY: DO YOU FEEL UNSAFE GOING BACK TO THE PLACE WHERE YOU ARE LIVING?: NO

## 2024-09-12 SDOH — SOCIAL STABILITY: SOCIAL INSECURITY: DOES ANYONE TRY TO KEEP YOU FROM HAVING/CONTACTING OTHER FRIENDS OR DOING THINGS OUTSIDE YOUR HOME?: NO

## 2024-09-12 SDOH — SOCIAL STABILITY: SOCIAL INSECURITY: HAS ANYONE EVER THREATENED TO HURT YOUR FAMILY OR YOUR PETS?: NO

## 2024-09-12 SDOH — SOCIAL STABILITY: SOCIAL INSECURITY: ARE YOU OR HAVE YOU BEEN THREATENED OR ABUSED PHYSICALLY, EMOTIONALLY, OR SEXUALLY BY ANYONE?: NO

## 2024-09-12 SDOH — SOCIAL STABILITY: SOCIAL INSECURITY: HAVE YOU HAD ANY THOUGHTS OF HARMING ANYONE ELSE?: NO

## 2024-09-12 SDOH — SOCIAL STABILITY: SOCIAL INSECURITY: DO YOU FEEL ANYONE HAS EXPLOITED OR TAKEN ADVANTAGE OF YOU FINANCIALLY OR OF YOUR PERSONAL PROPERTY?: NO

## 2024-09-12 SDOH — SOCIAL STABILITY: SOCIAL INSECURITY: WERE YOU ABLE TO COMPLETE ALL THE BEHAVIORAL HEALTH SCREENINGS?: YES

## 2024-09-12 SDOH — SOCIAL STABILITY: SOCIAL INSECURITY: HAVE YOU HAD THOUGHTS OF HARMING ANYONE ELSE?: NO

## 2024-09-12 SDOH — SOCIAL STABILITY: SOCIAL INSECURITY: ARE THERE ANY APPARENT SIGNS OF INJURIES/BEHAVIORS THAT COULD BE RELATED TO ABUSE/NEGLECT?: NO

## 2024-09-12 SDOH — SOCIAL STABILITY: SOCIAL INSECURITY: ABUSE: ADULT

## 2024-09-12 ASSESSMENT — COGNITIVE AND FUNCTIONAL STATUS - GENERAL
DAILY ACTIVITIY SCORE: 24
PATIENT BASELINE BEDBOUND: NO
MOBILITY SCORE: 20
MOVING TO AND FROM BED TO CHAIR: A LITTLE
WALKING IN HOSPITAL ROOM: A LITTLE
STANDING UP FROM CHAIR USING ARMS: A LITTLE
CLIMB 3 TO 5 STEPS WITH RAILING: A LITTLE

## 2024-09-12 ASSESSMENT — PATIENT HEALTH QUESTIONNAIRE - PHQ9
SUM OF ALL RESPONSES TO PHQ9 QUESTIONS 1 & 2: 0
2. FEELING DOWN, DEPRESSED OR HOPELESS: NOT AT ALL
1. LITTLE INTEREST OR PLEASURE IN DOING THINGS: NOT AT ALL

## 2024-09-12 ASSESSMENT — LIFESTYLE VARIABLES
SUBSTANCE_ABUSE_PAST_12_MONTHS: NO
HOW MANY STANDARD DRINKS CONTAINING ALCOHOL DO YOU HAVE ON A TYPICAL DAY: PATIENT DOES NOT DRINK
AUDIT-C TOTAL SCORE: 0
PRESCIPTION_ABUSE_PAST_12_MONTHS: NO
HOW OFTEN DO YOU HAVE 6 OR MORE DRINKS ON ONE OCCASION: NEVER
SKIP TO QUESTIONS 9-10: 1
HOW OFTEN DO YOU HAVE A DRINK CONTAINING ALCOHOL: NEVER
AUDIT-C TOTAL SCORE: 0

## 2024-09-12 ASSESSMENT — ACTIVITIES OF DAILY LIVING (ADL)
GROOMING: INDEPENDENT
JUDGMENT_ADEQUATE_SAFELY_COMPLETE_DAILY_ACTIVITIES: YES
ADEQUATE_TO_COMPLETE_ADL: YES
HEARING - LEFT EAR: FUNCTIONAL
TOILETING: INDEPENDENT
BATHING: INDEPENDENT
HEARING - RIGHT EAR: FUNCTIONAL
LACK_OF_TRANSPORTATION: NO
PATIENT'S MEMORY ADEQUATE TO SAFELY COMPLETE DAILY ACTIVITIES?: YES
DRESSING YOURSELF: INDEPENDENT
FEEDING YOURSELF: INDEPENDENT
WALKS IN HOME: INDEPENDENT

## 2024-09-12 ASSESSMENT — PAIN - FUNCTIONAL ASSESSMENT
PAIN_FUNCTIONAL_ASSESSMENT: 0-10
PAIN_FUNCTIONAL_ASSESSMENT: 0-10

## 2024-09-12 ASSESSMENT — PAIN SCALES - GENERAL
PAINLEVEL_OUTOF10: 0 - NO PAIN

## 2024-09-12 ASSESSMENT — COLUMBIA-SUICIDE SEVERITY RATING SCALE - C-SSRS
2. HAVE YOU ACTUALLY HAD ANY THOUGHTS OF KILLING YOURSELF?: NO
1. IN THE PAST MONTH, HAVE YOU WISHED YOU WERE DEAD OR WISHED YOU COULD GO TO SLEEP AND NOT WAKE UP?: NO
6. HAVE YOU EVER DONE ANYTHING, STARTED TO DO ANYTHING, OR PREPARED TO DO ANYTHING TO END YOUR LIFE?: NO

## 2024-09-12 NOTE — ED PROVIDER NOTES
Emergency Department Provider Note        History of Present Illness     History provided by: Patient  Limitations to History: None  External Records Reviewed with Brief Summary: None    HPI:  Humera Villegas is a 74 y.o. female Past medical history significant for hypertension, COPD, hyperlipidemia, generalized anxiety disorder and osteoporosis presenting to the emergency department with concern for hypoxia.  Patient was being evaluated in her outpatient primary care office and was sent to the emergency department with concern for low pulse ox.  She also was demonstrating increased shortness of breath with exertion.  Patient states she has had intermittent episodes of hypoxia and dyspnea on exertion over the past few months.  States she is not on blood thinners, no history of blood clots or DVT.  Speaking in full sentences however her pulse ox was showing 62% on room air.  Patient denies fever, chills, cough, congestion, rhinorrhea, nausea, vomiting or diarrhea.  No abdominal pain.  No acute complaints.  Patient placed on nonrebreather but is otherwise well-appearing and in no acute distress.    Physical Exam   Triage vitals:  T 37.1 °C (98.7 °F)  HR (!) 102  BP (!) 147/134  RR (!) 22  O2 (!) 78 % Supplemental oxygen    General: Awake, alert, in no acute distress  Eyes: Gaze conjugate.  No scleral icterus or injection  HENT: Normo-cephalic, atraumatic. No stridor  CV: Tachycardic rate, regular rhythm. Radial pulses 2+ bilaterally  Resp: Tachypneic with increased work of breathing, poor air movement in the upper lobes bilaterally.  Lower.  Presenting to the emergency department with concern for hypoxia.  Patient has known COPD decreased lung sounds in bilateral bases  GI: Soft, non-distended, non-tender. No rebound or guarding.  MSK/Extremities: No gross bony deformities. Moving all extremities  Skin: Warm. Appropriate color  Neuro: Alert. Oriented. Face symmetric. Speech is fluent.  Gross strength and sensation  intact in b/l UE and LEs  Psych: Appropriate mood and affect    Medical Decision Making & ED Course   Medical Decision Makin y.o. female Presenting to the emergency department with concern for hypoxia.  Patient had a pulse ox reading of 68% per family [facility] clinic.  Patient on arrival is otherwise hemodynamically stable, does have some tachypnea but is speaking in full sentences and does not appear to be in acute respiratory distress.  Patient initially placed on a nonrebreather and then titrated down to 6 L nasal cannula.  Patient does not have an oxygen requirement at baseline.  Workup initiated to rule out infectious versus obstructive etiology including PE.  Also considered ACS versus dissection versus pericarditis versus viral upper respiratory infection however this is not consistent with patient's presentation today.  Labs are significant for troponin of 33 and downtrending on delta troponin with a BNP of 729.  Electrolytes are stable on CMP with some mild hyponatremia.  Patient noted to have an hemoglobin of 18.1.  Negative for COVID, flu and RSV.  Chest x-ray shows possible opacities in the bilateral lungs concerning for pneumonia.  CT PE study ordered a to rule out pulmonary embolism.  CT PE study is negative for acute pulmonary millages however patient has severe emphysematous disease with very poor air exchange occurring in the upper lobes of the lung.  Patient also noted to have infiltrates in the lower lobes bilaterally concerning for pneumonia.  On reevaluation, patient remains otherwise hemodynamically stable aside from a new oxygen requirement of 6 L.  Will start patient on antibiotics including ceftriaxone and doxycycline for atypical coverage and plan to admit patient for COPD exacerbation and hypoxia for further management and observation.  Patient is agreeable with this plan and patient admitted in stable condition.  ----    ED Course:  ED Course as of 24 1810   Thu Sep 12, 2024    1808 EKG performed at 1405, interpreted by me.  Sinus tachycardia with rate of 103.  Normal axis, normal intervals.  No ST elevation or depression, no T wave abnormalities. [PW]      ED Course User Index  [PW] Rachel Luis DO         Diagnoses as of 09/12/24 1810   COPD exacerbation (Multi)   Pneumonia of both lower lobes due to infectious organism     Disposition   As a result of their workup, the patient will require admission to the hospital.  The patient was informed of her diagnosis.  The patient was given the opportunity to ask questions and I answered them. The patient agreed to be admitted to the hospital.    Procedures   Procedures    Patient seen and discussed with ED attending physician.    Rachel Luis DO  Emergency Medicine            Rachel Luis DO  Resident  09/12/24 1810

## 2024-09-13 LAB
ANION GAP SERPL CALC-SCNC: 14 MMOL/L (ref 10–20)
ANION GAP SERPL CALC-SCNC: 15 MMOL/L (ref 10–20)
BASOPHILS # BLD AUTO: 0 X10*3/UL (ref 0–0.1)
BASOPHILS # BLD AUTO: 0 X10*3/UL (ref 0–0.1)
BASOPHILS NFR BLD AUTO: 0 %
BASOPHILS NFR BLD AUTO: 0 %
BUN SERPL-MCNC: 15 MG/DL (ref 6–23)
BUN SERPL-MCNC: 16 MG/DL (ref 6–23)
CALCIUM SERPL-MCNC: 8.1 MG/DL (ref 8.6–10.3)
CALCIUM SERPL-MCNC: 8.4 MG/DL (ref 8.6–10.3)
CHLORIDE SERPL-SCNC: 100 MMOL/L (ref 98–107)
CHLORIDE SERPL-SCNC: 99 MMOL/L (ref 98–107)
CO2 SERPL-SCNC: 18 MMOL/L (ref 21–32)
CO2 SERPL-SCNC: 23 MMOL/L (ref 21–32)
CREAT SERPL-MCNC: 0.6 MG/DL (ref 0.5–1.05)
CREAT SERPL-MCNC: 0.68 MG/DL (ref 0.5–1.05)
EGFRCR SERPLBLD CKD-EPI 2021: 90 ML/MIN/1.73M*2
EGFRCR SERPLBLD CKD-EPI 2021: >90 ML/MIN/1.73M*2
EOSINOPHIL # BLD AUTO: 0 X10*3/UL (ref 0–0.4)
EOSINOPHIL # BLD AUTO: 0 X10*3/UL (ref 0–0.4)
EOSINOPHIL NFR BLD AUTO: 0 %
EOSINOPHIL NFR BLD AUTO: 0 %
ERYTHROCYTE [DISTWIDTH] IN BLOOD BY AUTOMATED COUNT: 15.2 % (ref 11.5–14.5)
ERYTHROCYTE [DISTWIDTH] IN BLOOD BY AUTOMATED COUNT: 15.4 % (ref 11.5–14.5)
GLUCOSE SERPL-MCNC: 149 MG/DL (ref 74–99)
GLUCOSE SERPL-MCNC: 168 MG/DL (ref 74–99)
HCT VFR BLD AUTO: 51 % (ref 36–46)
HCT VFR BLD AUTO: 51.4 % (ref 36–46)
HGB BLD-MCNC: 16.1 G/DL (ref 12–16)
HGB BLD-MCNC: 16.2 G/DL (ref 12–16)
IMM GRANULOCYTES # BLD AUTO: 0.01 X10*3/UL (ref 0–0.5)
IMM GRANULOCYTES # BLD AUTO: 0.03 X10*3/UL (ref 0–0.5)
IMM GRANULOCYTES NFR BLD AUTO: 0.2 % (ref 0–0.9)
IMM GRANULOCYTES NFR BLD AUTO: 0.4 % (ref 0–0.9)
LYMPHOCYTES # BLD AUTO: 0.23 X10*3/UL (ref 0.8–3)
LYMPHOCYTES # BLD AUTO: 0.26 X10*3/UL (ref 0.8–3)
LYMPHOCYTES NFR BLD AUTO: 2.9 %
LYMPHOCYTES NFR BLD AUTO: 6.4 %
MCH RBC QN AUTO: 31 PG (ref 26–34)
MCH RBC QN AUTO: 31.3 PG (ref 26–34)
MCHC RBC AUTO-ENTMCNC: 31.5 G/DL (ref 32–36)
MCHC RBC AUTO-ENTMCNC: 31.6 G/DL (ref 32–36)
MCV RBC AUTO: 98 FL (ref 80–100)
MCV RBC AUTO: 99 FL (ref 80–100)
MONOCYTES # BLD AUTO: 0.15 X10*3/UL (ref 0.05–0.8)
MONOCYTES # BLD AUTO: 0.33 X10*3/UL (ref 0.05–0.8)
MONOCYTES NFR BLD AUTO: 3.7 %
MONOCYTES NFR BLD AUTO: 4.1 %
NEUTROPHILS # BLD AUTO: 3.66 X10*3/UL (ref 1.6–5.5)
NEUTROPHILS # BLD AUTO: 7.47 X10*3/UL (ref 1.6–5.5)
NEUTROPHILS NFR BLD AUTO: 89.7 %
NEUTROPHILS NFR BLD AUTO: 92.6 %
NRBC BLD-RTO: 0 /100 WBCS (ref 0–0)
NRBC BLD-RTO: 0 /100 WBCS (ref 0–0)
PLATELET # BLD AUTO: 197 X10*3/UL (ref 150–450)
PLATELET # BLD AUTO: 223 X10*3/UL (ref 150–450)
POTASSIUM SERPL-SCNC: 4.3 MMOL/L (ref 3.5–5.3)
POTASSIUM SERPL-SCNC: 4.4 MMOL/L (ref 3.5–5.3)
RBC # BLD AUTO: 5.14 X10*6/UL (ref 4–5.2)
RBC # BLD AUTO: 5.23 X10*6/UL (ref 4–5.2)
SODIUM SERPL-SCNC: 128 MMOL/L (ref 136–145)
SODIUM SERPL-SCNC: 133 MMOL/L (ref 136–145)
WBC # BLD AUTO: 4.1 X10*3/UL (ref 4.4–11.3)
WBC # BLD AUTO: 8.1 X10*3/UL (ref 4.4–11.3)

## 2024-09-13 PROCEDURE — 2500000005 HC RX 250 GENERAL PHARMACY W/O HCPCS: Performed by: EMERGENCY MEDICINE

## 2024-09-13 PROCEDURE — 36415 COLL VENOUS BLD VENIPUNCTURE: CPT | Performed by: INTERNAL MEDICINE

## 2024-09-13 PROCEDURE — 2500000001 HC RX 250 WO HCPCS SELF ADMINISTERED DRUGS (ALT 637 FOR MEDICARE OP): Performed by: INTERNAL MEDICINE

## 2024-09-13 PROCEDURE — 99222 1ST HOSP IP/OBS MODERATE 55: CPT | Performed by: INTERNAL MEDICINE

## 2024-09-13 PROCEDURE — 1200000002 HC GENERAL ROOM WITH TELEMETRY DAILY

## 2024-09-13 PROCEDURE — 85025 COMPLETE CBC W/AUTO DIFF WBC: CPT | Performed by: INTERNAL MEDICINE

## 2024-09-13 PROCEDURE — 2500000005 HC RX 250 GENERAL PHARMACY W/O HCPCS: Performed by: INTERNAL MEDICINE

## 2024-09-13 PROCEDURE — 9420000001 HC RT PATIENT EDUCATION 5 MIN

## 2024-09-13 PROCEDURE — 80048 BASIC METABOLIC PNL TOTAL CA: CPT | Performed by: INTERNAL MEDICINE

## 2024-09-13 PROCEDURE — 84145 PROCALCITONIN (PCT): CPT | Mod: AHULAB | Performed by: CLINICAL NURSE SPECIALIST

## 2024-09-13 PROCEDURE — 99223 1ST HOSP IP/OBS HIGH 75: CPT | Performed by: CLINICAL NURSE SPECIALIST

## 2024-09-13 PROCEDURE — 2500000002 HC RX 250 W HCPCS SELF ADMINISTERED DRUGS (ALT 637 FOR MEDICARE OP, ALT 636 FOR OP/ED): Performed by: INTERNAL MEDICINE

## 2024-09-13 PROCEDURE — 87449 NOS EACH ORGANISM AG IA: CPT | Mod: AHULAB | Performed by: INTERNAL MEDICINE

## 2024-09-13 PROCEDURE — 2500000002 HC RX 250 W HCPCS SELF ADMINISTERED DRUGS (ALT 637 FOR MEDICARE OP, ALT 636 FOR OP/ED): Performed by: PHARMACIST

## 2024-09-13 PROCEDURE — 2500000004 HC RX 250 GENERAL PHARMACY W/ HCPCS (ALT 636 FOR OP/ED): Performed by: INTERNAL MEDICINE

## 2024-09-13 PROCEDURE — 94640 AIRWAY INHALATION TREATMENT: CPT

## 2024-09-13 PROCEDURE — 87899 AGENT NOS ASSAY W/OPTIC: CPT | Mod: AHULAB | Performed by: INTERNAL MEDICINE

## 2024-09-13 RX ORDER — ALUMINUM HYDROXIDE, MAGNESIUM HYDROXIDE, AND SIMETHICONE 1200; 120; 1200 MG/30ML; MG/30ML; MG/30ML
20 SUSPENSION ORAL 4 TIMES DAILY PRN
Status: DISCONTINUED | OUTPATIENT
Start: 2024-09-13 | End: 2024-09-17 | Stop reason: HOSPADM

## 2024-09-13 RX ORDER — PANTOPRAZOLE SODIUM 40 MG/1
40 TABLET, DELAYED RELEASE ORAL
Status: DISCONTINUED | OUTPATIENT
Start: 2024-09-14 | End: 2024-09-14

## 2024-09-13 SDOH — SOCIAL STABILITY: SOCIAL NETWORK
DO YOU BELONG TO ANY CLUBS OR ORGANIZATIONS SUCH AS CHURCH GROUPS UNIONS, FRATERNAL OR ATHLETIC GROUPS, OR SCHOOL GROUPS?: YES

## 2024-09-13 SDOH — ECONOMIC STABILITY: INCOME INSECURITY: IN THE PAST 12 MONTHS, HAS THE ELECTRIC, GAS, OIL, OR WATER COMPANY THREATENED TO SHUT OFF SERVICE IN YOUR HOME?: NO

## 2024-09-13 SDOH — SOCIAL STABILITY: SOCIAL NETWORK: HOW OFTEN DO YOU GET TOGETHER WITH FRIENDS OR RELATIVES?: MORE THAN THREE TIMES A WEEK

## 2024-09-13 SDOH — ECONOMIC STABILITY: INCOME INSECURITY: IN THE LAST 12 MONTHS, WAS THERE A TIME WHEN YOU WERE NOT ABLE TO PAY THE MORTGAGE OR RENT ON TIME?: NO

## 2024-09-13 SDOH — ECONOMIC STABILITY: HOUSING INSECURITY: AT ANY TIME IN THE PAST 12 MONTHS, WERE YOU HOMELESS OR LIVING IN A SHELTER (INCLUDING NOW)?: NO

## 2024-09-13 SDOH — ECONOMIC STABILITY: HOUSING INSECURITY: IN THE PAST 12 MONTHS, HOW MANY TIMES HAVE YOU MOVED WHERE YOU WERE LIVING?: 0

## 2024-09-13 SDOH — ECONOMIC STABILITY: INCOME INSECURITY: HOW HARD IS IT FOR YOU TO PAY FOR THE VERY BASICS LIKE FOOD, HOUSING, MEDICAL CARE, AND HEATING?: NOT HARD AT ALL

## 2024-09-13 SDOH — SOCIAL STABILITY: SOCIAL NETWORK: HOW OFTEN DO YOU ATTEND CHURCH OR RELIGIOUS SERVICES?: MORE THAN 4 TIMES PER YEAR

## 2024-09-13 SDOH — SOCIAL STABILITY: SOCIAL NETWORK
IN A TYPICAL WEEK, HOW MANY TIMES DO YOU TALK ON THE PHONE WITH FAMILY, FRIENDS, OR NEIGHBORS?: MORE THAN THREE TIMES A WEEK

## 2024-09-13 SDOH — ECONOMIC STABILITY: TRANSPORTATION INSECURITY
IN THE PAST 12 MONTHS, HAS THE LACK OF TRANSPORTATION KEPT YOU FROM MEDICAL APPOINTMENTS OR FROM GETTING MEDICATIONS?: NO

## 2024-09-13 SDOH — HEALTH STABILITY: PHYSICAL HEALTH: ON AVERAGE, HOW MANY DAYS PER WEEK DO YOU ENGAGE IN MODERATE TO STRENUOUS EXERCISE (LIKE A BRISK WALK)?: 2 DAYS

## 2024-09-13 SDOH — HEALTH STABILITY: MENTAL HEALTH
STRESS IS WHEN SOMEONE FEELS TENSE, NERVOUS, ANXIOUS, OR CAN'T SLEEP AT NIGHT BECAUSE THEIR MIND IS TROUBLED. HOW STRESSED ARE YOU?: NOT AT ALL

## 2024-09-13 SDOH — HEALTH STABILITY: PHYSICAL HEALTH: ON AVERAGE, HOW MANY MINUTES DO YOU ENGAGE IN EXERCISE AT THIS LEVEL?: 10 MIN

## 2024-09-13 SDOH — SOCIAL STABILITY: SOCIAL NETWORK: HOW OFTEN DO YOU ATTENT MEETINGS OF THE CLUB OR ORGANIZATION YOU BELONG TO?: MORE THAN 4 TIMES PER YEAR

## 2024-09-13 SDOH — ECONOMIC STABILITY: FOOD INSECURITY: WITHIN THE PAST 12 MONTHS, YOU WORRIED THAT YOUR FOOD WOULD RUN OUT BEFORE YOU GOT MONEY TO BUY MORE.: NEVER TRUE

## 2024-09-13 SDOH — ECONOMIC STABILITY: TRANSPORTATION INSECURITY
IN THE PAST 12 MONTHS, HAS LACK OF TRANSPORTATION KEPT YOU FROM MEETINGS, WORK, OR FROM GETTING THINGS NEEDED FOR DAILY LIVING?: NO

## 2024-09-13 SDOH — ECONOMIC STABILITY: FOOD INSECURITY: WITHIN THE PAST 12 MONTHS, THE FOOD YOU BOUGHT JUST DIDN'T LAST AND YOU DIDN'T HAVE MONEY TO GET MORE.: NEVER TRUE

## 2024-09-13 SDOH — SOCIAL STABILITY: SOCIAL NETWORK: ARE YOU MARRIED, WIDOWED, DIVORCED, SEPARATED, NEVER MARRIED, OR LIVING WITH A PARTNER?: WIDOWED

## 2024-09-13 SDOH — HEALTH STABILITY: MENTAL HEALTH
HOW OFTEN DO YOU NEED TO HAVE SOMEONE HELP YOU WHEN YOU READ INSTRUCTIONS, PAMPHLETS, OR OTHER WRITTEN MATERIAL FROM YOUR DOCTOR OR PHARMACY?: NEVER

## 2024-09-13 ASSESSMENT — COGNITIVE AND FUNCTIONAL STATUS - GENERAL
MOBILITY SCORE: 20
CLIMB 3 TO 5 STEPS WITH RAILING: A LITTLE
WALKING IN HOSPITAL ROOM: A LITTLE
STANDING UP FROM CHAIR USING ARMS: A LITTLE
DRESSING REGULAR LOWER BODY CLOTHING: A LITTLE
TOILETING: A LITTLE
EATING MEALS: A LITTLE
DAILY ACTIVITIY SCORE: 21
MOVING TO AND FROM BED TO CHAIR: A LITTLE

## 2024-09-13 ASSESSMENT — ENCOUNTER SYMPTOMS
FATIGUE: 1
CONSTIPATION: 0
DIARRHEA: 0
TROUBLE SWALLOWING: 0
WHEEZING: 1
COUGH: 1
FEVER: 0
CHILLS: 0
DIZZINESS: 1
VOMITING: 0
SHORTNESS OF BREATH: 1
BACK PAIN: 1
NAUSEA: 0
ACTIVITY CHANGE: 1
UNEXPECTED WEIGHT CHANGE: 1
RHINORRHEA: 1
SINUS PRESSURE: 1
APPETITE CHANGE: 1

## 2024-09-13 ASSESSMENT — ACTIVITIES OF DAILY LIVING (ADL): LACK_OF_TRANSPORTATION: NO

## 2024-09-13 ASSESSMENT — PAIN SCALES - GENERAL
PAINLEVEL_OUTOF10: 0 - NO PAIN
PAINLEVEL_OUTOF10: 0 - NO PAIN

## 2024-09-13 ASSESSMENT — PAIN - FUNCTIONAL ASSESSMENT
PAIN_FUNCTIONAL_ASSESSMENT: 0-10
PAIN_FUNCTIONAL_ASSESSMENT: 0-10

## 2024-09-13 NOTE — CARE PLAN
The patient's goals for the shift include decreasing supplemental oxygen need.    The clinical goals for the shift include Pt will keep oxygen saturation at 90% or above.      Problem: Fall/Injury  Goal: Not fall by end of shift  Outcome: Progressing  Goal: Be free from injury by end of the shift  Outcome: Progressing  Goal: Verbalize understanding of personal risk factors for fall in the hospital  Outcome: Progressing  Goal: Verbalize understanding of risk factor reduction measures to prevent injury from fall in the home  Outcome: Progressing  Goal: Use assistive devices by end of the shift  Outcome: Progressing  Goal: Pace activities to prevent fatigue by end of the shift  Outcome: Progressing     Problem: Pain - Adult  Goal: Verbalizes/displays adequate comfort level or baseline comfort level  Outcome: Progressing     Problem: Safety - Adult  Goal: Free from fall injury  Outcome: Progressing     Problem: Discharge Planning  Goal: Discharge to home or other facility with appropriate resources  Outcome: Progressing     Problem: Chronic Conditions and Co-morbidities  Goal: Patient's chronic conditions and co-morbidity symptoms are monitored and maintained or improved  Outcome: Progressing

## 2024-09-13 NOTE — CARE PLAN
The patient's goals for the shift include pt will have decrease sob during shift    The clinical goals for the shift include pt will have increase pulse ox levels

## 2024-09-13 NOTE — H&P
Humera Villegas is a 74 y.o. female   Shortness of Breath       Patient with a past medical history of hypertension dyslipidemia COPD anxiety disorder history of osteoporosis currently on Prolia comes in with worsening shortness of breath loss of appetite and weight loss of about 10 pounds over the last couple of months  In the emergency room she was noted to be severely hypoxic  CT PE was done which was negative for clots but did show bilateral opacities and severe emphysema  Started on high flow oxygen  Patient denies any nausea vomiting or diarrhea    Past Medical History  Past Medical History:   Diagnosis Date    Compression fracture of thoracic vertebra (Multi) 06/04/2012    Formatting of this note might be different from the original. T8    Encounter for screening for malignant neoplasm of colon     Encounter for screening colonoscopy    Encounter for screening for malignant neoplasm of vagina     Vaginal Pap smear    Other conditions influencing health status     Compression fracture    Pain in thoracic spine 06/04/2012    Personal history of other diseases of the digestive system     History of hiatal hernia    Personal history of other endocrine, nutritional and metabolic disease     History of hypercholesterolemia    Personal history of other medical treatment     History of screening mammography    Strain of muscle and tendon of back wall of thorax, initial encounter     Strain of thoracic spine    Wedge compression fracture of unspecified thoracic vertebra, initial encounter for closed fracture (Multi)     Thoracic compression fracture       Surgical History  Past Surgical History:   Procedure Laterality Date    OTHER SURGICAL HISTORY  10/30/2014    Colonoscopy (Fiberoptic) Screening        Social History  She reports that she has never smoked. She has never been exposed to tobacco smoke. She has never used smokeless tobacco. She reports that she does not drink alcohol and does not use drugs.    Family  History  Family History   Problem Relation Name Age of Onset    Other (cardiac disorder) Mother      Diabetes Mother      Other (cardiac disorder) Father          Allergies  Amoxicillin and Penicillin    Review of Systems   Respiratory:  Positive for shortness of breath.         Constitutional: No fevers or chills  Eyes: no blurred vision and no diplopia.   ENT: no hearing loss, no tinnitus, no earache, no sore throat, no hoarseness and no swollen glands in the neck.   Cardiovascular: no chest pain, no tightness or heavy pressure, no shortness of breath, no palpitations and no lower extremity edema.   Respiratory: Short of breath  Gastrointestinal: no change in bowel habits, no diarrhea, no constipation, no bloody stools, no nausea, no vomiting, no abdominal pain, no signs and symptoms of ulcer disease, no bonita colored stools and no intolerance to fatty foods.   Genitourinary: no urinary frequency, no dysuria, no hematuria, no burning sensation during urination, urinary stream is not smaller and urinary stream does not start and stop.   Musculoskeletal: no arthralgias, no joint stiffness, no muscle weakness, no back pain and no difficulty walking.   Skin: no rashes, no change in skin color and pigmentation, no skin lesions and no skin lumps.   Neurological: no headaches, no dizziness, no seizures, no tingling, no numbness, no signs and symptoms of stroke and no limb weakness.   Psychiatric: no confusion, no memory lapses or loss, no depression and no sleep disturbances.   Endocrine:  no excessive thirst, no dry skin, no cold intolerance, no heat intolerance and no increased urinary frequency.   Hematologic/Lymphatic: is not slow to heal, does not bleed easily, does not bruise easily, no thrombophlebitis, no anemia and no history of blood transfusion.   All other systems have been reviewed and are negative for complaint.     Vitals:    09/13/24 1537   BP: 126/68   Pulse:    Resp:    Temp: 36.7 °C (98.1 °F)   SpO2:  96%        Scheduled medications  amLODIPine, 2.5 mg, oral, Daily  atorvastatin, 20 mg, oral, Daily  budesonide, 0.5 mg, nebulization, BID  cefTRIAXone, 2 g, intravenous, q24h  enoxaparin, 40 mg, subcutaneous, q24h JOSEPH  formoterol, 20 mcg, nebulization, BID  gabapentin, 100 mg, oral, TID  ipratropium-albuteroL, 3 mL, nebulization, TID  methylPREDNISolone sodium succinate (PF), 40 mg, intravenous, q12h  oxygen, , inhalation, Continuous - 02/gases  [START ON 9/14/2024] pantoprazole, 40 mg, oral, Daily before breakfast      Continuous medications     PRN medications  PRN medications: ALPRAZolam, alum-mag hydroxide-simeth, ipratropium-albuteroL, oxygen    Results from last 7 days   Lab Units 09/13/24  1429 09/13/24  0658 09/12/24  1413   WBC AUTO x10*3/uL 8.1 4.1* 5.8   HEMOGLOBIN g/dL 16.1* 16.2* 18.1*   HEMATOCRIT % 51.0* 51.4* 56.2*   PLATELETS AUTO x10*3/uL 223 197 227     Results from last 7 days   Lab Units 09/13/24  1303 09/13/24  0658 09/12/24  1413 09/09/24  1339   SODIUM mmol/L 128* 133* 131*  --    POTASSIUM mmol/L 4.4 4.3 4.0  --    CHLORIDE mmol/L 100 99 97*  --    CO2 mmol/L 18* 23 23  --    BUN mg/dL 16 15 15  --    CREATININE mg/dL 0.60 0.68 0.80  --    CALCIUM mg/dL 8.1* 8.4* 9.7  --    PROTEIN TOTAL g/dL  --   --  8.4* 7.6   BILIRUBIN TOTAL mg/dL  --   --  1.4* 1.4*   ALK PHOS U/L  --   --  65 68   ALT U/L  --   --  54* 71*   AST U/L  --   --  33 37   GLUCOSE mg/dL 149* 168* 131*  --      Results from last 7 days   Lab Units 09/12/24  1637 09/12/24  1413   TROPHS ng/L 33* 30*        CT angio chest for pulmonary embolism   Final Result   There does not appear to be any evidence of pulmonary embolus or   thoracic aortic aneurysm or dissection.     There is very severe central lobar emphysematous change in the lungs   with an 8 mm  noncalcified subpleural nodule in the inferior left lung   base laterally and some linear scarring or discoid atelectasis in the   posterior left lower lobe but there is no  significant alveolar   consolidation and no effusion or pneumothorax..   There is moderately large retrocardiac hiatal hernia.   Signed by Suhail Manjarrez MD      XR chest 2 views   Final Result   1.  Bibasilar opacities, indicative of atelectasis or pneumonia.   2.  COPD.   Signed by Tomas Ugarte MD          Physical Exam      Constitutional   General appearance: Alert and in no acute distress.   Eyes   Inspection of eyes: Sclera and conjunctiva were normal.    Pupil exam: Pupils were equal in size. Extraocular movements were intact.   Pulmonary   Respiratory assessment: Diminished breath sounds bilaterally  Cardiovascular   Auscultation of heart: Apical pulse normal, heart rate and rhythm normal, normal S1 and S2, no murmurs and no pericardial rub.    Exam for edema: No peripheral edema.   Abdomen   Abdominal Exam: No bruits, normal bowel sounds, soft, non-tender, no abdominal mass palpated.    Liver and Spleen exam: No hepato-splenomegaly.   Musculoskeletal     Inspection of digits and nails: No clubbing or cyanosis of the fingernails.    Inspection/palpation of joints, bones and muscles: No joint swelling. Normal movement of all extremities.   Skin   Skin inspection: Normal skin color and pigmentation, normal skin turgor and no visible rash.   Neurologic   Cranial nerves: Nerves 2-12 were intact, no focal neuro defects.   Psychiatric   Orientation: Oriented to person, place, and time.    Mood and affect: Normal.      Assessment/Plan      #Acute exacerbation of COPD  #Acute on chronic respiratory failure  #Essential polycythemia  #Elevated right ventricular systolic pressures seen on echo done in December  Started DuoNebs/Solu-Medrol/antibiotics  Will repeat an echo and get cardiology consult for possible right heart cath  Pulmonary consulted    #Anxiety disorder  Resume home medications    #Hypertension  Stable continue home medications    #Dyslipidemia  Stable continue home medications

## 2024-09-13 NOTE — PROGRESS NOTES
"Humera Villegas (\"Sis\") is a 74 y.o. female who was referred to the Clinical Pharmacy Team to complete a virtual Transitions of Care encounter for discharge medication optimization. The patient was referred for their COPD.    Attending: Dr. Uriel Gunderson    PCP: Dr. Gabby Chou    _______________________________________________________________________  PHARMACY ASSESSMENT    Home Pharmacy: [facility]  Meds to beds? Unable to discuss    Affordability/Accessibility: Trelegy is expensive but cost doesn't prevent good adherence  Adherence/Organization: no issues  Adverse Effects: no issues  _______________________________________________________________________  ASTHMA/COPD ASSESSMENT    Trelegy is expensive with insurance coverage. Patient not interested in going to 2 inhalers if less expensive due to inconvenience. Cost comparison not done with Breztri, but this is also 2 puffs twice daily opposed to Trelegy being once daily.    CURRENT PHARMACOTHERAPY  - Trelegy 100-62.5-25 mcg 1 puff daily    SECONDARY PREVENTION  - Influenza: Recommended yearly (DUE)  - Pneumococcal: Prevnar 13 in 2018; Prevnar 20 in 2023; Pneumovax 23 in 2016  - RSV: no history  - COVID: last record 10/2021    EXACERBATION HISTORY  - When was your last hospitalization for an exacerbation? current  - When was the last time you were treated with antibiotics and/or steroids? Current  ___________________________________________________________________  PATIENT EDUCATION/GOALS  - Discussed [facility] PAP but patient does not think she will qualify based on income. Discussed still benefits to talking with pharmacy team after discharge  - Discussed a little on changes to Medicare for out of pocket expenses at the pharmacy that will be effective next year and how she should be able to shop for a plan during open enrollment that may cover the Trelegy a bit better.  - Answered all patient questions and concerns to best of my " ability  _______________________________________________________________________  RECOMMENDATIONS/PLAN  Continue all medications per medical team  Please send prescriptions to [facility] pharmacy for assistance on insurance prior authorization and copay. Prescriptions will be delivered to the patient's bedside prior to discharge with the Meds to Beds program.   Continuity of care will be provided by PCP and clinical pharmacy team      Charlotte Hayden PharmD    Verbal consent to manage patient's drug therapy was obtained from the patient. They were informed they may decline to participate or withdraw from participation in pharmacy services at any time.

## 2024-09-13 NOTE — PROGRESS NOTES
09/13/24 0953   [facility] Disability Status   Are you deaf or do you have serious difficulty hearing? N   Are you blind or do you have serious difficulty seeing, even when wearing glasses? N   Because of a physical, mental, or emotional condition, do you have serious difficulty concentrating, remembering, or making decisions? (5 years old or older) N   Do you have serious difficulty walking or climbing stairs? N   Do you have serious difficulty dressing or bathing? N   Because of a physical, mental, or emotional condition, do you have serious difficulty doing errands alone such as visiting the doctor? N        not indicated n/a see HPI

## 2024-09-13 NOTE — PROGRESS NOTES
Pharmacy Medication History Review    Humera Villegas is a 74 y.o. female admitted for COPD exacerbation (Multi). Pharmacy reviewed the patient's vxual-xs-mceidzvaz medications and allergies for accuracy.    The list below reflectives the updated PTA list. Please review each medication in order reconciliation for additional clarification and justification.  Prior to Admission Medications   Prescriptions Last Dose Informant   ALPRAZolam (Xanax) 0.5 mg tablet Unknown    Sig: Take 1 tablet (0.5 mg) by mouth 3 times a day as needed for anxiety for up to 7 days.   amLODIPine (Norvasc) 2.5 mg tablet Unknown Self   Sig: TAKE 1 TABLET DAILY   atorvastatin (Lipitor) 20 mg tablet Unknown Self   Sig: TAKE 1 TABLET DAILY   cholecalciferol (Vitamin D3) 25 MCG (1000 UT) tablet Unknown Self   Sig: Take 1 tablet (1,000 Units) by mouth once daily.   cyanocobalamin (Vitamin B-12) 1,000 mcg tablet Unknown Self   Sig: Take 1 tablet (1,000 mcg) by mouth once daily.   denosumab (Prolia) 60 mg/mL syringe Unknown Self   Sig: Inject 1 mL (60 mg) under the skin every 6 months.   fluticasone (Flonase) 50 mcg/actuation nasal spray Unknown Self   Sig: Administer 2 sprays into each nostril once daily. Shake gently. Before first use, prime pump. After use, clean tip and replace cap.   fluticasone-umeclidin-vilanter (Trelegy Ellipta) 100-62.5-25 mcg blister with device Unknown Self   Sig: Inhale 1 puff once daily.   omeprazole (PriLOSEC) 20 mg DR capsule Unknown Self   Sig: TAKE 1 CAPSULE DAILY   Patient taking differently: Take 1 capsule (20 mg) by mouth once daily as needed.      Facility-Administered Medications: None         The list below reflectives the updated allergy list. Please review each documented allergy for additional clarification and justification.  Allergies  Reviewed by Sana Nash on 9/12/2024        Severity Reactions Comments    Amoxicillin Not Specified Unknown     Penicillin Not Specified Other             Below are  additional concerns with the patient's PTA list.  Spoke to patient with family at bedside    Sana Nash

## 2024-09-13 NOTE — CONSULTS
Inpatient consult to Pulmonology  Consult performed by: FRANCIA Malhotra-CNS  Consult ordered by: FRANCIA Lucas-CNP  Reason for consult: COPD, pneumonia, respiratory failure       History Of Present Illness  Humera Villegas is a 74 y.o. female with PMH of HTN, HLD, COPD (not on O2), MOMO, osteoporosis (on Prolia) presented to [facility] ED from her PCPs office with a concern for hypoxia.  In her PCPs office there was a concern for low pulse ox as well as increased dyspnea with exertion.  Patient reports intermittent episodes of hypoxia with dyspnea on exertion over the past few months.  Denied fever, chills, cough, congestion, nausea, vomiting and diarrhea.  ED room air Pox 62%; placed on nonrebreather.  On exam patient was tachypneic with increased work of breathing and poor air movement.  EKG showing sinus tachycardia without acute ischemia; troponins 30-33.  Labs without leukocytosis, .  Flu A/B, RSV, COVID all negative.  VBG pH 7.43, pCO2 39, HCO3 25.9.  CXR with bibasilar opacities c/w atelectasis vs pneumonia.  CT PE negative for PE with demonstration of significant diffuse emphysematous changes.  She was treated with DuoNebs, Solu-Medrol, magnesium, azithromycin, ceftriaxone and doxycycline.  She was able to be weaned to 6 L NC and admitted for COPD exacerbation, pneumonia and respiratory failure for which pulmonology is consulted.    Patient seen and examined with presentation as above.  Patient reported more acute symptoms over the past week of increased dyspnea, especially with exertion, cough with yellow-tinged phlegm and intermittent wheezing.  She reports overall increased fatigue over the past several months with reduced appetite and ~ 4 pound weight loss.  She notes that she now requires ~ 5 minutes to recover every time she climbs the 12 stairs to her bedroom.  More recently she notes dyspnea beginning after she walks ~10 feet on flat surface, again recovering within 5 minutes.  She  reports recent sinus pressure with rhinorrhea and + PND.  She denies hemoptysis and orthopnea and reports excellent sleep.  She had PFTs in  diagnostic of COPD.  She is managed by her primary care physician and does not currently follow with pulmonology.  She is not on home O2.  She reports taking her Trelegy daily.  She does have an albuterol rescue inhaler that she reports as old and probably .  She did use this inhaler over the past few days 1-2 times a day but did not feel it helped.  We discussed multiple reasons she would benefit from establishing care with pulmonology including management of her severe emphysema/COPD, pulmonary hypertension and the incidentally found pulmonary nodule.    Initially she was placed on nonrebreather was able to be weaned to 6 L nasal cannula.  She was further weaned down to 4 L satting 94 to 95%.  I weaned her to 3 L throughout the exam and she was able to maintain saturation 90 - 92%.      Past Medical History  She has a past medical history of Compression fracture of thoracic vertebra (Multi) (2012), Encounter for screening for malignant neoplasm of colon, Encounter for screening for malignant neoplasm of vagina, Other conditions influencing health status, Pain in thoracic spine (2012), Personal history of other diseases of the digestive system, Personal history of other endocrine, nutritional and metabolic disease, Personal history of other medical treatment, Strain of muscle and tendon of back wall of thorax, initial encounter, and Wedge compression fracture of unspecified thoracic vertebra, initial encounter for closed fracture (Multi).    Surgical History  She has a past surgical history that includes Other surgical history (10/30/2014).     Social History  She reports that she has never smoked. She has never been exposed to tobacco smoke. She has never used smokeless tobacco. She reports that she does not drink alcohol and does not use drugs.   Reports a smoking history of 40 years at roughly 3/4 to 1 pack/day, quitting about 14 years ago.  She is a retired  with no known work or environmental exposures.    Family History  Family History   Problem Relation Name Age of Onset    Other (cardiac disorder) Mother      Diabetes Mother      Other (cardiac disorder) Father     No history of cancer and specifically lung cancer within her family; patient reports significant cardiac disease in her family (parents and siblings)     Allergies  Amoxicillin and Penicillin    Review of Systems  Review of Systems   Constitutional:  Positive for activity change, appetite change, fatigue and unexpected weight change. Negative for chills and fever.   HENT:  Positive for postnasal drip, rhinorrhea and sinus pressure. Negative for trouble swallowing.    Respiratory:  Positive for cough, shortness of breath and wheezing.    Cardiovascular:  Negative for chest pain and leg swelling.   Gastrointestinal:  Negative for constipation, diarrhea, nausea and vomiting.   Musculoskeletal:  Positive for back pain.   Neurological:  Positive for dizziness.       Physical Exam  Constitutional:   Thin, NAD, cooperative  HENT: Atraumatic, dry mucous membranes  Eyes: PERRL, nonicteric  Neck: Supple, no JVD  Cardiovascular: Regular, HR 80s, + murmur  Pulmonary: Poor to fair air entry with diminished bases; no rhonchi, rales or wheezing noted; mild conversational dyspnea after 15 minutes of talking  Abdominal: Soft, nontender, nondistended, + BS  Musculoskeletal: Good active range of motion all extremities with fair strength  Extremities:   No BLE edema  Lymphadenopathy: No nuchal LAP  Skin: Warm and dry with areas of ecchymosis  Neurological: A&O x 3; speech clear and appropriate; no focal deficits noted  Psychiatric:     Appropriate mood and behavior with minor periods of anxiety    Medications:  amLODIPine, 2.5 mg, oral, Daily  atorvastatin, 20 mg, oral, Daily  budesonide, 0.5 mg,  "nebulization, BID  cefTRIAXone, 2 g, intravenous, q24h  enoxaparin, 40 mg, subcutaneous, q24h JOSEPH  formoterol, 20 mcg, nebulization, BID  gabapentin, 100 mg, oral, TID  ipratropium-albuteroL, 3 mL, nebulization, TID  levoFLOXacin, 750 mg, intravenous, q48h  methylPREDNISolone sodium succinate (PF), 40 mg, intravenous, q12h  oxygen, , inhalation, Continuous - 02/gases     PRN medications: ALPRAZolam, ipratropium-albuteroL, oxygen      Last Recorded Vitals  Blood pressure 111/64, pulse 55, temperature 36.4 °C (97.6 °F), resp. rate 18, height 1.575 m (5' 2\"), weight 48.4 kg (106 lb 9.6 oz), SpO2 98%.    Relevant Results  Results for orders placed or performed during the hospital encounter of 09/12/24 (from the past 24 hour(s))   Electrocardiogram, 12-lead PRN ACS symptoms   Result Value Ref Range    Ventricular Rate 103 BPM    Atrial Rate 103 BPM    SC Interval 136 ms    QRS Duration 72 ms    QT Interval 324 ms    QTC Calculation(Bazett) 424 ms    P Axis 76 degrees    R Axis 156 degrees    T Axis -38 degrees    QRS Count 17 beats    Q Onset 221 ms    P Onset 153 ms    P Offset 202 ms    T Offset 383 ms    QTC Fredericia 388 ms   CBC and Auto Differential   Result Value Ref Range    WBC 5.8 4.4 - 11.3 x10*3/uL    nRBC 0.0 0.0 - 0.0 /100 WBCs    RBC 5.82 (H) 4.00 - 5.20 x10*6/uL    Hemoglobin 18.1 (H) 12.0 - 16.0 g/dL    Hematocrit 56.2 (H) 36.0 - 46.0 %    MCV 97 80 - 100 fL    MCH 31.1 26.0 - 34.0 pg    MCHC 32.2 32.0 - 36.0 g/dL    RDW 16.4 (H) 11.5 - 14.5 %    Platelets 227 150 - 450 x10*3/uL    Neutrophils % 80.5 40.0 - 80.0 %    Immature Granulocytes %, Automated 0.3 0.0 - 0.9 %    Lymphocytes % 10.7 13.0 - 44.0 %    Monocytes % 7.9 2.0 - 10.0 %    Eosinophils % 0.3 0.0 - 6.0 %    Basophils % 0.3 0.0 - 2.0 %    Neutrophils Absolute 4.67 1.60 - 5.50 x10*3/uL    Immature Granulocytes Absolute, Automated 0.02 0.00 - 0.50 x10*3/uL    Lymphocytes Absolute 0.62 (L) 0.80 - 3.00 x10*3/uL    Monocytes Absolute 0.46 0.05 - " 0.80 x10*3/uL    Eosinophils Absolute 0.02 0.00 - 0.40 x10*3/uL    Basophils Absolute 0.02 0.00 - 0.10 x10*3/uL   Comprehensive metabolic panel   Result Value Ref Range    Glucose 131 (H) 74 - 99 mg/dL    Sodium 131 (L) 136 - 145 mmol/L    Potassium 4.0 3.5 - 5.3 mmol/L    Chloride 97 (L) 98 - 107 mmol/L    Bicarbonate 23 21 - 32 mmol/L    Anion Gap 15 10 - 20 mmol/L    Urea Nitrogen 15 6 - 23 mg/dL    Creatinine 0.80 0.50 - 1.05 mg/dL    eGFR 76 >60 mL/min/1.73m*2    Calcium 9.7 8.6 - 10.3 mg/dL    Albumin 4.1 3.4 - 5.0 g/dL    Alkaline Phosphatase 65 33 - 136 U/L    Total Protein 8.4 (H) 6.4 - 8.2 g/dL    AST 33 9 - 39 U/L    Bilirubin, Total 1.4 (H) 0.0 - 1.2 mg/dL    ALT 54 (H) 7 - 45 U/L   Troponin I, High Sensitivity   Result Value Ref Range    Troponin I, High Sensitivity 30 (H) 0 - 13 ng/L   B-Type Natriuretic Peptide   Result Value Ref Range     (H) 0 - 99 pg/mL   Blood Gas Venous Full Panel   Result Value Ref Range    POCT pH, Venous 7.43 7.33 - 7.43 pH    POCT pCO2, Venous 39 (L) 41 - 51 mm Hg    POCT pO2, Venous 34 (L) 35 - 45 mm Hg    POCT SO2, Venous 51 45 - 75 %    POCT Oxy Hemoglobin, Venous 49.5 45.0 - 75.0 %    POCT Hematocrit Calculated, Venous 52.0 (H) 36.0 - 46.0 %    POCT Sodium, Venous 128 (L) 136 - 145 mmol/L    POCT Potassium, Venous 5.6 (H) 3.5 - 5.3 mmol/L    POCT Chloride, Venous 98 98 - 107 mmol/L    POCT Ionized Calicum, Venous 1.09 (L) 1.10 - 1.33 mmol/L    POCT Glucose, Venous 145 (H) 74 - 99 mg/dL    POCT Lactate, Venous 1.8 0.4 - 2.0 mmol/L    POCT Base Excess, Venous 1.6 -2.0 - 3.0 mmol/L    POCT HCO3 Calculated, Venous 25.9 22.0 - 26.0 mmol/L    POCT Hemoglobin, Venous 17.4 (H) 12.0 - 16.0 g/dL    POCT Anion Gap, Venous 10.0 10.0 - 25.0 mmol/L    Patient Temperature 37.0 degrees Celsius    FiO2 36 %    Apparatus NON REBREATHER    Sars-CoV-2 PCR   Result Value Ref Range    Coronavirus 2019, PCR Not Detected Not Detected   Influenza A, and B PCR   Result Value Ref Range     Flu A Result Not Detected Not Detected    Flu B Result Not Detected Not Detected   RSV PCR   Result Value Ref Range    RSV PCR Not Detected Not Detected   Troponin I, High Sensitivity   Result Value Ref Range    Troponin I, High Sensitivity 33 (H) 0 - 13 ng/L   CBC and Auto Differential   Result Value Ref Range    WBC 4.1 (L) 4.4 - 11.3 x10*3/uL    nRBC 0.0 0.0 - 0.0 /100 WBCs    RBC 5.23 (H) 4.00 - 5.20 x10*6/uL    Hemoglobin 16.2 (H) 12.0 - 16.0 g/dL    Hematocrit 51.4 (H) 36.0 - 46.0 %    MCV 98 80 - 100 fL    MCH 31.0 26.0 - 34.0 pg    MCHC 31.5 (L) 32.0 - 36.0 g/dL    RDW 15.4 (H) 11.5 - 14.5 %    Platelets 197 150 - 450 x10*3/uL    Neutrophils % 89.7 40.0 - 80.0 %    Immature Granulocytes %, Automated 0.2 0.0 - 0.9 %    Lymphocytes % 6.4 13.0 - 44.0 %    Monocytes % 3.7 2.0 - 10.0 %    Eosinophils % 0.0 0.0 - 6.0 %    Basophils % 0.0 0.0 - 2.0 %    Neutrophils Absolute 3.66 1.60 - 5.50 x10*3/uL    Immature Granulocytes Absolute, Automated 0.01 0.00 - 0.50 x10*3/uL    Lymphocytes Absolute 0.26 (L) 0.80 - 3.00 x10*3/uL    Monocytes Absolute 0.15 0.05 - 0.80 x10*3/uL    Eosinophils Absolute 0.00 0.00 - 0.40 x10*3/uL    Basophils Absolute 0.00 0.00 - 0.10 x10*3/uL   Basic Metabolic Panel   Result Value Ref Range    Glucose 168 (H) 74 - 99 mg/dL    Sodium 133 (L) 136 - 145 mmol/L    Potassium 4.3 3.5 - 5.3 mmol/L    Chloride 99 98 - 107 mmol/L    Bicarbonate 23 21 - 32 mmol/L    Anion Gap 15 10 - 20 mmol/L    Urea Nitrogen 15 6 - 23 mg/dL    Creatinine 0.68 0.50 - 1.05 mg/dL    eGFR 90 >60 mL/min/1.73m*2    Calcium 8.4 (L) 8.6 - 10.3 mg/dL      Electrocardiogram, 12-lead PRN ACS symptoms  Result Date: 9/13/2024  Sinus tachycardia Right axis deviation Pulmonary disease pattern Right ventricular hypertrophy T wave abnormality, consider inferior ischemia Abnormal ECG When compared with ECG of 28-MAY-2020 21:11, Premature ventricular complexes are no longer Present QRS axis Shifted right T wave inversion more evident  in Inferior leads Nonspecific T wave abnormality now evident in Anterolateral leads    CT angio chest for pulmonary embolism  Result Date: 9/12/2024  STUDY: CT Angiogram of the Chest; 9/12/2024 4:22 PM INDICATION: Evaluate for pulmonary embolism. COMPARISON: XR chest 9/12/2024. ACCESSION NUMBER(S): [id number] ORDERING CLINICIAN: ELICIA VEGA TECHNIQUE:  CTA of the chest was performed with intravenous contrast. 75 Ml of omni 350.   Images are reviewed and processed at a workstation according to the CT angiogram protocol with 3-D and/or MIP post processing imaging generated. Automated mA/kV exposure control was utilized and patient examination was performed in strict accordance with principles of ALARA. FINDINGS: Pulmonary arteries are adequately opacified without acute or chronic filling defects.  The thoracic aorta is normal in course and caliber without dissection or aneurysm. The heart is normal in size without pericardial effusion.  Thoracic lymph nodes are not enlarged.  There is a moderately large retrocardiac hiatal hernia. There is no pleural effusion, pleural thickening, or pneumothorax. The airways are patent. There is significant central lobar emphysematous change throughout the lungs most severe in the upper lobes but no definite consolidation or lung nodules are appreciated.  There is some small linear scarring or discoid atelectasis in the posterior left lower lobe millimeters subpleural noncalcified nodule in the lateral left lung base just above the diaphragm... Upper abdomen demonstrates no acute pathology. There is a 50% wedging of the T8 thoracic vertebral body of uncertain age..  No suspicious bony lesions.    There does not appear to be any evidence of pulmonary embolus or thoracic aortic aneurysm or dissection.   There is very severe central lobar emphysematous change in the lungs with an 8 mm  noncalcified subpleural nodule in the inferior left lung base laterally and some linear scarring  or discoid atelectasis in the posterior left lower lobe but there is no significant alveolar consolidation and no effusion or pneumothorax.. There is moderately large retrocardiac hiatal hernia. Signed by Suhail Manjarrez MD    XR chest 2 views  Result Date: 9/12/2024  STUDY: Chest Radiographs;  9/12/2024 14:55 INDICATION: Shortness of breath. COMPARISON: 9/9/2024 XR Chest ACCESSION NUMBER(S): [id number] ORDERING CLINICIAN: ELICIA VEGA TECHNIQUE:  Frontal and lateral chest. FINDINGS: The lungs are hyperinflated.  There are bibasilar opacities, indicative of atelectasis or pneumonia.  Heart size is within normal limits.  There is no evidence of a pneumothorax.    1.  Bibasilar opacities, indicative of atelectasis or pneumonia. 2.  COPD. Signed by Tomas Ugarte MD    XR chest 2 views  Result Date: 9/10/2024  Interpreted By:  Kyle Mcnamara, STUDY: XR CHEST 2 VIEWS;  9/9/2024 2:10 pm   INDICATION: Signs/Symptoms:sob.   COMPARISON: Chest radiograph 05/28/2020   ACCESSION NUMBER(S): [id number]   ORDERING CLINICIAN: HAYDE HUGGINS   FINDINGS:     CARDIOMEDIASTINAL SILHOUETTE: Cardiomediastinal silhouette is stable in size and configuration.   LUNGS: Bibasilar mixed reticular and airspace opacities involving the predominately lower lobes bilaterally. No pneumothorax or effusion.   ABDOMEN: No remarkable upper abdominal findings.   BONES: No acute osseous changes.   Remaining findings appear stable since prior comparison radiograph.       1.  Bilateral lower lobe mixed reticular and airspace opacities.     Signed by: Kyle Mcnamara 9/10/2024 5:48 PM Dictation workstation:   [id number]      ECHO 12/27/2023    CONCLUSIONS:   1. Left ventricular systolic function is hyperdynamic with a 70-75% estimated ejection fraction.   2. Spectral Doppler shows an impaired relaxation pattern of left ventricular diastolic filling.   3. Moderately elevated right ventricular systolic pressure (66.4 mmHg).   4. Aortic valve appears  abnormal.   5. Moderate aortic valve stenosis.    [id number] Oj Camp MD  Electronically signed on 12/27/2023 at 3:10:52 PM       Assessment/Plan     Humera Villegas is a 74 y.o. female with PMH of HTN, HLD, COPD (not on O2), MOMO, osteoporosis (on Prolia) presented to [facility] ED from her PCPs office with a concern for hypoxia.  In her PCPs office there was a concern for low pulse ox as well as increased dyspnea with exertion.  Patient reports intermittent episodes of hypoxia with dyspnea on exertion over the past few months.  Denied fever, chills, cough, congestion, nausea, vomiting and diarrhea.  ED room air Pox 62%; placed on nonrebreather.  On exam patient was tachypneic with increased work of breathing and poor air movement.  EKG showing sinus tachycardia without acute ischemia; troponins 30-33.  Labs without leukocytosis, .  Flu A/B, RSV, COVID all negative.  VBG pH 7.43, pCO2 39, HCO3 25.9.  CXR with bibasilar opacities c/w atelectasis vs pneumonia.  CT PE negative for PE with demonstration of significant diffuse emphysematous changes.  She was treated with DuoNebs, Solu-Medrol, magnesium, azithromycin, ceftriaxone and doxycycline.  She was able to be weaned to 6 L NC and admitted for COPD exacerbation, pneumonia and respiratory failure for which pulmonology is consulted.    Impression/recommendations:    #Acute hypoxic respiratory failure: Baseline is room air, saturating in the 60s on room air in the ED likely multifactorial from COPD exacerbation, possible undiagnosed heart failure  -Continue supplemental oxygen, wean for saturation 88 to 95%  -Home oxygen evaluation prior to discharge  -Bronchopulmonary hygiene with I-S and Acapella (not higher than setting #2)    #Emphysema/COPD: + Exacerbation; Gold 3E; PFTs 8/14/2020 with severe airway obstruction with air trapping and substantially reduced diffusing capacity; FEV1/FVC 0.39, FEV1 < 40% predicted, RV/TLC > 159% predicted, DLCO < 28% predicted; Home  medications include Trelegy Ellipta (100-60 2.5-25 mcg), albuterol rescue inhaler  -Continue budesonide, formoterol and scheduled DuoNebs  -Transition to prednisone 40mg oral daily x 4 more doses  -Follow-up with pulmonology after discharge    #Pneumonia, community-acquired: CXR demonstrating bibasilar opacities concerning for atelectasis versus pneumonia; antibiotic therapy started in the ED, patient's been afebrile without leukocytosis  -Continue ceftriaxone for now  -Procalcitonin pending  -Strep and Legionella urine antigens pending  -BPH as above    #Pulmonary nodule: Demonstrated on CT left lateral LL 8 mm, noncalcified   -Will require noncontrasted CT chest in 6 months for surveillance    #? HFpEF: Previous echo 12/2023 with impaired relaxation of LV diastolic filling; BNP on admit 729; no obvious signs of overload  -Consider repeating echo, cardiology consult    #Pulmonary hypertension: Likely group 3, possibly 2; echo 12/2023 with RVSP 66.4 mmHg and normal RV systolic function; unclear fluid status at this time  -Blood pressure management per primary  -Goal is euvolemia  -Patient should follow-up with pulmonary medicine at discharge and will need right heart cath in the future (if not done on this admission)    #Malnutrition: Patient reports increased fatigue with decrease activity and poor appetite with 4 pound weight loss over the last few months; BMI low end of normal at 19.5  -Nutritional consult    DVT prophylaxis with Lovenox    Postdischarge: patient will need to establish care with pulmonology provider for management of COPD, pulmonary hypertension and pulmonary nodule surveillance; please discharge on home Trelegy and albuterol MDI rescue inhaler    Thank you for the consult.  Pulmonology will follow.    I spent 85 minutes in the professional and overall care of this patient.   Barbara Driscoll, FRANCIA-CNS

## 2024-09-13 NOTE — PROGRESS NOTES
09/13/24 0945   Current Planned Discharge Disposition   Current Planned Discharge Disposition Home H     [facility]

## 2024-09-13 NOTE — PROGRESS NOTES
09/13/24 0954   Discharge Planning   Living Arrangements Spouse/significant other   Support Systems Family members  (daughter)   Type of Residence Private residence   Number of Stairs to Enter Residence 0   Number of Stairs Within Residence 12   Expected Discharge Disposition Home H   Does the patient need discharge transport arranged? Yes   RoundTrip coordination needed? Yes   Has discharge transport been arranged? No   Financial Resource Strain   How hard is it for you to pay for the very basics like food, housing, medical care, and heating? Not hard   Housing Stability   In the last 12 months, was there a time when you were not able to pay the mortgage or rent on time? N   At any time in the past 12 months, were you homeless or living in a shelter (including now)? N   Transportation Needs   In the past 12 months, has lack of transportation kept you from medical appointments or from getting medications? no   In the past 12 months, has lack of transportation kept you from meetings, work, or from getting things needed for daily living? No     TCC met with pt at bedside. Lives alone daughter lives close by. Pr seen on 4L n/c none at baseline. Admitted for PN and COPD. Wll dc with [facility] if needed. Adod 2-3 days.

## 2024-09-14 ENCOUNTER — APPOINTMENT (OUTPATIENT)
Dept: CARDIOLOGY | Facility: HOSPITAL | Age: 74
DRG: 189 | End: 2024-09-14
Payer: MEDICARE

## 2024-09-14 LAB
ANION GAP SERPL CALC-SCNC: 11 MMOL/L (ref 10–20)
AORTIC VALVE MEAN GRADIENT: 35 MMHG
AORTIC VALVE PEAK VELOCITY: 4.01 M/S
ATRIAL RATE: 103 BPM
AV PEAK GRADIENT: 64.3 MMHG
AVA (PEAK VEL): 1.3 CM2
AVA (VTI): 1.29 CM2
BASOPHILS # BLD AUTO: 0.01 X10*3/UL (ref 0–0.1)
BASOPHILS NFR BLD AUTO: 0.1 %
BUN SERPL-MCNC: 17 MG/DL (ref 6–23)
CALCIUM SERPL-MCNC: 8.6 MG/DL (ref 8.6–10.3)
CHLORIDE SERPL-SCNC: 100 MMOL/L (ref 98–107)
CO2 SERPL-SCNC: 26 MMOL/L (ref 21–32)
CREAT SERPL-MCNC: 0.58 MG/DL (ref 0.5–1.05)
EGFRCR SERPLBLD CKD-EPI 2021: >90 ML/MIN/1.73M*2
EJECTION FRACTION APICAL 4 CHAMBER: 62.2
EJECTION FRACTION: 80 %
EOSINOPHIL # BLD AUTO: 0 X10*3/UL (ref 0–0.4)
EOSINOPHIL NFR BLD AUTO: 0 %
ERYTHROCYTE [DISTWIDTH] IN BLOOD BY AUTOMATED COUNT: 15.5 % (ref 11.5–14.5)
GLUCOSE SERPL-MCNC: 102 MG/DL (ref 74–99)
HCT VFR BLD AUTO: 51.4 % (ref 36–46)
HGB BLD-MCNC: 16.1 G/DL (ref 12–16)
IMM GRANULOCYTES # BLD AUTO: 0.04 X10*3/UL (ref 0–0.5)
IMM GRANULOCYTES NFR BLD AUTO: 0.4 % (ref 0–0.9)
LEFT ATRIUM VOLUME AREA LENGTH INDEX BSA: 25.4 ML/M2
LEFT VENTRICLE INTERNAL DIMENSION DIASTOLE: 2.74 CM (ref 3.5–6)
LEFT VENTRICULAR OUTFLOW TRACT DIAMETER: 2.21 CM
LEGIONELLA AG UR QL: NEGATIVE
LYMPHOCYTES # BLD AUTO: 0.35 X10*3/UL (ref 0.8–3)
LYMPHOCYTES NFR BLD AUTO: 3.9 %
MCH RBC QN AUTO: 31 PG (ref 26–34)
MCHC RBC AUTO-ENTMCNC: 31.3 G/DL (ref 32–36)
MCV RBC AUTO: 99 FL (ref 80–100)
MITRAL VALVE E/A RATIO: 1.18
MONOCYTES # BLD AUTO: 0.27 X10*3/UL (ref 0.05–0.8)
MONOCYTES NFR BLD AUTO: 3 %
NEUTROPHILS # BLD AUTO: 8.39 X10*3/UL (ref 1.6–5.5)
NEUTROPHILS NFR BLD AUTO: 92.6 %
NRBC BLD-RTO: 0 /100 WBCS (ref 0–0)
P AXIS: 76 DEGREES
P OFFSET: 202 MS
P ONSET: 153 MS
PLATELET # BLD AUTO: 211 X10*3/UL (ref 150–450)
POTASSIUM SERPL-SCNC: 4.6 MMOL/L (ref 3.5–5.3)
PR INTERVAL: 136 MS
PROCALCITONIN SERPL-MCNC: 0.02 NG/ML
Q ONSET: 221 MS
QRS COUNT: 17 BEATS
QRS DURATION: 72 MS
QT INTERVAL: 324 MS
QTC CALCULATION(BAZETT): 424 MS
QTC FREDERICIA: 388 MS
R AXIS: 156 DEGREES
RBC # BLD AUTO: 5.2 X10*6/UL (ref 4–5.2)
RIGHT VENTRICLE FREE WALL PEAK S': 12 CM/S
RIGHT VENTRICLE PEAK SYSTOLIC PRESSURE: 89 MMHG
S PNEUM AG UR QL: NEGATIVE
SODIUM SERPL-SCNC: 132 MMOL/L (ref 136–145)
T AXIS: -38 DEGREES
T OFFSET: 383 MS
TRICUSPID ANNULAR PLANE SYSTOLIC EXCURSION: 1.8 CM
VENTRICULAR RATE: 103 BPM
WBC # BLD AUTO: 9.1 X10*3/UL (ref 4.4–11.3)

## 2024-09-14 PROCEDURE — 2500000005 HC RX 250 GENERAL PHARMACY W/O HCPCS: Performed by: INTERNAL MEDICINE

## 2024-09-14 PROCEDURE — 82374 ASSAY BLOOD CARBON DIOXIDE: CPT | Performed by: INTERNAL MEDICINE

## 2024-09-14 PROCEDURE — 99222 1ST HOSP IP/OBS MODERATE 55: CPT | Performed by: INTERNAL MEDICINE

## 2024-09-14 PROCEDURE — 2500000004 HC RX 250 GENERAL PHARMACY W/ HCPCS (ALT 636 FOR OP/ED): Performed by: INTERNAL MEDICINE

## 2024-09-14 PROCEDURE — 94667 MNPJ CHEST WALL 1ST: CPT

## 2024-09-14 PROCEDURE — 36415 COLL VENOUS BLD VENIPUNCTURE: CPT | Performed by: INTERNAL MEDICINE

## 2024-09-14 PROCEDURE — 9420000001 HC RT PATIENT EDUCATION 5 MIN

## 2024-09-14 PROCEDURE — 94668 MNPJ CHEST WALL SBSQ: CPT

## 2024-09-14 PROCEDURE — 99233 SBSQ HOSP IP/OBS HIGH 50: CPT | Performed by: INTERNAL MEDICINE

## 2024-09-14 PROCEDURE — 93306 TTE W/DOPPLER COMPLETE: CPT | Performed by: INTERNAL MEDICINE

## 2024-09-14 PROCEDURE — 93306 TTE W/DOPPLER COMPLETE: CPT

## 2024-09-14 PROCEDURE — 1200000002 HC GENERAL ROOM WITH TELEMETRY DAILY

## 2024-09-14 PROCEDURE — 85025 COMPLETE CBC W/AUTO DIFF WBC: CPT | Performed by: INTERNAL MEDICINE

## 2024-09-14 PROCEDURE — 2500000002 HC RX 250 W HCPCS SELF ADMINISTERED DRUGS (ALT 637 FOR MEDICARE OP, ALT 636 FOR OP/ED): Performed by: INTERNAL MEDICINE

## 2024-09-14 PROCEDURE — 2500000001 HC RX 250 WO HCPCS SELF ADMINISTERED DRUGS (ALT 637 FOR MEDICARE OP): Performed by: INTERNAL MEDICINE

## 2024-09-14 PROCEDURE — 2500000002 HC RX 250 W HCPCS SELF ADMINISTERED DRUGS (ALT 637 FOR MEDICARE OP, ALT 636 FOR OP/ED): Performed by: PHARMACIST

## 2024-09-14 PROCEDURE — 94640 AIRWAY INHALATION TREATMENT: CPT

## 2024-09-14 RX ORDER — PANTOPRAZOLE SODIUM 40 MG/1
40 TABLET, DELAYED RELEASE ORAL
Status: DISCONTINUED | OUTPATIENT
Start: 2024-09-14 | End: 2024-09-17 | Stop reason: HOSPADM

## 2024-09-14 ASSESSMENT — COGNITIVE AND FUNCTIONAL STATUS - GENERAL
MOBILITY SCORE: 19
MOVING TO AND FROM BED TO CHAIR: A LITTLE
MOBILITY SCORE: 19
CLIMB 3 TO 5 STEPS WITH RAILING: A LITTLE
MOVING FROM LYING ON BACK TO SITTING ON SIDE OF FLAT BED WITH BEDRAILS: A LITTLE
MOVING TO AND FROM BED TO CHAIR: A LITTLE
HELP NEEDED FOR BATHING: A LITTLE
MOVING FROM LYING ON BACK TO SITTING ON SIDE OF FLAT BED WITH BEDRAILS: A LITTLE
DRESSING REGULAR LOWER BODY CLOTHING: A LITTLE
HELP NEEDED FOR BATHING: A LITTLE
DAILY ACTIVITIY SCORE: 21
TURNING FROM BACK TO SIDE WHILE IN FLAT BAD: A LITTLE
WALKING IN HOSPITAL ROOM: A LITTLE
WALKING IN HOSPITAL ROOM: A LITTLE
CLIMB 3 TO 5 STEPS WITH RAILING: A LITTLE
TOILETING: A LITTLE
TURNING FROM BACK TO SIDE WHILE IN FLAT BAD: A LITTLE
DAILY ACTIVITIY SCORE: 21
DRESSING REGULAR LOWER BODY CLOTHING: A LITTLE
TOILETING: A LITTLE

## 2024-09-14 ASSESSMENT — PAIN - FUNCTIONAL ASSESSMENT
PAIN_FUNCTIONAL_ASSESSMENT: 0-10
PAIN_FUNCTIONAL_ASSESSMENT: 0-10

## 2024-09-14 ASSESSMENT — PAIN SCALES - GENERAL
PAINLEVEL_OUTOF10: 0 - NO PAIN

## 2024-09-14 NOTE — CONSULTS
Inpatient consult to Cardiology  Consult performed by: TYRON Miller  Consult ordered by: TYRON Lucas  Reason for consult: Eval for right heart strain.      History Of Present Illness:    Humera Villegas is a 74 y.o. female with past medical history significant for Moderate aortic stenosis, Hypertension, COPD, Hyperlipidemia, Generalized anxiety disorder and osteoporosis.  Presented with shortness of breath. Cardiology is consulted for eval for right heart strain.     Patient reports she has been struggling with shortness of breath for the last several months.  States the last 2 weeks she noticed worsening dyspnea on exertion. Denies any chest pain, orthopnea, PND, lower extremity edema, dizziness.  She went to see her PCP and was noted to have low pulse ox and was subsequently referred to the ED for further evaluation.       EKG showed tachycardia  pulmonary disease pattern  no acute ischemic changes.  Chest x-ray showed bilateral opacities consistent with atelectasis versus pneumonia.  CT PE was negative for pulmonary embolism, thoracic aneurysm, dissection, pericardial effusion, pleural effusion, pleural thickening or pneumothorax. No mention of RV strain.  Did show very severe central lobar emphysematous change in the lung and pulmonary nodules. Airways were patent.    High-sensitivity troponin 30/33   K 4.0 BUN 15 creatinine 0.80 T-bili 1.4  (previously 54) WBC 5.8 hemoglobin 18.1 hematocrit 56.2 platelets 227   VBG pH 7.43 pCO2 39 bicarb 25.9 lactate 1.8.  She was hypoxic on presentation with pulse ox 62-78%.  She was treated with breathing treatment, IV solumedrol , IV antibiotics.  Seen by pulmonary who recommended transitioning to prednisone.      Of note, patient does not follow with any cardiologist.  Echocardiogram done December 2023 showed LVEF 70-75% impaired relaxation  moderate elevated RV systolic pressure with RVSP 66.4 mmHg. Right ventricle was  "normal size with normal RV function.       Home CV meds  Amlodipine 2.5 mg daily  Atorvastatin 20 mg daily       Last Recorded Vitals:  Vitals:    09/13/24 2100 09/14/24 0500 09/14/24 0808 09/14/24 0812   BP: 128/57 129/65  121/70   BP Location: Right arm      Patient Position: Sitting      Pulse: 98 88  82   Resp: 18 17  18   Temp: 36.5 °C (97.7 °F) 36.8 °C (98.2 °F)     TempSrc: Oral Temporal     SpO2: 95% 93% 93% 93%   Weight:       Height:           Last Labs:  LABS:  CMP:  Results from last 7 days   Lab Units 09/14/24  0717 09/13/24  1303 09/13/24  0658 09/12/24  1413 09/09/24  1339   SODIUM mmol/L 132* 128* 133* 131*  --    POTASSIUM mmol/L 4.6 4.4 4.3 4.0  --    CHLORIDE mmol/L 100 100 99 97*  --    CO2 mmol/L 26 18* 23 23  --    ANION GAP mmol/L 11 14 15 15  --    BUN mg/dL 17 16 15 15  --    CREATININE mg/dL 0.58 0.60 0.68 0.80  --    EGFR mL/min/1.73m*2 >90 >90 90 76  --    ALBUMIN g/dL  --   --   --  4.1 4.0   ALT U/L  --   --   --  54* 71*   AST U/L  --   --   --  33 37   BILIRUBIN TOTAL mg/dL  --   --   --  1.4* 1.4*     CBC:  Results from last 7 days   Lab Units 09/14/24  0907 09/13/24  1429 09/13/24  0658 09/12/24  1413 09/09/24  1339   WBC AUTO x10*3/uL 9.1 8.1 4.1* 5.8 5.1   HEMOGLOBIN g/dL 16.1* 16.1* 16.2* 18.1* 18.4*   HEMATOCRIT % 51.4* 51.0* 51.4* 56.2* 57.5*   PLATELETS AUTO x10*3/uL 211 223 197 227 219   MCV fL 99 99 98 97 98     COAG:     ABO: No results found for: \"ABO\"  HEME/ENDO:     CARDIAC:   Results from last 7 days   Lab Units 09/12/24  1637 09/12/24  1413   TROPHS ng/L 33* 30*   BNP pg/mL  --  729*     Recent Labs     09/03/24  1201 12/04/23  1351 05/25/23  1207 11/11/22  1119 05/19/22  1142   CHOL 139 193 184 196 179   LDLF  --   --  90 103* 88   LDLCALC 64 99  --   --   --    HDL 56.5 78.9 75.1 77.3 74.2   TRIG 95 78 96 79 84     Imagine Results  CT angio chest for pulmonary embolism   Final Result   There does not appear to be any evidence of pulmonary embolus or   thoracic aortic " aneurysm or dissection.     There is very severe central lobar emphysematous change in the lungs   with an 8 mm  noncalcified subpleural nodule in the inferior left lung   base laterally and some linear scarring or discoid atelectasis in the   posterior left lower lobe but there is no significant alveolar   consolidation and no effusion or pneumothorax..   There is moderately large retrocardiac hiatal hernia.   Signed by Suhail Manjarrez MD      XR chest 2 views   Final Result   1.  Bibasilar opacities, indicative of atelectasis or pneumonia.   2.  COPD.   Signed by Tomas Ugarte MD      Transthoracic Echo (TTE) Complete    (Results Pending)   Transthoracic Echo (TTE) Complete    (Results Pending)         Last I/O:  I/O last 3 completed shifts:  In: 100 (2.1 mL/kg) [I.V.:50 (1 mL/kg); IV Piggyback:50]  Out: 500 (10.3 mL/kg) [Urine:500 (0.3 mL/kg/hr)]  Weight: 48.4 kg     Past Cardiology Tests (Last 3 Years):  EKG:  Electrocardiogram, 12-lead PRN ACS symptoms 09/12/2024 (Preliminary)    Echo:  Transthoracic Echo (TTE) Complete 12/27/2023   1. Left ventricular systolic function is hyperdynamic with a 70-75% estimated ejection fraction.   2. Spectral Doppler shows an impaired relaxation pattern of left ventricular diastolic filling.   3. Moderately elevated right ventricular systolic pressure.   4. Aortic valve appears abnormal.   5. Moderate aortic valve stenosis.      Ejection Fractions:  EF   Date/Time Value Ref Range Status   12/27/2023 02:46 PM 70       Cath:  No results found for this or any previous visit from the past 1095 days.    Stress Test:  No results found for this or any previous visit from the past 1095 days.    Cardiac Imaging:  No results found for this or any previous visit from the past 1095 days.      Past Medical History:  She has a past medical history of Compression fracture of thoracic vertebra (Multi) (06/04/2012), Encounter for screening for malignant neoplasm of colon, Encounter for screening  for malignant neoplasm of vagina, Other conditions influencing health status, Pain in thoracic spine (06/04/2012), Personal history of other diseases of the digestive system, Personal history of other endocrine, nutritional and metabolic disease, Personal history of other medical treatment, Strain of muscle and tendon of back wall of thorax, initial encounter, and Wedge compression fracture of unspecified thoracic vertebra, initial encounter for closed fracture (Multi).    Past Surgical History:  She has a past surgical history that includes Other surgical history (10/30/2014).      Social History:  She reports that she has never smoked. She has never been exposed to tobacco smoke. She has never used smokeless tobacco. She reports that she does not drink alcohol and does not use drugs.    Family History:  Family History   Problem Relation Name Age of Onset    Other (cardiac disorder) Mother      Diabetes Mother      Other (cardiac disorder) Father          Allergies:  Amoxicillin and Penicillin    Inpatient Medications:  Scheduled medications   Medication Dose Route Frequency    amLODIPine  2.5 mg oral Daily    atorvastatin  20 mg oral Daily    budesonide  0.5 mg nebulization BID    cefTRIAXone  2 g intravenous q24h    enoxaparin  40 mg subcutaneous q24h JOSEPH    formoterol  20 mcg nebulization BID    gabapentin  100 mg oral TID    ipratropium-albuteroL  3 mL nebulization TID    methylPREDNISolone sodium succinate (PF)  40 mg intravenous q12h    oxygen   inhalation Continuous - 02/gases    pantoprazole  40 mg oral Daily before breakfast     PRN medications   Medication    ALPRAZolam    alum-mag hydroxide-simeth    ipratropium-albuteroL    oxygen     Continuous Medications   Medication Dose Last Rate     Outpatient Medications:  Current Outpatient Medications   Medication Instructions    ALPRAZolam (XANAX) 0.5 mg, oral, 3 times daily PRN    amLODIPine (NORVASC) 2.5 mg, oral, Daily    atorvastatin (LIPITOR) 20 mg, oral,  Daily    cholecalciferol (VITAMIN D3) 1,000 Units, oral, Daily    cyanocobalamin (VITAMIN B-12) 1,000 mcg, oral, Daily    fluticasone (Flonase) 50 mcg/actuation nasal spray 2 sprays, Each Nostril, Daily, Shake gently. Before first use, prime pump. After use, clean tip and replace cap.    fluticasone-umeclidin-vilanter (Trelegy Ellipta) 100-62.5-25 mcg blister with device 1 puff, inhalation, Daily    gabapentin (NEURONTIN) 100 mg, oral, 3 times daily    meloxicam (MOBIC) 15 mg, oral, Daily    methocarbamol (Robaxin) 500 mg tablet Take 1-2 tabs every 8 hours as needed for spasms    omeprazole (PRILOSEC) 20 mg, oral, Daily    Prolia 60 mg, subcutaneous, Every 6 months       Physical Exam:  GENERAL: alert, cooperative, pleasant, in no acute distress  SKIN: warm, dry  NECK: no JVD  CARDIAC: Regular rate and rhythm no murmurs  PULMONARY: Normal respiratory efforts, lungs clear to auscultation bilaterally.  ABDOMEN: soft, nondistended  EXTREMITIES: no lower extremity edema  NEURO: Alert and oriented x 3.  Grossly normal.  Moves all 4 extremities.     I reviewed telemetry which SR PVCs no events      Assessment/Plan   Humera Villegas is a 74 y.o. female with past medical history significant for Moderate aortic stenosis, Hypertension, COPD, Hyperlipidemia, Generalized anxiety disorder and osteoporosis.  Presented with shortness of breath. Cardiology is consulted for eval for right heart strain.     Acute hypoxic respiratory failure  In setting of severe emphysema/COPD.  Pulmonary following.  She is euvolemic on clinical exam. Echocardiogram 12/2023 showed moderate elevated RV systolic pressure with RVSP 66.4 mmHg.  This is in the setting of known COPD.  No indication for right heart cath at this time.      2. Moderate aortic stenosis   On TTE 12/2023  Repeat echo pending   Will need outpatient survillance     3. HTN  BP acceptable  On low dose amlodipine 2.5 mg daily    4. HLD  On outpatient statin   Last LDL  64    Recommendations  Repeat echo is pending  Symptoms secondary to COPD  No indication for inpatient RHC at this time     Code Status:  Full Code    Mathew Jade, APRN-CNP

## 2024-09-14 NOTE — CARE PLAN
Problem: Fall/Injury  Goal: Not fall by end of shift  Outcome: Progressing  Goal: Be free from injury by end of the shift  Outcome: Progressing  Goal: Verbalize understanding of personal risk factors for fall in the hospital  Outcome: Progressing  Goal: Verbalize understanding of risk factor reduction measures to prevent injury from fall in the home  Outcome: Progressing  Goal: Use assistive devices by end of the shift  Outcome: Progressing  Goal: Pace activities to prevent fatigue by end of the shift  Outcome: Progressing     Problem: Pain - Adult  Goal: Verbalizes/displays adequate comfort level or baseline comfort level  Outcome: Progressing     Problem: Discharge Planning  Goal: Discharge to home or other facility with appropriate resources  Outcome: Progressing     Problem: Safety - Adult  Goal: Free from fall injury  Outcome: Progressing     Problem: Chronic Conditions and Co-morbidities  Goal: Patient's chronic conditions and co-morbidity symptoms are monitored and maintained or improved  Outcome: Progressing     Problem: Respiratory  Goal: Minimize anxiety/maximize coping throughout shift  Outcome: Progressing  Goal: Minimal/no exertional discomfort or dyspnea this shift  Outcome: Progressing  Goal: No signs of respiratory distress (eg. Use of accessory muscles. Peds grunting)  Outcome: Progressing  Goal: Verbalize decreased shortness of breath this shift  Outcome: Progressing  Goal: Wean oxygen to maintain O2 saturation per order/standard this shift  Outcome: Progressing   The patient's goals for the shift include pt will have decrease sob during shift    The clinical goals for the shift include pt will tolerate activity oob without increase oxygen need

## 2024-09-14 NOTE — PROGRESS NOTES
Humera Villegas is a 74 y.o. female on day 2 of admission presenting with COPD exacerbation (Multi).  Patient with a history of moderate aortic stenosis,HTN, DLP, COPD not on home O2, MOMO, and osteoporosis, who p/w  DOMINGUEZ associated with hypoxia. In the ED was placed on NRB, work up revealed: BNP 700s, bibasilar opacities and diffuse emphysema on chest imaging. Received Solu-Medrol, Mg,  antibiotics and admitted to [facility] for further management. Pulmonary is consulted for COPD, pneumonia and respiratory failure.   Subjective   No acute overnight events. O2 requirements improved to 4L.     Overall is feeling better. SOB improved. Still has some coughing, productive of clear sputum. Wheezing minimal. Denies CP or any other complaints.   Objective   Scheduled medications  amLODIPine, 2.5 mg, oral, Daily  atorvastatin, 20 mg, oral, Daily  budesonide, 0.5 mg, nebulization, BID  cefTRIAXone, 2 g, intravenous, q24h  enoxaparin, 40 mg, subcutaneous, q24h JOSEPH  formoterol, 20 mcg, nebulization, BID  gabapentin, 100 mg, oral, TID  ipratropium-albuteroL, 3 mL, nebulization, TID  methylPREDNISolone sodium succinate (PF), 40 mg, intravenous, q12h  oxygen, , inhalation, Continuous - 02/gases  pantoprazole, 40 mg, oral, Daily before breakfast    Continuous medications     PRN medications  PRN medications: ALPRAZolam, alum-mag hydroxide-simeth, ipratropium-albuteroL, oxygen   Physical Exam  Constitutional:       General: She is not in acute distress.     Appearance: She is normal weight. She is not ill-appearing or toxic-appearing.   HENT:      Head: Normocephalic and atraumatic.      Nose:      Comments: On 4L NC     Mouth/Throat:      Mouth: Mucous membranes are moist.      Comments: Mallampati 3.   Eyes:      General: No scleral icterus.     Extraocular Movements: Extraocular movements intact.      Pupils: Pupils are equal, round, and reactive to light.   Cardiovascular:      Rate and Rhythm: Normal rate and regular rhythm.      Heart  "sounds: No murmur heard.     No friction rub. No gallop.   Pulmonary:      Effort: No respiratory distress.      Breath sounds: No wheezing or rales.   Abdominal:      General: Bowel sounds are normal. There is no distension.      Palpations: Abdomen is soft.      Tenderness: There is no abdominal tenderness.   Musculoskeletal:         General: No tenderness. Normal range of motion.      Cervical back: Normal range of motion and neck supple. No rigidity or tenderness.      Right lower leg: No edema.      Left lower leg: No edema.   Lymphadenopathy:      Cervical: No cervical adenopathy.   Skin:     General: Skin is warm and dry.      Coloration: Skin is not jaundiced.   Neurological:      General: No focal deficit present.      Mental Status: She is alert and oriented to person, place, and time.      Cranial Nerves: No cranial nerve deficit.      Motor: No weakness.   Psychiatric:         Mood and Affect: Mood normal.         Behavior: Behavior normal.   Last Recorded Vitals  Blood pressure 121/70, pulse 82, temperature 36.8 °C (98.2 °F), temperature source Temporal, resp. rate 18, height 1.575 m (5' 2\"), weight 48.4 kg (106 lb 9.6 oz), SpO2 93%.  Intake/Output last 3 Shifts:  I/O last 3 completed shifts:  In: 100 (2.1 mL/kg) [I.V.:50 (1 mL/kg); IV Piggyback:50]  Out: 500 (10.3 mL/kg) [Urine:500 (0.3 mL/kg/hr)]  Weight: 48.4 kg     Relevant Results  Results for orders placed or performed during the hospital encounter of 09/12/24 (from the past 24 hour(s))   Basic Metabolic Panel   Result Value Ref Range    Glucose 102 (H) 74 - 99 mg/dL    Sodium 132 (L) 136 - 145 mmol/L    Potassium 4.6 3.5 - 5.3 mmol/L    Chloride 100 98 - 107 mmol/L    Bicarbonate 26 21 - 32 mmol/L    Anion Gap 11 10 - 20 mmol/L    Urea Nitrogen 17 6 - 23 mg/dL    Creatinine 0.58 0.50 - 1.05 mg/dL    eGFR >90 >60 mL/min/1.73m*2    Calcium 8.6 8.6 - 10.3 mg/dL   CBC and Auto Differential   Result Value Ref Range    WBC 9.1 4.4 - 11.3 x10*3/uL    " nRBC 0.0 0.0 - 0.0 /100 WBCs    RBC 5.20 4.00 - 5.20 x10*6/uL    Hemoglobin 16.1 (H) 12.0 - 16.0 g/dL    Hematocrit 51.4 (H) 36.0 - 46.0 %    MCV 99 80 - 100 fL    MCH 31.0 26.0 - 34.0 pg    MCHC 31.3 (L) 32.0 - 36.0 g/dL    RDW 15.5 (H) 11.5 - 14.5 %    Platelets 211 150 - 450 x10*3/uL    Neutrophils % 92.6 40.0 - 80.0 %    Immature Granulocytes %, Automated 0.4 0.0 - 0.9 %    Lymphocytes % 3.9 13.0 - 44.0 %    Monocytes % 3.0 2.0 - 10.0 %    Eosinophils % 0.0 0.0 - 6.0 %    Basophils % 0.1 0.0 - 2.0 %    Neutrophils Absolute 8.39 (H) 1.60 - 5.50 x10*3/uL    Immature Granulocytes Absolute, Automated 0.04 0.00 - 0.50 x10*3/uL    Lymphocytes Absolute 0.35 (L) 0.80 - 3.00 x10*3/uL    Monocytes Absolute 0.27 0.05 - 0.80 x10*3/uL    Eosinophils Absolute 0.00 0.00 - 0.40 x10*3/uL    Basophils Absolute 0.01 0.00 - 0.10 x10*3/uL   Transthoracic Echo (TTE) Complete   Result Value Ref Range    AV pk micaela 4.01 m/s    AV mn grad 35.0 mmHg    LVOT diam 2.21 cm    MV E/A ratio 1.18     LA vol index A/L 25.4 ml/m2    Tricuspid annular plane systolic excursion 1.8 cm    LV EF 80 %    RV free wall pk S' 12.00 cm/s    LVIDd 2.74 cm    RVSP 89.0 mmHg    Aortic Valve Area by Continuity of VTI 1.29 cm2    Aortic Valve Area by Continuity of Peak Velocity 1.30 cm2    AV pk grad 64.3 mmHg    LV A4C EF 62.2    Transthoracic Echo (TTE) Complete    Result Date: 9/14/2024   [facility], [address] [place] [address]              Tel [phone] and Fax [phone] TRANSTHORACIC ECHOCARDIOGRAM REPORT  Patient Name:      SUSANNA Johnson Physician:    [id number] Micheal Arroyo MD Study Date:        9/14/2024            Ordering Provider:    [id number] NAFISA RUIZ MRN/PID:           [id number]             Fellow: Accession#:        [id number]         Nurse: Date of Birth/Age: 1950 / 74 years  Sonographer:          Ash Cardoso RDCS Gender:            F                    Additional Staff: Height:            157.00 cm            Admit Date: Weight:            48.90 kg             Admission Status:     Inpatient -                                                               Routine BSA / BMI:         1.47 m2 / 19.84      Encounter#:           [id number]                    kg/m2 Blood Pressure:    121/70 mmHg          Department Location:  [facility] Non                                                               Invasive Study Type:    TRANSTHORACIC ECHO (TTE) COMPLETE Diagnosis/ICD: Essential (primary) hypertension-I10 Indication:    hypertension CPT Code:      Echo Complete w Full [id number] Patient History: Pertinent History: HTN. Study Detail: The following Echo studies were performed: 2D, M-Mode, Doppler and               color flow.  PHYSICIAN INTERPRETATION: Left Ventricle: Left ventricular ejection fraction is hyperdynamic, by visual estimate at 80%. There are no regional left ventricular wall motion abnormalities. The left ventricular cavity size is decreased. There is left ventricular concentric remodeling. Left ventricular diastolic filling was indeterminate. Left Atrium: The left atrium is normal in size. Right Ventricle: The right ventricle is moderately enlarged. There is normal right ventricular global systolic function. Right Atrium: The right atrium is mildly dilated. Aortic Valve: The aortic valve is trileaflet. 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 There is moderate to severe aortic valve cusp calcification. There is evidence of moderate to severe aortic valve stenosis. The aortic valve dimensionless index is 0.33. There is no evidence of aortic valve regurgitation. The peak instantaneous gradient of the aortic valve is 64.3 mmHg. The mean gradient of the aortic valve is 35.0  mmHg. Mitral Valve: The mitral valve is normal in structure. There is moderate mitral annular calcification. There is trace mitral valve regurgitation. Tricuspid Valve: The tricuspid valve is structurally normal. There is mild tricuspid regurgitation. The Doppler estimated RVSP is severely elevated at 89.0 mmHg. Pulmonic Valve: The pulmonic valve is structurally normal. There is no indication of pulmonic valve regurgitation. Pericardium: There is no pericardial effusion noted. Aorta: The aortic root is normal. Systemic Veins: The inferior vena cava appears to be of normal size, IVC inspiratory collapse greater than 50%. In comparison to the previous echocardiogram(s): Compared with study dated 12/27/2023, the degree of aortic stenosis has worsened. There is a higher estimated right ventricular systolic pressure.  CONCLUSIONS:  1. Left ventricular ejection fraction is hyperdynamic, by visual estimate at 80%.  2. Left ventricular diastolic filling was indeterminate.  3. Left ventricular cavity size is decreased.  4. There is normal right ventricular global systolic function.  5. Moderately enlarged right ventricle.  6. There is moderate mitral annular calcification.  7. Severely elevated right ventricular systolic pressure.  8. Moderate to severe aortic valve stenosis.  9. There is moderate to severe aortic valve cusp calcification. QUANTITATIVE DATA SUMMARY:  2D MEASUREMENTS:          Normal Ranges: LAs:             3.16 cm  (2.7-4.0cm) IVSd:            1.28 cm  (0.6-1.1cm) LVPWd:           1.17 cm  (0.6-1.1cm) LVIDd:           2.74 cm  (3.9-5.9cm) LVIDs:           1.39 cm LV Mass Index:   68 g/m2 LVEDV Index:     27 ml/m2 LV % FS          49.2 %  LA VOLUME:                    Normal Ranges: LA Vol A4C:        34.7 ml    (22+/-6mL/m2) LA Vol A2C:        38.6 ml LA Vol BP:         37.2 ml LA Vol Index A4C:  23.6 ml/m2 LA Vol Index A2C:  26.3 ml/m2 LA Vol Index BP:   25.4 ml/m2 LA Area A4C:       14.7 cm2 LA Area A2C:        15.8 cm2 LA Major Axis A4C: 5.3 cm LA Major Axis A2C: 5.5 cm LA Volume Index:   25.4 ml/m2 LA Vol A4C:        32.2 ml LA Vol A2C:        38.2 ml LA Vol Index BSA:  24.0 ml/m2  RA VOLUME BY A/L METHOD:            Normal Ranges: RA Vol A4C:              49.2 ml    (8.3-19.5ml) RA Vol Index A4C:        33.6 ml/m2 RA Area A4C:             16.5 cm2 RA Major Axis A4C:       4.7 cm  M-MODE MEASUREMENTS:         Normal Ranges: LAs:                 3.38 cm (2.7-4.0cm)  LV SYSTOLIC FUNCTION BY 2D PLANIMETRY (MOD):                      Normal Ranges: EF-A4C View:    62 % (>=55%) EF-A2C View:    59 % EF-Biplane:     59 % EF-Visual:      80 % LV EF Reported: 80 %  LV DIASTOLIC FUNCTION:            Normal Ranges: MV Peak E:             0.87 m/s   (0.7-1.2 m/s) MV Peak A:             0.73 m/s   (0.42-0.7 m/s) E/A Ratio:             1.18       (1.0-2.2) PulmV Sys Shola:         65.74 cm/s PulmV Parks Shola:        49.33 cm/s PulmV S/D Shola:         1.33 PulmV A Revs Shola:      40.55 cm/s PulmV A Revs Dur:      85.63 msec  MITRAL VALVE:          Normal Ranges: MV Vmax:      1.07 m/s (<=1.3m/s) MV peak P.6 mmHg (<5mmHg) MV mean P.7 mmHg (<2mmHg) MV VTI:       21.59 cm (10-13cm) MV DT:        174 msec (150-240msec)  AORTIC VALVE:                      Normal Ranges: AoV Vmax:                4.01 m/s  (<=1.7m/s) AoV Peak P.3 mmHg (<20mmHg) AoV Mean P.0 mmHg (1.7-11.5mmHg) LVOT Max Shola:            1.35 m/s  (<=1.1m/s) AoV VTI:                 73.24 cm  (18-25cm) LVOT VTI:                24.50 cm LVOT Diameter:           2.21 cm   (1.8-2.4cm) AoV Area, VTI:           1.29 cm2  (2.5-5.5cm2) AoV Area,Vmax:           1.30 cm2  (2.5-4.5cm2) AoV Dimensionless Index: 0.33  RIGHT VENTRICLE: RV Basal 4.50 cm RV Mid   3.70 cm RV Major 6.6 cm TAPSE:   18.0 mm RV s'    0.12 m/s  TRICUSPID VALVE/RVSP:          Normal Ranges: Peak TR Velocity:     4.64 m/s RV Syst Pressure:     89 mmHg  (< 30mmHg)  PULMONIC  VALVE:          Normal Ranges: PV Accel Time:  74 msec  (>120ms) PV Max Shola:     1.3 m/s  (0.6-0.9m/s) PV Max P.0 mmHg  Pulmonary Veins: PulmV A Revs Dur: 85.63 msec PulmV A Revs Shola: 40.55 cm/s PulmV Parks Shola:   49.33 cm/s PulmV S/D Shola:    1.33 PulmV Sys Shola:    65.74 cm/s  AORTA: Asc Ao Diam 3.15 cm  [id number] Micheal Arroyo MD Electronically signed on 2024 at 5:35:52 PM  ** Final **     Assessment/Plan   Assessment & Plan  COPD exacerbation (Multi)    74 YOF with a history of moderate aortic stenosis,HTN, DLP, COPD not on home O2, MOMO, and osteoporosis, who p/w  DOMINGUEZ associated with hypoxia. In the ED was placed on NRB, work up revealed: BNP 700s, bibasilar opacities and diffuse emphysema on chest imaging. Received Solu-Medrol, Mg, antibiotics and admitted to [facility] for further management. Pulmonary is consulted for COPD, pneumonia and respiratory failure.     Respiratory failure: acute with hypoxia, due to below       Continue supplemental O2, wean off as tolerates       BPH with Acapella, IS       Home O2 evaluation before DC       Management of the individual causes as below     COPD/Emphysema: based on PFT from  that showed: FEV1/VC 39%, FEV1 40%, RV/%, GOLD III with significant air trapping. On Trelegy and albuterol at home. Now likely with acute exacerbation.       Continue budesonide, formoterol and Duo-Neb      Continue systemic steroids, would change to Prednisone 40 mg daily x 4 days      Pulmonary FU after DC    Pneumonia: based on CXR that showed bibasilar opacities. However pro-calcitonin WNL       Completed 3 days of Azithromycin       Consider stopping Ceftriaxone    Pulmonary nodule: 8 mm non calcified LLL subpleural nodule.         FU chest CT in 6 months    Pulmonary HTN: based on echo from 2023 that showed RVSP 66, but also HFpEF. Likely group 2 +/- 3.        Supplemental O2       Volume status optimization as per primary team.          DVT prophylaxis: Lovenox    Pulmonary  hector PERRY while in house.    Akosua Croft MD

## 2024-09-14 NOTE — CARE PLAN
The patient's goals for the shift include pt will have decrease sob during shift    The clinical goals for the shift include pt will tolerate activity oob without increase oxygen need      Problem: Fall/Injury  Goal: Not fall by end of shift  Outcome: Progressing     Problem: Pain - Adult  Goal: Verbalizes/displays adequate comfort level or baseline comfort level  Outcome: Progressing     Problem: Safety - Adult  Goal: Free from fall injury  Outcome: Progressing     Problem: Discharge Planning  Goal: Discharge to home or other facility with appropriate resources  Outcome: Progressing     Problem: Respiratory  Goal: Minimize anxiety/maximize coping throughout shift  Reactivated  Goal: Minimal/no exertional discomfort or dyspnea this shift  Reactivated  Goal: No signs of respiratory distress (eg. Use of accessory muscles. Peds grunting)  Reactivated  Goal: Verbalize decreased shortness of breath this shift  Reactivated  Goal: Wean oxygen to maintain O2 saturation per order/standard this shift  Reactivated

## 2024-09-15 VITALS
TEMPERATURE: 97.9 F | SYSTOLIC BLOOD PRESSURE: 131 MMHG | RESPIRATION RATE: 18 BRPM | OXYGEN SATURATION: 92 % | HEIGHT: 62 IN | HEART RATE: 111 BPM | DIASTOLIC BLOOD PRESSURE: 53 MMHG | BODY MASS INDEX: 21.62 KG/M2 | WEIGHT: 117.5 LBS

## 2024-09-15 PROBLEM — J96.00 ACUTE RESPIRATORY FAILURE (MULTI): Status: ACTIVE | Noted: 2024-09-15

## 2024-09-15 PROBLEM — I35.0 MODERATE AORTIC STENOSIS: Status: ACTIVE | Noted: 2024-09-15

## 2024-09-15 LAB
ANION GAP SERPL CALC-SCNC: 10 MMOL/L (ref 10–20)
BASOPHILS # BLD AUTO: 0.01 X10*3/UL (ref 0–0.1)
BASOPHILS NFR BLD AUTO: 0.1 %
BUN SERPL-MCNC: 21 MG/DL (ref 6–23)
CALCIUM SERPL-MCNC: 9 MG/DL (ref 8.6–10.3)
CHLORIDE SERPL-SCNC: 101 MMOL/L (ref 98–107)
CO2 SERPL-SCNC: 28 MMOL/L (ref 21–32)
CREAT SERPL-MCNC: 0.61 MG/DL (ref 0.5–1.05)
EGFRCR SERPLBLD CKD-EPI 2021: >90 ML/MIN/1.73M*2
EOSINOPHIL # BLD AUTO: 0 X10*3/UL (ref 0–0.4)
EOSINOPHIL NFR BLD AUTO: 0 %
ERYTHROCYTE [DISTWIDTH] IN BLOOD BY AUTOMATED COUNT: 15.5 % (ref 11.5–14.5)
GLUCOSE SERPL-MCNC: 98 MG/DL (ref 74–99)
HCT VFR BLD AUTO: 50.7 % (ref 36–46)
HGB BLD-MCNC: 15.3 G/DL (ref 12–16)
HOLD SPECIMEN: NORMAL
IMM GRANULOCYTES # BLD AUTO: 0.03 X10*3/UL (ref 0–0.5)
IMM GRANULOCYTES NFR BLD AUTO: 0.4 % (ref 0–0.9)
LYMPHOCYTES # BLD AUTO: 0.41 X10*3/UL (ref 0.8–3)
LYMPHOCYTES NFR BLD AUTO: 5.8 %
MCH RBC QN AUTO: 30.3 PG (ref 26–34)
MCHC RBC AUTO-ENTMCNC: 30.2 G/DL (ref 32–36)
MCV RBC AUTO: 100 FL (ref 80–100)
MONOCYTES # BLD AUTO: 0.43 X10*3/UL (ref 0.05–0.8)
MONOCYTES NFR BLD AUTO: 6.1 %
NEUTROPHILS # BLD AUTO: 6.22 X10*3/UL (ref 1.6–5.5)
NEUTROPHILS NFR BLD AUTO: 87.6 %
NRBC BLD-RTO: 0 /100 WBCS (ref 0–0)
PLATELET # BLD AUTO: 204 X10*3/UL (ref 150–450)
POTASSIUM SERPL-SCNC: 5 MMOL/L (ref 3.5–5.3)
RBC # BLD AUTO: 5.05 X10*6/UL (ref 4–5.2)
SODIUM SERPL-SCNC: 134 MMOL/L (ref 136–145)
WBC # BLD AUTO: 7.1 X10*3/UL (ref 4.4–11.3)

## 2024-09-15 PROCEDURE — 99233 SBSQ HOSP IP/OBS HIGH 50: CPT | Performed by: INTERNAL MEDICINE

## 2024-09-15 PROCEDURE — 94640 AIRWAY INHALATION TREATMENT: CPT

## 2024-09-15 PROCEDURE — 80048 BASIC METABOLIC PNL TOTAL CA: CPT | Performed by: INTERNAL MEDICINE

## 2024-09-15 PROCEDURE — 2500000001 HC RX 250 WO HCPCS SELF ADMINISTERED DRUGS (ALT 637 FOR MEDICARE OP): Performed by: INTERNAL MEDICINE

## 2024-09-15 PROCEDURE — 2500000005 HC RX 250 GENERAL PHARMACY W/O HCPCS: Performed by: INTERNAL MEDICINE

## 2024-09-15 PROCEDURE — 2500000002 HC RX 250 W HCPCS SELF ADMINISTERED DRUGS (ALT 637 FOR MEDICARE OP, ALT 636 FOR OP/ED): Performed by: PHARMACIST

## 2024-09-15 PROCEDURE — 36415 COLL VENOUS BLD VENIPUNCTURE: CPT | Performed by: INTERNAL MEDICINE

## 2024-09-15 PROCEDURE — 9420000001 HC RT PATIENT EDUCATION 5 MIN

## 2024-09-15 PROCEDURE — 2500000004 HC RX 250 GENERAL PHARMACY W/ HCPCS (ALT 636 FOR OP/ED): Performed by: INTERNAL MEDICINE

## 2024-09-15 PROCEDURE — 2500000002 HC RX 250 W HCPCS SELF ADMINISTERED DRUGS (ALT 637 FOR MEDICARE OP, ALT 636 FOR OP/ED): Performed by: INTERNAL MEDICINE

## 2024-09-15 PROCEDURE — 94668 MNPJ CHEST WALL SBSQ: CPT

## 2024-09-15 PROCEDURE — 99232 SBSQ HOSP IP/OBS MODERATE 35: CPT | Performed by: INTERNAL MEDICINE

## 2024-09-15 PROCEDURE — 1200000002 HC GENERAL ROOM WITH TELEMETRY DAILY

## 2024-09-15 PROCEDURE — 85025 COMPLETE CBC W/AUTO DIFF WBC: CPT | Performed by: INTERNAL MEDICINE

## 2024-09-15 ASSESSMENT — COGNITIVE AND FUNCTIONAL STATUS - GENERAL
MOBILITY SCORE: 22
WALKING IN HOSPITAL ROOM: A LITTLE
DAILY ACTIVITIY SCORE: 24
DAILY ACTIVITIY SCORE: 24
CLIMB 3 TO 5 STEPS WITH RAILING: A LITTLE
MOBILITY SCORE: 23
MOVING TO AND FROM BED TO CHAIR: A LITTLE

## 2024-09-15 ASSESSMENT — ENCOUNTER SYMPTOMS
COUGH: 0
APPETITE CHANGE: 1
CARDIOVASCULAR NEGATIVE: 1
WHEEZING: 1
CHEST TIGHTNESS: 1
ACTIVITY CHANGE: 1
SHORTNESS OF BREATH: 1
FATIGUE: 1

## 2024-09-15 ASSESSMENT — PAIN - FUNCTIONAL ASSESSMENT
PAIN_FUNCTIONAL_ASSESSMENT: 0-10

## 2024-09-15 ASSESSMENT — PAIN SCALES - GENERAL
PAINLEVEL_OUTOF10: 0 - NO PAIN

## 2024-09-15 NOTE — CARE PLAN
The patient's goals for the shift include pt will have decrease sob during shift    The clinical goals for the shift include Patient will remain free from falls throughout this entire shift    Over the shift, the patient did not make progress toward the following goals. Barriers to progression include. Recommendations to address these barriers include.

## 2024-09-15 NOTE — CARE PLAN
Problem: Fall/Injury  Goal: Not fall by end of shift  Outcome: Progressing  Goal: Be free from injury by end of the shift  Outcome: Progressing  Goal: Verbalize understanding of personal risk factors for fall in the hospital  Outcome: Progressing  Goal: Verbalize understanding of risk factor reduction measures to prevent injury from fall in the home  Outcome: Progressing  Goal: Use assistive devices by end of the shift  Outcome: Progressing  Goal: Pace activities to prevent fatigue by end of the shift  Outcome: Progressing     Problem: Pain - Adult  Goal: Verbalizes/displays adequate comfort level or baseline comfort level  Outcome: Progressing     Problem: Safety - Adult  Goal: Free from fall injury  Outcome: Progressing     Problem: Discharge Planning  Goal: Discharge to home or other facility with appropriate resources  Outcome: Progressing     Problem: Chronic Conditions and Co-morbidities  Goal: Patient's chronic conditions and co-morbidity symptoms are monitored and maintained or improved  Outcome: Progressing     Problem: Respiratory  Goal: Minimize anxiety/maximize coping throughout shift  Outcome: Progressing  Goal: Minimal/no exertional discomfort or dyspnea this shift  Outcome: Progressing  Goal: No signs of respiratory distress (eg. Use of accessory muscles. Peds grunting)  Outcome: Progressing  Goal: Verbalize decreased shortness of breath this shift  Outcome: Progressing  Goal: Wean oxygen to maintain O2 saturation per order/standard this shift  Outcome: Progressing   The patient's goals for the shift include pt will have decrease sob during shift    The clinical goals for the shift include Patient will remain free of al pain and discomforts until the end of the shift

## 2024-09-15 NOTE — PROGRESS NOTES
Subjective   Patient ID: Humera Villegas is a 74 y.o. female who presents for Follow-up (Lung check and results).      HPI  Humera Villegas is a 74 y.o. female Past medical history significant for hypertension, COPD, hyperlipidemia, generalized anxiety disorder and osteoporosis presenting t with concern for hypoxia.  Patient was being evaluated in her outpatient primary care office and was sent to the emergency department with concern for low pulse ox.  She also was demonstrating increased shortness of breath with exertion.  Patient states she has had intermittent episodes of hypoxia and dyspnea on exertion over the past few months. Speaking in full sentences however her pulse ox was showing 62% on room air. Patient denies fever, chills, cough, congestion, rhinorrhea, nausea, vomiting or diarrhea. No abdominal pain. No acute complaints.   Was advised to come into the office due to polycythemia.  Liver enz elevated Ultrasound no acute pathology   Lives alone  Using inhaler  No current facility-administered medications on file prior to visit.     Current Outpatient Medications on File Prior to Visit   Medication Sig Dispense Refill    amLODIPine (Norvasc) 2.5 mg tablet TAKE 1 TABLET DAILY 90 tablet 3    atorvastatin (Lipitor) 20 mg tablet TAKE 1 TABLET DAILY 90 tablet 3    cyanocobalamin (Vitamin B-12) 1,000 mcg tablet Take 1 tablet (1,000 mcg) by mouth once daily. 90 tablet 3    denosumab (Prolia) 60 mg/mL syringe Inject 1 mL (60 mg) under the skin every 6 months.      fluticasone (Flonase) 50 mcg/actuation nasal spray Administer 2 sprays into each nostril once daily. Shake gently. Before first use, prime pump. After use, clean tip and replace cap. 16 g 2    fluticasone-umeclidin-vilanter (Trelegy Ellipta) 100-62.5-25 mcg blister with device Inhale 1 puff once daily. 90 each 1    meloxicam (Mobic) 15 mg tablet Take 1 tablet (15 mg) by mouth once daily. (Patient not taking: Reported on 9/12/2024) 30 tablet 3     methocarbamol (Robaxin) 500 mg tablet Take 1-2 tabs every 8 hours as needed for spasms (Patient not taking: Reported on 9/12/2024) 60 tablet 2    omeprazole (PriLOSEC) 20 mg DR capsule TAKE 1 CAPSULE DAILY (Patient taking differently: Take 1 capsule (20 mg) by mouth once daily as needed.) 90 capsule 3    [DISCONTINUED] albuterol 90 mcg/actuation inhaler Inhale 2 puffs 4 times a day.      ALPRAZolam (Xanax) 0.5 mg tablet Take 1 tablet (0.5 mg) by mouth 3 times a day as needed for anxiety for up to 7 days. 21 tablet 0    gabapentin (Neurontin) 100 mg capsule Take 1 capsule (100 mg) by mouth 3 times a day. (Patient not taking: Reported on 9/12/2024) 90 capsule 0        Review of Systems   Constitutional:  Positive for activity change, appetite change and fatigue.   HENT: Negative.     Respiratory:  Positive for chest tightness, shortness of breath and wheezing. Negative for cough.    Cardiovascular: Negative.    Genitourinary: Negative.        Objective   /73   Pulse 108   Resp 19   Wt 48.5 kg (107 lb)   BMI 19.57 kg/m²   BSA: 1.46 meters squared  Growth percentiles: Facility age limit for growth %melodie is 20 years. Facility age limit for growth %melodie is 20 years.   No results displayed because visit has over 200 results.      Lab on 09/09/2024   Component Date Value Ref Range Status    WBC 09/09/2024 5.1  4.4 - 11.3 x10*3/uL Final    nRBC 09/09/2024 0.0  0.0 - 0.0 /100 WBCs Final    RBC 09/09/2024 5.86 (H)  4.00 - 5.20 x10*6/uL Final    Hemoglobin 09/09/2024 18.4 (H)  12.0 - 16.0 g/dL Final    Hematocrit 09/09/2024 57.5 (H)  36.0 - 46.0 % Final    MCV 09/09/2024 98  80 - 100 fL Final    MCH 09/09/2024 31.4  26.0 - 34.0 pg Final    MCHC 09/09/2024 32.0  32.0 - 36.0 g/dL Final    RDW 09/09/2024 15.6 (H)  11.5 - 14.5 % Final    Platelets 09/09/2024 219  150 - 450 x10*3/uL Final    Neutrophils % 09/09/2024 74.1  40.0 - 80.0 % Final    Immature Granulocytes %, Automated 09/09/2024 0.6  0.0 - 0.9 % Final     Immature Granulocyte Count (IG) includes promyelocytes, myelocytes and metamyelocytes but does not include bands. Percent differential counts (%) should be interpreted in the context of the absolute cell counts (cells/UL).    Lymphocytes % 09/09/2024 14.8  13.0 - 44.0 % Final    Monocytes % 09/09/2024 9.1  2.0 - 10.0 % Final    Eosinophils % 09/09/2024 0.8  0.0 - 6.0 % Final    Basophils % 09/09/2024 0.6  0.0 - 2.0 % Final    Neutrophils Absolute 09/09/2024 3.75  1.60 - 5.50 x10*3/uL Final    Percent differential counts (%) should be interpreted in the context of the absolute cell counts (cells/uL).    Immature Granulocytes Absolute, Au* 09/09/2024 0.03  0.00 - 0.50 x10*3/uL Final    Lymphocytes Absolute 09/09/2024 0.75 (L)  0.80 - 3.00 x10*3/uL Final    Monocytes Absolute 09/09/2024 0.46  0.05 - 0.80 x10*3/uL Final    Eosinophils Absolute 09/09/2024 0.04  0.00 - 0.40 x10*3/uL Final    Basophils Absolute 09/09/2024 0.03  0.00 - 0.10 x10*3/uL Final    Albumin 09/09/2024 4.0  3.4 - 5.0 g/dL Final    Bilirubin, Total 09/09/2024 1.4 (H)  0.0 - 1.2 mg/dL Final    Bilirubin, Direct 09/09/2024 0.3  0.0 - 0.3 mg/dL Final    Alkaline Phosphatase 09/09/2024 68  33 - 136 U/L Final    ALT 09/09/2024 71 (H)  7 - 45 U/L Final    Patients treated with Sulfasalazine may generate falsely decreased results for ALT.    AST 09/09/2024 37  9 - 39 U/L Final    Total Protein 09/09/2024 7.6  6.4 - 8.2 g/dL Final    GGT 09/09/2024 31  5 - 55 U/L Final      Physical Exam  Constitutional:       General: She is in acute distress.      Appearance: She is ill-appearing and diaphoretic. She is not toxic-appearing.   Eyes:      Extraocular Movements: Extraocular movements intact.   Cardiovascular:      Rate and Rhythm: Tachycardia present.   Pulmonary:      Breath sounds: Normal breath sounds.   Abdominal:      General: Bowel sounds are normal.   Musculoskeletal:         General: Normal range of motion.      Cervical back: No rigidity.   Skin:      Findings: No rash.   Neurological:      General: No focal deficit present.      Mental Status: She is alert.   Psychiatric:         Thought Content: Thought content normal.         Assessment/Plan   Problem List Items Addressed This Visit             ICD-10-CM    Benign essential hypertension - Primary I10     Patient's blood pressure is at goal of 130/85 or less. Condition is stable. Continue current medications and treatment plan.  I recommend that you exercise for 30-45 minutes 5 days a week.  I also recommend a balanced diet with fruits and vegetables every day, lean meats, and little fried foods. The DASH diet (you can find this online) is a good example of this.           Moderate aortic stenosis I35.0     Possibly causing SOB         Acute respiratory failure (Multi) J96.00     Patient had O2 66% . Patient was sent to ER via 911. O2 placed 3 liters. Sats upto 78

## 2024-09-15 NOTE — PROGRESS NOTES
Humera Villegas is a 74 y.o. female on day 3 of admission presenting with COPD exacerbation (Multi).  Patient with a history of moderate aortic stenosis,HTN, DLP, COPD not on home O2, MOMO, and osteoporosis, who p/w  DOMINGUEZ associated with hypoxia. In the ED was placed on NRB, work up revealed: BNP 700s, bibasilar opacities and diffuse emphysema on chest imaging. Received Solu-Medrol, Mg,  antibiotics and admitted to [facility] for further management. Pulmonary is consulted for COPD, pneumonia and respiratory failure.   Subjective   No acute overnight events. O2 requirements improved to 2L.     Overall is feeling better. SOB improved. Still has some coughing, productive of clear sputum. Wheezing now resolved. Denies CP or any other complaints.   Objective   Scheduled medications  amLODIPine, 2.5 mg, oral, Daily  atorvastatin, 20 mg, oral, Daily  budesonide, 0.5 mg, nebulization, BID  enoxaparin, 40 mg, subcutaneous, q24h JOSEPH  formoterol, 20 mcg, nebulization, BID  gabapentin, 100 mg, oral, TID  ipratropium-albuteroL, 3 mL, nebulization, TID  [START ON 9/16/2024] methylPREDNISolone sodium succinate (PF), 40 mg, intravenous, Daily  oxygen, , inhalation, Continuous - 02/gases  pantoprazole, 40 mg, oral, Daily before breakfast    Continuous medications     PRN medications  PRN medications: ALPRAZolam, alum-mag hydroxide-simeth, ipratropium-albuteroL, oxygen   Physical Exam  Constitutional:       General: She is not in acute distress.     Appearance: She is normal weight. She is not ill-appearing or toxic-appearing.   HENT:      Head: Normocephalic and atraumatic.      Nose:      Comments: On 2L NC     Mouth/Throat:      Mouth: Mucous membranes are moist.      Comments: Mallampati 3.   Eyes:      General: No scleral icterus.     Extraocular Movements: Extraocular movements intact.      Pupils: Pupils are equal, round, and reactive to light.   Cardiovascular:      Rate and Rhythm: Regular rhythm. Tachycardia present.      Heart sounds:  "No murmur heard.     No friction rub. No gallop.   Pulmonary:      Effort: No respiratory distress.      Breath sounds: No wheezing or rales.      Comments: Lung clear but with very poor air entry.   Abdominal:      General: Bowel sounds are normal. There is no distension.      Palpations: Abdomen is soft.      Tenderness: There is no abdominal tenderness.   Musculoskeletal:         General: No tenderness. Normal range of motion.      Cervical back: Normal range of motion and neck supple. No rigidity or tenderness.      Right lower leg: No edema.      Left lower leg: No edema.   Lymphadenopathy:      Cervical: No cervical adenopathy.   Skin:     General: Skin is warm and dry.      Coloration: Skin is not jaundiced.   Neurological:      General: No focal deficit present.      Mental Status: She is alert and oriented to person, place, and time.      Cranial Nerves: No cranial nerve deficit.      Motor: No weakness.   Psychiatric:         Mood and Affect: Mood normal.         Behavior: Behavior normal.     Last Recorded Vitals  Blood pressure 131/53, pulse (!) 111, temperature 36.6 °C (97.9 °F), temperature source Temporal, resp. rate 18, height 1.575 m (5' 2.01\"), weight 53.3 kg (117 lb 8.1 oz), SpO2 90%.  Intake/Output last 3 Shifts:  I/O last 3 completed shifts:  In: - (0 mL/kg)   Out: 300 (5.6 mL/kg) [Urine:300 (0.2 mL/kg/hr)]  Weight: 53.3 kg     Relevant Results  Results for orders placed or performed during the hospital encounter of 09/12/24 (from the past 24 hour(s))   CBC and Auto Differential   Result Value Ref Range    WBC 7.1 4.4 - 11.3 x10*3/uL    nRBC 0.0 0.0 - 0.0 /100 WBCs    RBC 5.05 4.00 - 5.20 x10*6/uL    Hemoglobin 15.3 12.0 - 16.0 g/dL    Hematocrit 50.7 (H) 36.0 - 46.0 %     80 - 100 fL    MCH 30.3 26.0 - 34.0 pg    MCHC 30.2 (L) 32.0 - 36.0 g/dL    RDW 15.5 (H) 11.5 - 14.5 %    Platelets 204 150 - 450 x10*3/uL    Neutrophils % 87.6 40.0 - 80.0 %    Immature Granulocytes %, Automated 0.4 " 0.0 - 0.9 %    Lymphocytes % 5.8 13.0 - 44.0 %    Monocytes % 6.1 2.0 - 10.0 %    Eosinophils % 0.0 0.0 - 6.0 %    Basophils % 0.1 0.0 - 2.0 %    Neutrophils Absolute 6.22 (H) 1.60 - 5.50 x10*3/uL    Immature Granulocytes Absolute, Automated 0.03 0.00 - 0.50 x10*3/uL    Lymphocytes Absolute 0.41 (L) 0.80 - 3.00 x10*3/uL    Monocytes Absolute 0.43 0.05 - 0.80 x10*3/uL    Eosinophils Absolute 0.00 0.00 - 0.40 x10*3/uL    Basophils Absolute 0.01 0.00 - 0.10 x10*3/uL   Basic Metabolic Panel   Result Value Ref Range    Glucose 98 74 - 99 mg/dL    Sodium 134 (L) 136 - 145 mmol/L    Potassium 5.0 3.5 - 5.3 mmol/L    Chloride 101 98 - 107 mmol/L    Bicarbonate 28 21 - 32 mmol/L    Anion Gap 10 10 - 20 mmol/L    Urea Nitrogen 21 6 - 23 mg/dL    Creatinine 0.61 0.50 - 1.05 mg/dL    eGFR >90 >60 mL/min/1.73m*2    Calcium 9.0 8.6 - 10.3 mg/dL   SST TOP   Result Value Ref Range    Extra Tube Hold for add-ons.    Transthoracic Echo (TTE) Complete    Result Date: 9/14/2024   [facility], [address] [place] [address]              Tel [phone] and Fax [phone] TRANSTHORACIC ECHOCARDIOGRAM REPORT  Patient Name:      SUSANNA Johnson Physician:    [id number] Micheal Arroyo MD Study Date:        9/14/2024            Ordering Provider:    [id number] NAFISA RUIZ MRN/PID:           [id number]             Fellow: Accession#:        [id number]         Nurse: Date of Birth/Age: 1950 / 74 years Sonographer:          Ash Cardoso RDCS Gender:            F                    Additional Staff: Height:            157.00 cm            Admit Date: Weight:            48.90 kg             Admission Status:     Inpatient -                                                               Routine BSA / BMI:         1.47 m2 / 19.84      Encounter#:           [id number]                    kg/m2  Blood Pressure:    121/70 mmHg          Department Location:  [facility] Non                                                               Invasive Study Type:    TRANSTHORACIC ECHO (TTE) COMPLETE Diagnosis/ICD: Essential (primary) hypertension-I10 Indication:    hypertension CPT Code:      Echo Complete w Full [id number] Patient History: Pertinent History: HTN. Study Detail: The following Echo studies were performed: 2D, M-Mode, Doppler and               color flow.  PHYSICIAN INTERPRETATION: Left Ventricle: Left ventricular ejection fraction is hyperdynamic, by visual estimate at 80%. There are no regional left ventricular wall motion abnormalities. The left ventricular cavity size is decreased. There is left ventricular concentric remodeling. Left ventricular diastolic filling was indeterminate. Left Atrium: The left atrium is normal in size. Right Ventricle: The right ventricle is moderately enlarged. There is normal right ventricular global systolic function. Right Atrium: The right atrium is mildly dilated. Aortic Valve: The aortic valve is trileaflet. 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 There is moderate to severe aortic valve cusp calcification. There is evidence of moderate to severe aortic valve stenosis. The aortic valve dimensionless index is 0.33. There is no evidence of aortic valve regurgitation. The peak instantaneous gradient of the aortic valve is 64.3 mmHg. The mean gradient of the aortic valve is 35.0 mmHg. Mitral Valve: The mitral valve is normal in structure. There is moderate mitral annular calcification. There is trace mitral valve regurgitation. Tricuspid Valve: The tricuspid valve is structurally normal. There is mild tricuspid regurgitation. The Doppler estimated RVSP is severely elevated at 89.0 mmHg. Pulmonic Valve: The pulmonic valve is structurally normal. There is no  indication of pulmonic valve regurgitation. Pericardium: There is no pericardial effusion noted. Aorta: The aortic root is normal. Systemic Veins: The inferior vena cava appears to be of normal size, IVC inspiratory collapse greater than 50%. In comparison to the previous echocardiogram(s): Compared with study dated 12/27/2023, the degree of aortic stenosis has worsened. There is a higher estimated right ventricular systolic pressure.  CONCLUSIONS:  1. Left ventricular ejection fraction is hyperdynamic, by visual estimate at 80%.  2. Left ventricular diastolic filling was indeterminate.  3. Left ventricular cavity size is decreased.  4. There is normal right ventricular global systolic function.  5. Moderately enlarged right ventricle.  6. There is moderate mitral annular calcification.  7. Severely elevated right ventricular systolic pressure.  8. Moderate to severe aortic valve stenosis.  9. There is moderate to severe aortic valve cusp calcification. QUANTITATIVE DATA SUMMARY:  2D MEASUREMENTS:          Normal Ranges: LAs:             3.16 cm  (2.7-4.0cm) IVSd:            1.28 cm  (0.6-1.1cm) LVPWd:           1.17 cm  (0.6-1.1cm) LVIDd:           2.74 cm  (3.9-5.9cm) LVIDs:           1.39 cm LV Mass Index:   68 g/m2 LVEDV Index:     27 ml/m2 LV % FS          49.2 %  LA VOLUME:                    Normal Ranges: LA Vol A4C:        34.7 ml    (22+/-6mL/m2) LA Vol A2C:        38.6 ml LA Vol BP:         37.2 ml LA Vol Index A4C:  23.6 ml/m2 LA Vol Index A2C:  26.3 ml/m2 LA Vol Index BP:   25.4 ml/m2 LA Area A4C:       14.7 cm2 LA Area A2C:       15.8 cm2 LA Major Axis A4C: 5.3 cm LA Major Axis A2C: 5.5 cm LA Volume Index:   25.4 ml/m2 LA Vol A4C:        32.2 ml LA Vol A2C:        38.2 ml LA Vol Index BSA:  24.0 ml/m2  RA VOLUME BY A/L METHOD:            Normal Ranges: RA Vol A4C:              49.2 ml    (8.3-19.5ml) RA Vol Index A4C:        33.6 ml/m2 RA Area A4C:             16.5 cm2 RA Major Axis A4C:       4.7 cm   M-MODE MEASUREMENTS:         Normal Ranges: LAs:                 3.38 cm (2.7-4.0cm)  LV SYSTOLIC FUNCTION BY 2D PLANIMETRY (MOD):                      Normal Ranges: EF-A4C View:    62 % (>=55%) EF-A2C View:    59 % EF-Biplane:     59 % EF-Visual:      80 % LV EF Reported: 80 %  LV DIASTOLIC FUNCTION:            Normal Ranges: MV Peak E:             0.87 m/s   (0.7-1.2 m/s) MV Peak A:             0.73 m/s   (0.42-0.7 m/s) E/A Ratio:             1.18       (1.0-2.2) PulmV Sys Shola:         65.74 cm/s PulmV Parks Shola:        49.33 cm/s PulmV S/D Shola:         1.33 PulmV A Revs Shola:      40.55 cm/s PulmV A Revs Dur:      85.63 msec  MITRAL VALVE:          Normal Ranges: MV Vmax:      1.07 m/s (<=1.3m/s) MV peak P.6 mmHg (<5mmHg) MV mean P.7 mmHg (<2mmHg) MV VTI:       21.59 cm (10-13cm) MV DT:        174 msec (150-240msec)  AORTIC VALVE:                      Normal Ranges: AoV Vmax:                4.01 m/s  (<=1.7m/s) AoV Peak P.3 mmHg (<20mmHg) AoV Mean P.0 mmHg (1.7-11.5mmHg) LVOT Max Shola:            1.35 m/s  (<=1.1m/s) AoV VTI:                 73.24 cm  (18-25cm) LVOT VTI:                24.50 cm LVOT Diameter:           2.21 cm   (1.8-2.4cm) AoV Area, VTI:           1.29 cm2  (2.5-5.5cm2) AoV Area,Vmax:           1.30 cm2  (2.5-4.5cm2) AoV Dimensionless Index: 0.33  RIGHT VENTRICLE: RV Basal 4.50 cm RV Mid   3.70 cm RV Major 6.6 cm TAPSE:   18.0 mm RV s'    0.12 m/s  TRICUSPID VALVE/RVSP:          Normal Ranges: Peak TR Velocity:     4.64 m/s RV Syst Pressure:     89 mmHg  (< 30mmHg)  PULMONIC VALVE:          Normal Ranges: PV Accel Time:  74 msec  (>120ms) PV Max Shola:     1.3 m/s  (0.6-0.9m/s) PV Max P.0 mmHg  Pulmonary Veins: PulmV A Revs Dur: 85.63 msec PulmV A Revs Shola: 40.55 cm/s PulmV Parks Shola:   49.33 cm/s PulmV S/D Shola:    1.33 PulmV Sys Shola:    65.74 cm/s  AORTA: Asc Ao Diam 3.15 cm  [id number] Micheal Arroyo MD Electronically signed on 2024 at 5:35:52  PM  ** Final **     Assessment/Plan   Assessment & Plan  COPD exacerbation (Multi)    74 YOF with a history of moderate aortic stenosis,HTN, DLP, COPD not on home O2, MOMO, and osteoporosis, who p/w  DOMINGUEZ associated with hypoxia. In the ED was placed on NRB, work up revealed: BNP 700s, bibasilar opacities and diffuse emphysema on chest imaging. Received Solu-Medrol, Mg, antibiotics and admitted to [facility] for further management. Pulmonary is consulted for COPD, pneumonia and respiratory failure.     Respiratory failure: acute with hypoxia, due to below. Now is improving.        Continue supplemental O2, wean off as tolerates       BPH with Acapella, IS       Home O2 evaluation before DC       Management of the individual causes as below     COPD/Emphysema: based on PFT from 2020 that showed: FEV1/VC 39%, FEV1 40%, RV/%, GOLD III with significant air trapping. On Trelegy and albuterol at home. Now likely with acute exacerbation.       Continue budesonide, formoterol and Duo-Neb      Continue systemic steroids, will change to Solumedrol 40 mg daily      Pulmonary FU after DC    Pneumonia: based on CXR that showed bibasilar opacities. However pro-calcitonin WNL       Completed 3 days of Azithromycin       DC Ceftriaxone    Pulmonary nodule: 8 mm non calcified LLL subpleural nodule.         FU chest CT in 6 months    Pulmonary HTN: based on echo from 12/2023 that showed RVSP 66, but also HFpEF. Likely group 2 +/- 3.        Supplemental O2       Volume status optimization as per primary team.          DVT prophylaxis: Lovenox    Pulmonary will FU while in house.    Akosua Croft MD

## 2024-09-15 NOTE — PROGRESS NOTES
Susanna Lowe is a 74 y.o. female on day 2 of admission presenting with COPD exacerbation (Multi).      Subjective   Pt is feeling ok  No pain   Breathign improving   She is not on home oxygen        Objective     Last Recorded Vitals  /70 (BP Location: Left arm)   Pulse 82   Temp 36.2 °C (97.2 °F) (Temporal)   Resp 18   Wt 53.3 kg (117 lb 8.1 oz)   SpO2 99%   Intake/Output last 3 Shifts:  No intake or output data in the 24 hours ending 09/15/24 1002    Admission Weight  Weight: 48.5 kg (107 lb) (09/12/24 1408)      Image Results  Transthoracic Echo (TTE) Complete     [facility], [address] [place] [address]               Tel [phone] and Fax [phone]    TRANSTHORACIC ECHOCARDIOGRAM REPORT       Patient Name:      SUSANNA LOWE         Reading Physician:    [id number] Micheal Arroyo MD  Study Date:        9/14/2024            Ordering Provider:    [id number] NAFISA RUIZ  MRN/PID:           [id number]             Fellow:  Accession#:        [id number]         Nurse:  Date of Birth/Age: 1950 / 74 years Sonographer:          Ash Cardoso RDCS  Gender:            F                    Additional Staff:  Height:            157.00 cm            Admit Date:  Weight:            48.90 kg             Admission Status:     Inpatient -                                                                Routine  BSA / BMI:         1.47 m2 / 19.84      Encounter#:           [id number]                     kg/m2  Blood Pressure:    121/70 mmHg          Department Location:  [facility] Non                                                                Invasive    Study Type:    TRANSTHORACIC ECHO (TTE) COMPLETE  Diagnosis/ICD: Essential (primary) hypertension-I10  Indication:    hypertension  CPT Code:      Echo Complete w Full Doppler-[id number]    Patient History:  Pertinent  History: HTN.    Study Detail: The following Echo studies were performed: 2D, M-Mode, Doppler and                color flow.       PHYSICIAN INTERPRETATION:  Left Ventricle: Left ventricular ejection fraction is hyperdynamic, by visual estimate at 80%. There are no regional left ventricular wall motion abnormalities. The left ventricular cavity size is decreased. There is left ventricular concentric remodeling. Left ventricular diastolic filling was indeterminate.  Left Atrium: The left atrium is normal in size.  Right Ventricle: The right ventricle is moderately enlarged. There is normal right ventricular global systolic function.  Right Atrium: The right atrium is mildly dilated.  Aortic Valve: The aortic valve is trileaflet. 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 There is moderate to severe aortic valve cusp calcification. There is evidence of moderate to severe aortic valve stenosis. The aortic valve dimensionless index is 0.33. There is no evidence of aortic valve regurgitation. The peak instantaneous gradient of the aortic valve is 64.3 mmHg. The mean gradient of the aortic valve is 35.0 mmHg.  Mitral Valve: The mitral valve is normal in structure. There is moderate mitral annular calcification. There is trace mitral valve regurgitation.  Tricuspid Valve: The tricuspid valve is structurally normal. There is mild tricuspid regurgitation. The Doppler estimated RVSP is severely elevated at 89.0 mmHg.  Pulmonic Valve: The pulmonic valve is structurally normal. There is no indication of pulmonic valve regurgitation.  Pericardium: There is no pericardial effusion noted.  Aorta: The aortic root is normal.  Systemic Veins: The inferior vena cava appears to be of normal size, IVC inspiratory collapse greater than 50%.  In comparison to the previous echocardiogram(s): Compared with study dated 12/27/2023, the degree  of aortic stenosis has worsened. There is a higher estimated right ventricular systolic pressure.       CONCLUSIONS:   1. Left ventricular ejection fraction is hyperdynamic, by visual estimate at 80%.   2. Left ventricular diastolic filling was indeterminate.   3. Left ventricular cavity size is decreased.   4. There is normal right ventricular global systolic function.   5. Moderately enlarged right ventricle.   6. There is moderate mitral annular calcification.   7. Severely elevated right ventricular systolic pressure.   8. Moderate to severe aortic valve stenosis.   9. There is moderate to severe aortic valve cusp calcification.    QUANTITATIVE DATA SUMMARY:     2D MEASUREMENTS:          Normal Ranges:  LAs:             3.16 cm  (2.7-4.0cm)  IVSd:            1.28 cm  (0.6-1.1cm)  LVPWd:           1.17 cm  (0.6-1.1cm)  LVIDd:           2.74 cm  (3.9-5.9cm)  LVIDs:           1.39 cm  LV Mass Index:   68 g/m2  LVEDV Index:     27 ml/m2  LV % FS          49.2 %       LA VOLUME:                    Normal Ranges:  LA Vol A4C:        34.7 ml    (22+/-6mL/m2)  LA Vol A2C:        38.6 ml  LA Vol BP:         37.2 ml  LA Vol Index A4C:  23.6 ml/m2  LA Vol Index A2C:  26.3 ml/m2  LA Vol Index BP:   25.4 ml/m2  LA Area A4C:       14.7 cm2  LA Area A2C:       15.8 cm2  LA Major Axis A4C: 5.3 cm  LA Major Axis A2C: 5.5 cm  LA Volume Index:   25.4 ml/m2  LA Vol A4C:        32.2 ml  LA Vol A2C:        38.2 ml  LA Vol Index BSA:  24.0 ml/m2       RA VOLUME BY A/L METHOD:            Normal Ranges:  RA Vol A4C:              49.2 ml    (8.3-19.5ml)  RA Vol Index A4C:        33.6 ml/m2  RA Area A4C:             16.5 cm2  RA Major Axis A4C:       4.7 cm       M-MODE MEASUREMENTS:         Normal Ranges:  LAs:                 3.38 cm (2.7-4.0cm)       LV SYSTOLIC FUNCTION BY 2D PLANIMETRY (MOD):                       Normal Ranges:  EF-A4C View:    62 % (>=55%)  EF-A2C View:    59 %  EF-Biplane:     59 %  EF-Visual:      80 %  LV EF  Reported: 80 %       LV DIASTOLIC FUNCTION:            Normal Ranges:  MV Peak E:             0.87 m/s   (0.7-1.2 m/s)  MV Peak A:             0.73 m/s   (0.42-0.7 m/s)  E/A Ratio:             1.18       (1.0-2.2)  PulmV Sys Shola:         65.74 cm/s  PulmV Parks Shola:        49.33 cm/s  PulmV S/D Shola:         1.33  PulmV A Revs Shola:      40.55 cm/s  PulmV A Revs Dur:      85.63 msec       MITRAL VALVE:          Normal Ranges:  MV Vmax:      1.07 m/s (<=1.3m/s)  MV peak P.6 mmHg (<5mmHg)  MV mean P.7 mmHg (<2mmHg)  MV VTI:       21.59 cm (10-13cm)  MV DT:        174 msec (150-240msec)       AORTIC VALVE:                      Normal Ranges:  AoV Vmax:                4.01 m/s  (<=1.7m/s)  AoV Peak P.3 mmHg (<20mmHg)  AoV Mean P.0 mmHg (1.7-11.5mmHg)  LVOT Max Shola:            1.35 m/s  (<=1.1m/s)  AoV VTI:                 73.24 cm  (18-25cm)  LVOT VTI:                24.50 cm  LVOT Diameter:           2.21 cm   (1.8-2.4cm)  AoV Area, VTI:           1.29 cm2  (2.5-5.5cm2)  AoV Area,Vmax:           1.30 cm2  (2.5-4.5cm2)  AoV Dimensionless Index: 0.33       RIGHT VENTRICLE:  RV Basal 4.50 cm  RV Mid   3.70 cm  RV Major 6.6 cm  TAPSE:   18.0 mm  RV s'    0.12 m/s       TRICUSPID VALVE/RVSP:          Normal Ranges:  Peak TR Velocity:     4.64 m/s  RV Syst Pressure:     89 mmHg  (< 30mmHg)       PULMONIC VALVE:          Normal Ranges:  PV Accel Time:  74 msec  (>120ms)  PV Max Shola:     1.3 m/s  (0.6-0.9m/s)  PV Max P.0 mmHg       Pulmonary Veins:  PulmV A Revs Dur: 85.63 msec  PulmV A Revs Shola: 40.55 cm/s  PulmV Parks Shola:   49.33 cm/s  PulmV S/D Shola:    1.33  PulmV Sys Shola:    65.74 cm/s       AORTA:  Asc Ao Diam 3.15 cm       [id number] Micheal Arroyo MD  Electronically signed on 2024 at 5:35:52 PM       ** Final **  Electrocardiogram, 12-lead PRN ACS symptoms  Sinus tachycardia  Right axis deviation  Pulmonary disease pattern  Right ventricular hypertrophy  T wave  abnormality, consider inferior ischemia  Abnormal ECG  When compared with ECG of 28-MAY-2020 21:11,  Premature ventricular complexes are no longer Present  QRS axis Shifted right  T wave inversion more evident in Inferior leads  Anterolateral leads  See ED provider note for full interpretation and clinical correlation  Confirmed by Latoya Gabriel ([id number]) on 9/14/2024 1:22:19 PM      Physical Exam  Well developed well nurished  No distress  Ao 3  Face symmetrical   Neck no jvd no bruit  Chest fair air entry   CVS regular  Ext no edema  Abdo soft nontender bs active, no masses  Cns alert appropriate able to move ext   Skin intact  Psych normal affect    Relevant Results  Labs reviewed            Assessment/Plan        Assessment & Plan  COPD exacerbation (Multi)    Acute exacerbation of COPD  #Acute on chronic respiratory failure  #Essential polycythemia  #Elevated right ventricular systolic pressures seen on echo done in December  Started DuoNebs/Solu-Medrol/antibiotics  Will repeat an echo and get cardiology consult for possible right heart cath  Pulmonary consulted     #Anxiety disorder  Resume home medications     #Hypertension  Stable continue home medications     #Dyslipidemia  Stable continue home medications     Noted input from cardiology   Contw with steroids and breathign treatments  Wean oxygen as tolerated         Franky Au MD

## 2024-09-15 NOTE — PROGRESS NOTES
Susanna Lowe is a 74 y.o. female on day 2 of admission presenting with COPD exacerbation (Multi).      Subjective   Pt is feeling ok  No pain   Breathign improving   She is not on home oxygen        Objective     Last Recorded Vitals  /53 (BP Location: Right arm, Patient Position: Lying)   Pulse (!) 111   Temp 36.6 °C (97.9 °F) (Temporal)   Resp 18   Wt 53.3 kg (117 lb 8.1 oz)   SpO2 90%   Intake/Output last 3 Shifts:    Intake/Output Summary (Last 24 hours) at 9/15/2024 1945  Last data filed at 9/15/2024 1400  Gross per 24 hour   Intake --   Output 300 ml   Net -300 ml       Admission Weight  Weight: 48.5 kg (107 lb) (09/12/24 1408)      Image Results  Transthoracic Echo (TTE) Complete     [facility], [address] [place] [address]               Tel [phone] and Fax [phone]    TRANSTHORACIC ECHOCARDIOGRAM REPORT       Patient Name:      SUSANNA LOWE         Reading Physician:    [id number] Micheal Arroyo MD  Study Date:        9/14/2024            Ordering Provider:    [id number] NAFISA RUIZ  MRN/PID:           [id number]             Fellow:  Accession#:        [id number]         Nurse:  Date of Birth/Age: 1950 / 74 years Sonographer:          Ash Cardoso RDCS  Gender:            F                    Additional Staff:  Height:            157.00 cm            Admit Date:  Weight:            48.90 kg             Admission Status:     Inpatient -                                                                Routine  BSA / BMI:         1.47 m2 / 19.84      Encounter#:           [id number]                     kg/m2  Blood Pressure:    121/70 mmHg          Department Location:  [facility] Non                                                                Invasive    Study Type:    TRANSTHORACIC ECHO (TTE) COMPLETE  Diagnosis/ICD: Essential (primary)  hypertension-I10  Indication:    hypertension  CPT Code:      Echo Complete w Full [id number]    Patient History:  Pertinent History: HTN.    Study Detail: The following Echo studies were performed: 2D, M-Mode, Doppler and                color flow.       PHYSICIAN INTERPRETATION:  Left Ventricle: Left ventricular ejection fraction is hyperdynamic, by visual estimate at 80%. There are no regional left ventricular wall motion abnormalities. The left ventricular cavity size is decreased. There is left ventricular concentric remodeling. Left ventricular diastolic filling was indeterminate.  Left Atrium: The left atrium is normal in size.  Right Ventricle: The right ventricle is moderately enlarged. There is normal right ventricular global systolic function.  Right Atrium: The right atrium is mildly dilated.  Aortic Valve: The aortic valve is trileaflet. 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 There is moderate to severe aortic valve cusp calcification. There is evidence of moderate to severe aortic valve stenosis. The aortic valve dimensionless index is 0.33. There is no evidence of aortic valve regurgitation. The peak instantaneous gradient of the aortic valve is 64.3 mmHg. The mean gradient of the aortic valve is 35.0 mmHg.  Mitral Valve: The mitral valve is normal in structure. There is moderate mitral annular calcification. There is trace mitral valve regurgitation.  Tricuspid Valve: The tricuspid valve is structurally normal. There is mild tricuspid regurgitation. The Doppler estimated RVSP is severely elevated at 89.0 mmHg.  Pulmonic Valve: The pulmonic valve is structurally normal. There is no indication of pulmonic valve regurgitation.  Pericardium: There is no pericardial effusion noted.  Aorta: The aortic root is normal.  Systemic Veins: The inferior vena cava appears to be of normal size, IVC inspiratory  collapse greater than 50%.  In comparison to the previous echocardiogram(s): Compared with study dated 12/27/2023, the degree of aortic stenosis has worsened. There is a higher estimated right ventricular systolic pressure.       CONCLUSIONS:   1. Left ventricular ejection fraction is hyperdynamic, by visual estimate at 80%.   2. Left ventricular diastolic filling was indeterminate.   3. Left ventricular cavity size is decreased.   4. There is normal right ventricular global systolic function.   5. Moderately enlarged right ventricle.   6. There is moderate mitral annular calcification.   7. Severely elevated right ventricular systolic pressure.   8. Moderate to severe aortic valve stenosis.   9. There is moderate to severe aortic valve cusp calcification.    QUANTITATIVE DATA SUMMARY:     2D MEASUREMENTS:          Normal Ranges:  LAs:             3.16 cm  (2.7-4.0cm)  IVSd:            1.28 cm  (0.6-1.1cm)  LVPWd:           1.17 cm  (0.6-1.1cm)  LVIDd:           2.74 cm  (3.9-5.9cm)  LVIDs:           1.39 cm  LV Mass Index:   68 g/m2  LVEDV Index:     27 ml/m2  LV % FS          49.2 %       LA VOLUME:                    Normal Ranges:  LA Vol A4C:        34.7 ml    (22+/-6mL/m2)  LA Vol A2C:        38.6 ml  LA Vol BP:         37.2 ml  LA Vol Index A4C:  23.6 ml/m2  LA Vol Index A2C:  26.3 ml/m2  LA Vol Index BP:   25.4 ml/m2  LA Area A4C:       14.7 cm2  LA Area A2C:       15.8 cm2  LA Major Axis A4C: 5.3 cm  LA Major Axis A2C: 5.5 cm  LA Volume Index:   25.4 ml/m2  LA Vol A4C:        32.2 ml  LA Vol A2C:        38.2 ml  LA Vol Index BSA:  24.0 ml/m2       RA VOLUME BY A/L METHOD:            Normal Ranges:  RA Vol A4C:              49.2 ml    (8.3-19.5ml)  RA Vol Index A4C:        33.6 ml/m2  RA Area A4C:             16.5 cm2  RA Major Axis A4C:       4.7 cm       M-MODE MEASUREMENTS:         Normal Ranges:  LAs:                 3.38 cm (2.7-4.0cm)       LV SYSTOLIC FUNCTION BY 2D PLANIMETRY (MOD):                        Normal Ranges:  EF-A4C View:    62 % (>=55%)  EF-A2C View:    59 %  EF-Biplane:     59 %  EF-Visual:      80 %  LV EF Reported: 80 %       LV DIASTOLIC FUNCTION:            Normal Ranges:  MV Peak E:             0.87 m/s   (0.7-1.2 m/s)  MV Peak A:             0.73 m/s   (0.42-0.7 m/s)  E/A Ratio:             1.18       (1.0-2.2)  PulmV Sys Shola:         65.74 cm/s  PulmV Parks Shola:        49.33 cm/s  PulmV S/D Shola:         1.33  PulmV A Revs Shola:      40.55 cm/s  PulmV A Revs Dur:      85.63 msec       MITRAL VALVE:          Normal Ranges:  MV Vmax:      1.07 m/s (<=1.3m/s)  MV peak P.6 mmHg (<5mmHg)  MV mean P.7 mmHg (<2mmHg)  MV VTI:       21.59 cm (10-13cm)  MV DT:        174 msec (150-240msec)       AORTIC VALVE:                      Normal Ranges:  AoV Vmax:                4.01 m/s  (<=1.7m/s)  AoV Peak P.3 mmHg (<20mmHg)  AoV Mean P.0 mmHg (1.7-11.5mmHg)  LVOT Max Shola:            1.35 m/s  (<=1.1m/s)  AoV VTI:                 73.24 cm  (18-25cm)  LVOT VTI:                24.50 cm  LVOT Diameter:           2.21 cm   (1.8-2.4cm)  AoV Area, VTI:           1.29 cm2  (2.5-5.5cm2)  AoV Area,Vmax:           1.30 cm2  (2.5-4.5cm2)  AoV Dimensionless Index: 0.33       RIGHT VENTRICLE:  RV Basal 4.50 cm  RV Mid   3.70 cm  RV Major 6.6 cm  TAPSE:   18.0 mm  RV s'    0.12 m/s       TRICUSPID VALVE/RVSP:          Normal Ranges:  Peak TR Velocity:     4.64 m/s  RV Syst Pressure:     89 mmHg  (< 30mmHg)       PULMONIC VALVE:          Normal Ranges:  PV Accel Time:  74 msec  (>120ms)  PV Max Sohla:     1.3 m/s  (0.6-0.9m/s)  PV Max P.0 mmHg       Pulmonary Veins:  PulmV A Revs Dur: 85.63 msec  PulmV A Revs Shola: 40.55 cm/s  PulmV Parks Shola:   49.33 cm/s  PulmV S/D Shola:    1.33  PulmV Sys Shola:    65.74 cm/s       AORTA:  Asc Ao Diam 3.15 cm       [id number] Micheal Arroyo MD  Electronically signed on 2024 at 5:35:52 PM       ** Final **  Electrocardiogram, 12-lead PRN ACS  symptoms  Sinus tachycardia  Right axis deviation  Pulmonary disease pattern  Right ventricular hypertrophy  T wave abnormality, consider inferior ischemia  Abnormal ECG  When compared with ECG of 28-MAY-2020 21:11,  Premature ventricular complexes are no longer Present  QRS axis Shifted right  T wave inversion more evident in Inferior leads  Anterolateral leads  See ED provider note for full interpretation and clinical correlation  Confirmed by Latoya Gabriel ([id number]) on 9/14/2024 1:22:19 PM      Physical Exam  Well developed well nurished  No distress  Ao 3  Face symmetrical   Neck no jvd no bruit  Chest fair air entry   CVS regular  Ext no edema  Abdo soft nontender bs active, no masses  Cns alert appropriate able to move ext   Skin intact  Psych normal affect    Relevant Results  Labs reviewed            Assessment/Plan        Assessment & Plan  COPD exacerbation (Multi)    Acute exacerbation of COPD  #Acute on chronic respiratory failure  #Essential polycythemia  #Elevated right ventricular systolic pressures seen on echo done in December  Started DuoNebs/Solu-Medrol/antibiotics  Will repeat an echo and get cardiology consult for possible right heart cath  Pulmonary consulted  Noted input from cardio and pulm will need repeat echo in 6 months     #Anxiety disorder  Resume home medications     #Hypertension  Stable continue home medications     #Dyslipidemia  Stable continue home medications    Noted input from cardiology   Contw with steroids and breathign treatments  Wean oxygen as tolerated    Aortic stenosis dw pt and daughter       Franky Au MD

## 2024-09-15 NOTE — CONSULTS
Nutrition Assessment Note  Nutrition Assessment      Reason for Assessment  Reason for Assessment: Provider consult order    RDN consulted by pulmonary CNP 2/2 reduced appetite c/b severe emphysema, pulm HTN.     Pt admitted with worsening SOB , loss of appetite and wt loss.   CT upon admission showed severe emphysema.   PMH includes COPD, osteoporosis, HTN, compression fracture of thoracic vertebra.     Pt very pleasant, stated her appetite has improved since she's been admitted. Pt likes Ensure Plus indra, however she was drinking Boost Very High Calorie (VHC) prior to admission, was given to her by a friend, and she really likes it.     Recommend provide pt with a  prescription for Boost Very High Calorie, one carton three times daily (po).           History:  Food and Nutrient History  Energy Intake: Fair 50-75 %, Poor < 50 %  Food and Nutrient History: regular diet at home, however gets very full quickly.  Vitamin/Herbal Supplement Use: recently started drinking Boost VHC provided to her from a friend, she likes this very much and will drink at least one per day, but is receptive to drinking two per day if she could have a prescription for it (too expensive) ; pt likes ensure Plus, as well, chocolate only       Factors Affecting Access to Food and Food / Nutrition Related Supplies  Safe Food/Meal Availability: Appropriate meal preparation facilities (pt has h/o back fracture, has difficulty lifting things and/or bringing items into house)    Dietary Orders (From admission, onward)       Start     Ordered    09/15/24 1315  Oral nutritional supplements  Until discontinued        Comments: Chocolate   Question Answer Comment   Deliver with Breakfast    Deliver with Lunch    Deliver with Dinner    Select supplement: Ensure Plus High Protein        09/15/24 1315    09/12/24 2113  Adult diet Regular  Diet effective now        Question:  Diet type  Answer:  Regular    09/12/24 2112                  I/O last 3 completed  "shifts:  In: 50 (0.9 mL/kg) [IV Piggyback:50]  Out: 500 (9.4 mL/kg) [Urine:500 (0.3 mL/kg/hr)]  Weight: 53.3 kg   I/O this shift:  In: -   Out: 300 [Urine:300]  X1 BM 9/12       Anthropometrics:  Height: 157.5 cm (5' 2.01\")  Weight: 53.3 kg (117 lb 8.1 oz)  BMI (Calculated): 21.49    Weight Change  Weight History / % Weight Change: 53.1 kg 4/8/24, 52.6 kg 5/16/23  Significant Weight Loss: No     IBW/kg (Dietitian Calculated): 50 kg        Wt Readings from Last 20 Encounters:   09/15/24 53.3 kg (117 lb 8.1 oz)   09/12/24 48.5 kg (107 lb)   06/24/24 49.4 kg (108 lb 12.8 oz)   05/08/24 49.9 kg (110 lb)   04/08/24 53.1 kg (117 lb)   12/04/23 53.1 kg (117 lb)   05/16/23 52.6 kg (116 lb)   03/29/23 53.5 kg (118 lb)   11/15/22 54.4 kg (120 lb)   05/17/22 55.3 kg (122 lb)   11/17/21 55.3 kg (122 lb)   09/15/21 54.9 kg (121 lb)   05/19/21 54.9 kg (121 lb)   02/15/21 54.4 kg (120 lb)   08/27/20 49 kg (108 lb)   06/04/20 49 kg (108 lb)     Scheduled medications  amLODIPine, 2.5 mg, oral, Daily  atorvastatin, 20 mg, oral, Daily  budesonide, 0.5 mg, nebulization, BID  cefTRIAXone, 2 g, intravenous, q24h  enoxaparin, 40 mg, subcutaneous, q24h JOSEPH  formoterol, 20 mcg, nebulization, BID  gabapentin, 100 mg, oral, TID  ipratropium-albuteroL, 3 mL, nebulization, TID  methylPREDNISolone sodium succinate (PF), 40 mg, intravenous, q12h  oxygen, , inhalation, Continuous - 02/gases  pantoprazole, 40 mg, oral, Daily before breakfast      Continuous medications     PRN medications  PRN medications: ALPRAZolam, alum-mag hydroxide-simeth, ipratropium-albuteroL, oxygen      Latest Reference Range & Units 09/15/24 07:48   GLUCOSE 74 - 99 mg/dL 98   SODIUM 136 - 145 mmol/L 134 (L)   POTASSIUM 3.5 - 5.3 mmol/L 5.0   CHLORIDE 98 - 107 mmol/L 101   Bicarbonate 21 - 32 mmol/L 28   Anion Gap 10 - 20 mmol/L 10   Blood Urea Nitrogen 6 - 23 mg/dL 21   Creatinine 0.50 - 1.05 mg/dL 0.61   EGFR >60 mL/min/1.73m*2 >90   Calcium 8.6 - 10.3 mg/dL 9.0   (L): " "Data is abnormally low     Latest Reference Range & Units 09/03/24 12:01   Vitamin D, 25-Hydroxy, Total 30 - 100 ng/mL 58           Energy Needs:  Calculated Energy Needs Using Equations  Height: 157.5 cm (5' 2.01\")  Temp: 36.4 °C (97.6 °F)    Estimated Energy Needs  Total Energy Estimated Needs (kCal): 1600 kCal  Total Estimated Energy Need per Day (kCal/kg): 30 kCal/kg  Method for Estimating Needs: 1527-8034 kcal/day (25-32 kcal/kg)    Estimated Protein Needs  Method for Estimating Needs: 60-75 g/day (1.2-1.5 g/kg IBW)    Estimated Fluid Needs  Total Fluid Estimated Needs (mL): 1350 mL  Total Fluid Estimated Needs (mL/kg): 25 mL/kg  Method for Estimating Needs: or as per MD         Nutrition Focused Physical Findings:  Subcutaneous Fat Loss  Orbital Fat Pads: Severe (dark circles, hollowing and loose skin) (NFPE per observation only, pt about to eat lunch)  Buccal Fat Pads: Mild-Moderate (flat cheeks, minimal bounce)  Triceps: Mild-Moderate (less than ample fat tissue)  Ribs: Mild-Moderate (ribs apparent, depressions between them less pronounced, iliac crest somewhat prominent)    Muscle Wasting  Temporalis: Mild-Moderate (slight depression)  Pectoralis (Clavicular Region): Mild-Moderate (some protrusion of clavicle)  Deltoid/Trapezius: Mild-Moderate (slight protrusion of acromion process)  Interosseous: Mild-Moderate (slightly depressed area between thumb and forefinger)  Trapezius/Infraspinatus/Supraspinatus (Scapular Region): Defer  Quadriceps: Defer  Gastrocnemius: Defer    Edema  Edema: none         Physical Findings (Nutrition Deficiency/Toxicity)  Skin: Negative       Nutrition Diagnosis   Malnutrition Diagnosis  Patient has Malnutrition Diagnosis: Yes  Diagnosis Status: New  Malnutrition Diagnosis: Severe malnutrition related to chronic disease or condition  As Evidenced by: moderate/severely depleted adipose tissues and skeletal tissue wasting per observation, oral intake less than 75% of estimated energy " requirements for greater than one month related to compromised respiratory status    Patient has Nutrition Diagnosis: Yes  Nutrition Diagnosis 1: Increased nutrient needs  Diagnosis Status (1): New  Related to (1): metabolic demand  As Evidenced by (1): COPD          Nutrition Interventions/Recommendations   Nutrition Prescription  Individualized Nutrition Prescription Provided for : continue regular diet along with ensure plus TID with meals  - bowel regimen prn   - encouraged small frequent meals throughout the day   - encouraged for pt to drink at least two oral nutrition supplements per day, pt receptive   - MVI once daily   - suggest referral to outpatient dietitian   - suggest PT/OT consult , pt stated she has difficulty lifting items 2/2 remote back fracture, ? If pt has difficulties with ADLs           Food and/or Nutrient Delivery Interventions  Meals and Snacks: Other (Comment)  Goal: regular diet           Medical Food Supplement: Commercial beverage  Goal: at least two oral nutrition supplements daily        Coordination of Nutrition Care by a Nutrition Professional  Collaboration and Referral of Nutrition Care: Collaboration by nutrition professional with other providers    Education Documentation  Nutrition Care Manual, taught by Jacquie Fitch RD at 9/15/2024  3:11 PM.  Learner: Patient  Readiness: Eager  Method: Explanation, Handout, Teach-back  Response: Verbalizes Understanding  Comment: nutrition therapy for COPD, high calorie/high protein nutrition therapy - both verbal and written instructions provided - RDN business card provided - provided pt with $5 off coupon for Ensure (from newspaper)             Nutrition Monitoring and Evaluation   Food and Nutrient Related History  Energy Intake: Estimated energy intake  Criteria: greater than 75% of meals    Fluid Intake: Estimated fluid intake  Criteria: monitor closely, at least 75% of daily fluid recommendations         Anthropometrics: Body  Composition/Growth/Weight History  Weight: Weight change  Criteria: daily wts, monitor trend                   Biochemical Data, Medical Tests and Procedures  Electrolyte and Renal Panel: BUN, Sodium, Calcium, serum, Chloride, Creatinine, Magnesium, Phosphorus, Potassium  Criteria: daily    Gastrointestinal Profile: Alanine aminotransferase (ALT), Alkaline phosphatase, Bilirubin, total, Aspartate aminotransferase (AST)  Criteria: as per MD    Glucose/Endocrine Profile: Glucose, casual  Criteria: daily, or as per MD    Nutritional Anemia Profile: Hematocrit, Hemoglobin, Iron, serum  Criteria: as per MD    Vitamin Profile: Vitamin D, 25 hydroxy  Criteria: as indicated    Nutrition Focused Physical Findings  Adipose: Other (Comment)  Criteria: monitor NFPE    Bones: Other (Comment)  Criteria: monitor skeletal integrity    Digestive System: Abdominal distension, Increased appetite, Nausea, Vomiting, Anorexia, Ascites, Constipation, Decrease in appetite, Diarrhea, Early satiety, Other (Comment)  Criteria: monitor GI function closely    Muscles: Muscle atrophy  Criteria: monitor NFPE    Skin: Other (Comment)  Criteria: monitor skin integrity closely              Follow Up  Time Spent (min): 70 minutes  Last Date of Nutrition Visit: 09/15/24  Nutrition Follow-Up Needed?: Dietitian to reassess per policy  Follow up Comment: svr maln, COPD: HARLEY

## 2024-09-15 NOTE — PROGRESS NOTES
"Subjective Data:  No new complaints.         Objective Data:  Last Recorded Vitals:  Vitals:    09/15/24 0058 09/15/24 0332 09/15/24 0805 09/15/24 0904   BP: 97/75 123/60 127/70    BP Location: Left arm Left arm Left arm    Patient Position: Lying Sitting     Pulse: 73 83 76 82   Resp: 14 18 18    Temp: 37 °C (98.6 °F) 36.2 °C (97.1 °F) 36.2 °C (97.2 °F)    TempSrc: Temporal Temporal Temporal    SpO2: 97% 90% 98% 99%   Weight:  53.3 kg (117 lb 8.1 oz)     Height:         Medical Gas Therapy: Supplemental oxygen  Medical Gas Delivery Method: Nasal cannula  Weight  Av.7 kg (109 lb 8.4 oz)  Min: 48.4 kg (106 lb 9.6 oz)  Max: 53.3 kg (117 lb 8.1 oz)    LABS:  CMP:  Results from last 7 days   Lab Units 09/15/24  0748 24  0717 24  1303 24  0658 24  1413 24  1339   SODIUM mmol/L 134* 132* 128* 133* 131*  --    POTASSIUM mmol/L 5.0 4.6 4.4 4.3 4.0  --    CHLORIDE mmol/L 101 100 100 99 97*  --    CO2 mmol/L 28 26 18* 23 23  --    ANION GAP mmol/L 10 11 14 15 15  --    BUN mg/dL 21 17 16 15 15  --    CREATININE mg/dL 0.61 0.58 0.60 0.68 0.80  --    EGFR mL/min/1.73m*2 >90 >90 >90 90 76  --    ALBUMIN g/dL  --   --   --   --  4.1 4.0   ALT U/L  --   --   --   --  54* 71*   AST U/L  --   --   --   --  33 37   BILIRUBIN TOTAL mg/dL  --   --   --   --  1.4* 1.4*     CBC:  Results from last 7 days   Lab Units 09/15/24  0748 24  0907 24  1429 24  0658 24  1413 24  1339   WBC AUTO x10*3/uL 7.1 9.1 8.1 4.1* 5.8 5.1   HEMOGLOBIN g/dL 15.3 16.1* 16.1* 16.2* 18.1* 18.4*   HEMATOCRIT % 50.7* 51.4* 51.0* 51.4* 56.2* 57.5*   PLATELETS AUTO x10*3/uL 204 211 223 197 227 219   MCV fL 100 99 99 98 97 98     COAG:     ABO: No results found for: \"ABO\"  HEME/ENDO:     CARDIAC:   Results from last 7 days   Lab Units 24  1637 24  1413   TROPHS ng/L 33* 30*   BNP pg/mL  --  729*             Last I/O:  No intake or output data in the 24 hours ending 09/15/24 1054  Net IO " Since Admission: -400 mL [09/15/24 1054]            Inpatient Medications:  Scheduled medications   Medication Dose Route Frequency    amLODIPine  2.5 mg oral Daily    atorvastatin  20 mg oral Daily    budesonide  0.5 mg nebulization BID    cefTRIAXone  2 g intravenous q24h    enoxaparin  40 mg subcutaneous q24h JOSEPH    formoterol  20 mcg nebulization BID    gabapentin  100 mg oral TID    ipratropium-albuteroL  3 mL nebulization TID    methylPREDNISolone sodium succinate (PF)  40 mg intravenous q12h    oxygen   inhalation Continuous - 02/gases    pantoprazole  40 mg oral Daily before breakfast     PRN medications   Medication    ALPRAZolam    alum-mag hydroxide-simeth    ipratropium-albuteroL    oxygen     Continuous Medications   Medication Dose Last Rate           Physical Exam:  Gen Well appearing septuagenarian female sitting up in NAD. Body mass index is 21.49 kg/m².   CV rrr. 3/6 LAVELL. No JVD or leg edema.   Pulm Lungs clear with normal respiratory effort.  Neuro Alert and conversant. Grossly nonfocal.            Assessment:  Pulmonary Hypertension   In the setting of severe emphysema.   Moderate-severe Aortic stenosis  For outpatient monitoring. Plan to repeat in 6 mos.  Hypertension   BP acceptable. Her present regimen is to continue.   Shortness of breath  2/2 COPD exacerbation. Not consistent with heart failure.     Recommendations   No cardiac barriers to discharge. Follow up with me later this year (to be arranged by my ).    Will sign off. Call with ?'s              Micheal Arroyo MD

## 2024-09-16 LAB
ANION GAP SERPL CALC-SCNC: 10 MMOL/L (ref 10–20)
BASOPHILS # BLD AUTO: 0.01 X10*3/UL (ref 0–0.1)
BASOPHILS NFR BLD AUTO: 0.1 %
BUN SERPL-MCNC: 22 MG/DL (ref 6–23)
CALCIUM SERPL-MCNC: 8.4 MG/DL (ref 8.6–10.3)
CHLORIDE SERPL-SCNC: 102 MMOL/L (ref 98–107)
CO2 SERPL-SCNC: 26 MMOL/L (ref 21–32)
CREAT SERPL-MCNC: 0.53 MG/DL (ref 0.5–1.05)
EGFRCR SERPLBLD CKD-EPI 2021: >90 ML/MIN/1.73M*2
EOSINOPHIL # BLD AUTO: 0 X10*3/UL (ref 0–0.4)
EOSINOPHIL NFR BLD AUTO: 0 %
ERYTHROCYTE [DISTWIDTH] IN BLOOD BY AUTOMATED COUNT: 15.2 % (ref 11.5–14.5)
GLUCOSE SERPL-MCNC: 127 MG/DL (ref 74–99)
HCT VFR BLD AUTO: 48.7 % (ref 36–46)
HGB BLD-MCNC: 15.1 G/DL (ref 12–16)
IMM GRANULOCYTES # BLD AUTO: 0.04 X10*3/UL (ref 0–0.5)
IMM GRANULOCYTES NFR BLD AUTO: 0.6 % (ref 0–0.9)
LYMPHOCYTES # BLD AUTO: 0.4 X10*3/UL (ref 0.8–3)
LYMPHOCYTES NFR BLD AUTO: 5.6 %
MCH RBC QN AUTO: 30.7 PG (ref 26–34)
MCHC RBC AUTO-ENTMCNC: 31 G/DL (ref 32–36)
MCV RBC AUTO: 99 FL (ref 80–100)
MONOCYTES # BLD AUTO: 0.52 X10*3/UL (ref 0.05–0.8)
MONOCYTES NFR BLD AUTO: 7.3 %
NEUTROPHILS # BLD AUTO: 6.2 X10*3/UL (ref 1.6–5.5)
NEUTROPHILS NFR BLD AUTO: 86.4 %
NRBC BLD-RTO: 0 /100 WBCS (ref 0–0)
PLATELET # BLD AUTO: 188 X10*3/UL (ref 150–450)
POTASSIUM SERPL-SCNC: 4.1 MMOL/L (ref 3.5–5.3)
RBC # BLD AUTO: 4.92 X10*6/UL (ref 4–5.2)
SODIUM SERPL-SCNC: 134 MMOL/L (ref 136–145)
WBC # BLD AUTO: 7.2 X10*3/UL (ref 4.4–11.3)

## 2024-09-16 PROCEDURE — 99233 SBSQ HOSP IP/OBS HIGH 50: CPT | Performed by: INTERNAL MEDICINE

## 2024-09-16 PROCEDURE — 2500000005 HC RX 250 GENERAL PHARMACY W/O HCPCS: Performed by: INTERNAL MEDICINE

## 2024-09-16 PROCEDURE — 2500000002 HC RX 250 W HCPCS SELF ADMINISTERED DRUGS (ALT 637 FOR MEDICARE OP, ALT 636 FOR OP/ED): Performed by: PHARMACIST

## 2024-09-16 PROCEDURE — 94668 MNPJ CHEST WALL SBSQ: CPT

## 2024-09-16 PROCEDURE — 80048 BASIC METABOLIC PNL TOTAL CA: CPT | Performed by: INTERNAL MEDICINE

## 2024-09-16 PROCEDURE — 2500000004 HC RX 250 GENERAL PHARMACY W/ HCPCS (ALT 636 FOR OP/ED): Performed by: INTERNAL MEDICINE

## 2024-09-16 PROCEDURE — 94640 AIRWAY INHALATION TREATMENT: CPT

## 2024-09-16 PROCEDURE — 2500000002 HC RX 250 W HCPCS SELF ADMINISTERED DRUGS (ALT 637 FOR MEDICARE OP, ALT 636 FOR OP/ED): Performed by: INTERNAL MEDICINE

## 2024-09-16 PROCEDURE — 99232 SBSQ HOSP IP/OBS MODERATE 35: CPT | Performed by: INTERNAL MEDICINE

## 2024-09-16 PROCEDURE — 36415 COLL VENOUS BLD VENIPUNCTURE: CPT | Performed by: INTERNAL MEDICINE

## 2024-09-16 PROCEDURE — 2500000001 HC RX 250 WO HCPCS SELF ADMINISTERED DRUGS (ALT 637 FOR MEDICARE OP): Performed by: INTERNAL MEDICINE

## 2024-09-16 PROCEDURE — 85025 COMPLETE CBC W/AUTO DIFF WBC: CPT | Performed by: INTERNAL MEDICINE

## 2024-09-16 PROCEDURE — 9420000001 HC RT PATIENT EDUCATION 5 MIN

## 2024-09-16 PROCEDURE — 1200000002 HC GENERAL ROOM WITH TELEMETRY DAILY

## 2024-09-16 ASSESSMENT — PAIN - FUNCTIONAL ASSESSMENT
PAIN_FUNCTIONAL_ASSESSMENT: 0-10

## 2024-09-16 ASSESSMENT — COGNITIVE AND FUNCTIONAL STATUS - GENERAL
MOBILITY SCORE: 24
DAILY ACTIVITIY SCORE: 24
MOBILITY SCORE: 23
MOVING TO AND FROM BED TO CHAIR: A LITTLE
DAILY ACTIVITIY SCORE: 24

## 2024-09-16 ASSESSMENT — PAIN SCALES - GENERAL
PAINLEVEL_OUTOF10: 0 - NO PAIN

## 2024-09-16 NOTE — CARE PLAN
Problem: Fall/Injury  Goal: Not fall by end of shift  Outcome: Progressing  Goal: Be free from injury by end of the shift  Outcome: Progressing  Goal: Verbalize understanding of personal risk factors for fall in the hospital  Outcome: Progressing  Goal: Verbalize understanding of risk factor reduction measures to prevent injury from fall in the home  Outcome: Progressing  Goal: Use assistive devices by end of the shift  Outcome: Progressing  Goal: Pace activities to prevent fatigue by end of the shift  Outcome: Progressing     Problem: Pain - Adult  Goal: Verbalizes/displays adequate comfort level or baseline comfort level  Outcome: Progressing     Problem: Safety - Adult  Goal: Free from fall injury  Outcome: Progressing     Problem: Chronic Conditions and Co-morbidities  Goal: Patient's chronic conditions and co-morbidity symptoms are monitored and maintained or improved  Outcome: Progressing     Problem: Respiratory  Goal: Minimal/no exertional discomfort or dyspnea this shift  Outcome: Progressing  Goal: No signs of respiratory distress (eg. Use of accessory muscles. Peds grunting)  Outcome: Progressing  Goal: Verbalize decreased shortness of breath this shift  Outcome: Progressing  Goal: Wean oxygen to maintain O2 saturation per order/standard this shift  Outcome: Progressing

## 2024-09-16 NOTE — PROGRESS NOTES
09/16/24 1449   Current Planned Discharge Disposition   Current Planned Discharge Disposition Home H     Pt cont IV steroids. Adod 1-3 days.

## 2024-09-16 NOTE — PROGRESS NOTES
Humera Villegas is a 74 y.o. female     Patient's breathing is improving  Remains on 4 L of oxygen  Will need home O2    Review of Systems     Constitutional: no fever, no chills, not feeling poorly, not feeling tired   Cardiovascular: no chest pain   Respiratory: Short of breath  Gastrointestinal: no abdominal pain, no constipation, no melena, no nausea, no diarrhea, no vomiting and no blood in stools.   Neurological: no headache,   All other systems have been reviewed and are negative for complaint.       Vitals:    09/16/24 1618   BP: 117/65   Pulse:    Resp: 16   Temp: 36.2 °C (97.2 °F)   SpO2: 93%        Scheduled medications  amLODIPine, 2.5 mg, oral, Daily  atorvastatin, 20 mg, oral, Daily  budesonide, 0.5 mg, nebulization, BID  enoxaparin, 40 mg, subcutaneous, q24h JOSEPH  formoterol, 20 mcg, nebulization, BID  gabapentin, 100 mg, oral, TID  ipratropium-albuteroL, 3 mL, nebulization, TID  methylPREDNISolone sodium succinate (PF), 40 mg, intravenous, Daily  oxygen, , inhalation, Continuous - 02/gases  pantoprazole, 40 mg, oral, Daily before breakfast      Continuous medications     PRN medications  PRN medications: ALPRAZolam, alum-mag hydroxide-simeth, ipratropium-albuteroL, oxygen    Lab Review   Results from last 7 days   Lab Units 09/16/24  0714 09/15/24  0748 09/14/24  0907   WBC AUTO x10*3/uL 7.2 7.1 9.1   HEMOGLOBIN g/dL 15.1 15.3 16.1*   HEMATOCRIT % 48.7* 50.7* 51.4*   PLATELETS AUTO x10*3/uL 188 204 211     Results from last 7 days   Lab Units 09/16/24  0714 09/15/24  0748 09/14/24  0717 09/13/24  0658 09/12/24  1413   SODIUM mmol/L 134* 134* 132*   < > 131*   POTASSIUM mmol/L 4.1 5.0 4.6   < > 4.0   CHLORIDE mmol/L 102 101 100   < > 97*   CO2 mmol/L 26 28 26   < > 23   BUN mg/dL 22 21 17   < > 15   CREATININE mg/dL 0.53 0.61 0.58   < > 0.80   CALCIUM mg/dL 8.4* 9.0 8.6   < > 9.7   PROTEIN TOTAL g/dL  --   --   --   --  8.4*   BILIRUBIN TOTAL mg/dL  --   --   --   --  1.4*   ALK PHOS U/L  --   --   --    --  65   ALT U/L  --   --   --   --  54*   AST U/L  --   --   --   --  33   GLUCOSE mg/dL 127* 98 102*   < > 131*    < > = values in this interval not displayed.     Results from last 7 days   Lab Units 09/12/24  1637 09/12/24  1413   TROPHS ng/L 33* 30*        Transthoracic Echo (TTE) Complete   Final Result      CT angio chest for pulmonary embolism   Final Result   There does not appear to be any evidence of pulmonary embolus or   thoracic aortic aneurysm or dissection.     There is very severe central lobar emphysematous change in the lungs   with an 8 mm  noncalcified subpleural nodule in the inferior left lung   base laterally and some linear scarring or discoid atelectasis in the   posterior left lower lobe but there is no significant alveolar   consolidation and no effusion or pneumothorax..   There is moderately large retrocardiac hiatal hernia.   Signed by Suhail Manjarrez MD      XR chest 2 views   Final Result   1.  Bibasilar opacities, indicative of atelectasis or pneumonia.   2.  COPD.   Signed by Tomas Ugarte MD            Physical Exam    Constitutional   General appearance: Alert and in no acute distress.   Pulmonary   Respiratory assessment: No respiratory distress, normal respiratory rhythm and effort.    Auscultation of Lungs: Clear bilateral breath sounds.   Cardiovascular   Auscultation of heart: Apical pulse normal, heart rate and rhythm normal, normal S1 and S2, 3 x 6 ejection murmur  Exam for edema: No peripheral edema.   Abdomen   Abdominal Exam: No bruits, normal bowel sounds, soft, non-tender, no abdominal mass palpated.    Liver and Spleen exam: No hepato-splenomegaly.   Musculoskeletal   Examination of gait: Normal.    Inspection of digits and nails: No clubbing or cyanosis of the fingernails.    Inspection/palpation of joints, bones and muscles: No joint swelling. Normal movement of all extremities.   Neurologic   Cranial nerves: Nerves 2-12 were intact, no focal neuro defects.          Assessment/Plan      #Acute exacerbation of COPD  #Acute on chronic respiratory failure  #Essential polycythemia  #Elevated right ventricular systolic pressures seen on echo done in December  Continue current treatment  Wean off steroids  Will need home O2 eval    #Pulmonary hypertension/aortic stenosis  We will get a repeat echo in 6 months and do outpatient follow-up     #Anxiety disorder  Resume home medications     #Hypertension  Stable continue home medications     #Dyslipidemia  Stable continue home medications

## 2024-09-16 NOTE — CARE PLAN
The patient's goals for the shift include pt will remain safe and less SOB throughout the shift.    The clinical goals for the shift include pt will be able to wean down on O2 to 1L/min by the end of the shift.

## 2024-09-17 ENCOUNTER — DOCUMENTATION (OUTPATIENT)
Dept: HOME HEALTH SERVICES | Facility: HOME HEALTH | Age: 74
End: 2024-09-17
Payer: MEDICARE

## 2024-09-17 VITALS
DIASTOLIC BLOOD PRESSURE: 75 MMHG | HEIGHT: 62 IN | WEIGHT: 117.5 LBS | HEART RATE: 99 BPM | RESPIRATION RATE: 20 BRPM | SYSTOLIC BLOOD PRESSURE: 146 MMHG | TEMPERATURE: 97.7 F | BODY MASS INDEX: 21.62 KG/M2 | OXYGEN SATURATION: 93 %

## 2024-09-17 LAB
ANION GAP SERPL CALC-SCNC: 8 MMOL/L (ref 10–20)
BASOPHILS # BLD AUTO: 0.01 X10*3/UL (ref 0–0.1)
BASOPHILS NFR BLD AUTO: 0.2 %
BUN SERPL-MCNC: 23 MG/DL (ref 6–23)
CALCIUM SERPL-MCNC: 8 MG/DL (ref 8.6–10.3)
CHLORIDE SERPL-SCNC: 101 MMOL/L (ref 98–107)
CO2 SERPL-SCNC: 29 MMOL/L (ref 21–32)
CREAT SERPL-MCNC: 0.55 MG/DL (ref 0.5–1.05)
EGFRCR SERPLBLD CKD-EPI 2021: >90 ML/MIN/1.73M*2
EOSINOPHIL # BLD AUTO: 0.01 X10*3/UL (ref 0–0.4)
EOSINOPHIL NFR BLD AUTO: 0.2 %
ERYTHROCYTE [DISTWIDTH] IN BLOOD BY AUTOMATED COUNT: 15.2 % (ref 11.5–14.5)
GLUCOSE SERPL-MCNC: 75 MG/DL (ref 74–99)
HCT VFR BLD AUTO: 47.7 % (ref 36–46)
HGB BLD-MCNC: 15.3 G/DL (ref 12–16)
IMM GRANULOCYTES # BLD AUTO: 0.03 X10*3/UL (ref 0–0.5)
IMM GRANULOCYTES NFR BLD AUTO: 0.5 % (ref 0–0.9)
LYMPHOCYTES # BLD AUTO: 0.95 X10*3/UL (ref 0.8–3)
LYMPHOCYTES NFR BLD AUTO: 14.4 %
MCH RBC QN AUTO: 30.6 PG (ref 26–34)
MCHC RBC AUTO-ENTMCNC: 32.1 G/DL (ref 32–36)
MCV RBC AUTO: 95 FL (ref 80–100)
MONOCYTES # BLD AUTO: 0.66 X10*3/UL (ref 0.05–0.8)
MONOCYTES NFR BLD AUTO: 10 %
NEUTROPHILS # BLD AUTO: 4.92 X10*3/UL (ref 1.6–5.5)
NEUTROPHILS NFR BLD AUTO: 74.7 %
NRBC BLD-RTO: 0 /100 WBCS (ref 0–0)
PLATELET # BLD AUTO: 192 X10*3/UL (ref 150–450)
POTASSIUM SERPL-SCNC: 4.2 MMOL/L (ref 3.5–5.3)
RBC # BLD AUTO: 5 X10*6/UL (ref 4–5.2)
SODIUM SERPL-SCNC: 134 MMOL/L (ref 136–145)
WBC # BLD AUTO: 6.6 X10*3/UL (ref 4.4–11.3)

## 2024-09-17 PROCEDURE — 2500000002 HC RX 250 W HCPCS SELF ADMINISTERED DRUGS (ALT 637 FOR MEDICARE OP, ALT 636 FOR OP/ED): Performed by: INTERNAL MEDICINE

## 2024-09-17 PROCEDURE — 2500000005 HC RX 250 GENERAL PHARMACY W/O HCPCS: Performed by: INTERNAL MEDICINE

## 2024-09-17 PROCEDURE — 2500000004 HC RX 250 GENERAL PHARMACY W/ HCPCS (ALT 636 FOR OP/ED): Performed by: INTERNAL MEDICINE

## 2024-09-17 PROCEDURE — 9420000001 HC RT PATIENT EDUCATION 5 MIN

## 2024-09-17 PROCEDURE — 80048 BASIC METABOLIC PNL TOTAL CA: CPT | Performed by: INTERNAL MEDICINE

## 2024-09-17 PROCEDURE — 97530 THERAPEUTIC ACTIVITIES: CPT | Mod: GP

## 2024-09-17 PROCEDURE — 2500000001 HC RX 250 WO HCPCS SELF ADMINISTERED DRUGS (ALT 637 FOR MEDICARE OP): Performed by: INTERNAL MEDICINE

## 2024-09-17 PROCEDURE — 2500000002 HC RX 250 W HCPCS SELF ADMINISTERED DRUGS (ALT 637 FOR MEDICARE OP, ALT 636 FOR OP/ED): Performed by: PHARMACIST

## 2024-09-17 PROCEDURE — 97535 SELF CARE MNGMENT TRAINING: CPT | Mod: GO | Performed by: OCCUPATIONAL THERAPIST

## 2024-09-17 PROCEDURE — 94640 AIRWAY INHALATION TREATMENT: CPT

## 2024-09-17 PROCEDURE — 97165 OT EVAL LOW COMPLEX 30 MIN: CPT | Mod: GO | Performed by: OCCUPATIONAL THERAPIST

## 2024-09-17 PROCEDURE — 97161 PT EVAL LOW COMPLEX 20 MIN: CPT | Mod: GP

## 2024-09-17 PROCEDURE — 85025 COMPLETE CBC W/AUTO DIFF WBC: CPT | Performed by: INTERNAL MEDICINE

## 2024-09-17 PROCEDURE — 36415 COLL VENOUS BLD VENIPUNCTURE: CPT | Performed by: INTERNAL MEDICINE

## 2024-09-17 PROCEDURE — 99233 SBSQ HOSP IP/OBS HIGH 50: CPT | Performed by: INTERNAL MEDICINE

## 2024-09-17 PROCEDURE — 94761 N-INVAS EAR/PLS OXIMETRY MLT: CPT

## 2024-09-17 RX ORDER — PREDNISONE 20 MG/1
40 TABLET ORAL DAILY
Status: DISCONTINUED | OUTPATIENT
Start: 2024-09-18 | End: 2024-09-17 | Stop reason: HOSPADM

## 2024-09-17 RX ORDER — PREDNISONE 20 MG/1
20 TABLET ORAL DAILY
Status: DISCONTINUED | OUTPATIENT
Start: 2024-09-24 | End: 2024-09-17 | Stop reason: HOSPADM

## 2024-09-17 RX ORDER — PREDNISONE 10 MG/1
TABLET ORAL
Qty: 30 TABLET | Refills: 0 | Status: SHIPPED | OUTPATIENT
Start: 2024-09-18 | End: 2024-09-17

## 2024-09-17 RX ORDER — PREDNISONE 10 MG/1
TABLET ORAL
Qty: 30 TABLET | Refills: 0 | Status: SHIPPED | OUTPATIENT
Start: 2024-09-18 | End: 2024-09-30

## 2024-09-17 RX ORDER — PREDNISONE 10 MG/1
10 TABLET ORAL DAILY
Status: DISCONTINUED | OUTPATIENT
Start: 2024-09-27 | End: 2024-09-17 | Stop reason: HOSPADM

## 2024-09-17 ASSESSMENT — COGNITIVE AND FUNCTIONAL STATUS - GENERAL
PERSONAL GROOMING: A LITTLE
MOVING FROM LYING ON BACK TO SITTING ON SIDE OF FLAT BED WITH BEDRAILS: A LITTLE
TOILETING: A LITTLE
MOVING TO AND FROM BED TO CHAIR: A LITTLE
MOBILITY SCORE: 24
STANDING UP FROM CHAIR USING ARMS: A LITTLE
CLIMB 3 TO 5 STEPS WITH RAILING: TOTAL
DRESSING REGULAR LOWER BODY CLOTHING: A LITTLE
WALKING IN HOSPITAL ROOM: A LITTLE
DAILY ACTIVITIY SCORE: 19
MOBILITY SCORE: 16
TURNING FROM BACK TO SIDE WHILE IN FLAT BAD: A LITTLE
DAILY ACTIVITIY SCORE: 24
HELP NEEDED FOR BATHING: A LITTLE
DRESSING REGULAR UPPER BODY CLOTHING: A LITTLE

## 2024-09-17 ASSESSMENT — ACTIVITIES OF DAILY LIVING (ADL)
ADL_ASSISTANCE: INDEPENDENT
ADL_ASSISTANCE: INDEPENDENT
HOME_MANAGEMENT_TIME_ENTRY: 9

## 2024-09-17 ASSESSMENT — PAIN SCALES - GENERAL
PAINLEVEL_OUTOF10: 0 - NO PAIN
PAINLEVEL_OUTOF10: 0 - NO PAIN

## 2024-09-17 ASSESSMENT — PAIN - FUNCTIONAL ASSESSMENT
PAIN_FUNCTIONAL_ASSESSMENT: 0-10
PAIN_FUNCTIONAL_ASSESSMENT: 0-10

## 2024-09-17 NOTE — NURSING NOTE
Discharge instructions provided using teach back method. Pt's health related  risk factors discussed with pt. pt educated to look for any worsening sign and symptoms. Pt educated to seek medical attention if experience any medical emergency. Pt aware to follow up with outpatient clinics as scheduled. Home going meds reviewed with pt. Pt verbalized understanding of disposition and discharge instructions. All questions answered to patient's satisfaction and within nursing scope of practice. Vitals stable, IV removed. Home O2 and walker at beside for dc home

## 2024-09-17 NOTE — DISCHARGE SUMMARY
Discharge Diagnosis  COPD exacerbation (Multi)    Issues Requiring Follow-Up      Test Results Pending At Discharge  Pending Labs       No current pending labs.            Hospital Course  Patient with a past medical history of hypertension dyslipidemia COPD anxiety disorder history of osteoporosis currently on Prolia comes in with worsening shortness of breath loss of appetite and weight loss of about 10 pounds over the last couple of months  In the emergency room she was noted to be severely hypoxic  CT PE was done which was negative for clots but did show bilateral opacities and severe emphysema  Started on high flow oxygen  Patient denies any nausea vomiting or diarrhea  We started the patient on IV antibiotics and steroids for the exacerbation of COPD and acute on chronic respiratory failure  She started steady improvement with improving oxygenation and shortness of breath    Will need home oxygen based on evaluation by respiratory therapist  Will discharge on 3 L of home oxygen along with antibiotics and steroid taper  Discharged in stable condition  Pertinent Physical Exam At Time of Discharge  Physical Exam    Constitutional   General appearance: Alert and in no acute distress.     Pulmonary   Respiratory assessment: No respiratory distress, normal respiratory rhythm and effort.    Auscultation of Lungs: Clear bilateral breath sounds.   Cardiovascular   Auscultation of heart: Apical pulse normal, heart rate and rhythm normal, normal S1 and S2, no murmurs and no pericardial rub.    Exam for edema: No peripheral edema.   Abdomen   Abdominal Exam: No bruits, normal bowel sounds, soft, non-tender, no abdominal mass palpated.    Liver and Spleen exam: No hepato-splenomegaly.   Musculoskeletal   Examination of gait: Normal.    Inspection of digits and nails: No clubbing or cyanosis of the fingernails.    Inspection/palpation of joints, bones and muscles: No joint swelling. Normal movement of all extremities.    Skin   Skin inspection: Normal skin color and pigmentation, normal skin turgor and no visible rash.   Neurologic   Cranial nerves: Nerves 2-12 were intact, no focal neuro defects.    Home Medications     Medication List      START taking these medications     predniSONE 10 mg tablet; Commonly known as: Deltasone; Take 4 tablets   (40 mg) by mouth once daily for 3 days, THEN 3 tablets (30 mg) once daily   for 3 days, THEN 2 tablets (20 mg) once daily for 3 days, THEN 1 tablet   (10 mg) once daily for 3 days. Do not fill before September 18, 2024.;   Start taking on: September 18, 2024     CONTINUE taking these medications     ALPRAZolam 0.5 mg tablet; Commonly known as: Xanax; Take 1 tablet (0.5   mg) by mouth 3 times a day as needed for anxiety for up to 7 days.   amLODIPine 2.5 mg tablet; Commonly known as: Norvasc; TAKE 1 TABLET   DAILY   atorvastatin 20 mg tablet; Commonly known as: Lipitor; TAKE 1 TABLET   DAILY   cyanocobalamin 1,000 mcg tablet; Commonly known as: Vitamin B-12; Take 1   tablet (1,000 mcg) by mouth once daily.   fluticasone 50 mcg/actuation nasal spray; Commonly known as: Flonase;   Administer 2 sprays into each nostril once daily. Shake gently. Before   first use, prime pump. After use, clean tip and replace cap.   omeprazole 20 mg DR capsule; Commonly known as: PriLOSEC; TAKE 1 CAPSULE   DAILY   Prolia 60 mg/mL syringe; Generic drug: denosumab   Trelegy Ellipta 100-62.5-25 mcg blister with device; Generic drug:   fluticasone-umeclidin-vilanter; Inhale 1 puff once daily.   Vitamin D3 25 MCG (1000 UT) tablet; Generic drug: cholecalciferol     STOP taking these medications     gabapentin 100 mg capsule; Commonly known as: Neurontin     ASK your doctor about these medications     meloxicam 15 mg tablet; Commonly known as: Mobic; Take 1 tablet (15 mg)   by mouth once daily.   methocarbamol 500 mg tablet; Commonly known as: Robaxin; Take 1-2 tabs   every 8 hours as needed for spasms        Outpatient Follow-Up  Future Appointments   Date Time Provider Department [place]   11/18/2024  1:00 PM Micheal Arroyo MD [id number] [facility]   12/20/2024  1:00 PM Fabio Chou MD [id number] [facility]     Patient seen at bedside. Events from the last visit reviewed. Discussed with staff. Results of tests and investigations from last visit reviewed and discussed with patient/Family. Electronic chart on [facility] reviewed. Input / Recommendations  from consultants  appreciated and reviewed and agreed with.     discharge summary and profile completed. medications reviewed and discussed with patient and family.  scripts completed and signed.     total discharge time in excess of 30 minutes.    Uriel Gunderson MD

## 2024-09-17 NOTE — PROGRESS NOTES
Humera Villegas is a 74 y.o. female on day 4 of admission presenting with COPD exacerbation (Multi).  Patient with a history of moderate aortic stenosis,HTN, DLP, COPD not on home O2, MOMO, and osteoporosis, who p/w  DOMINGUEZ associated with hypoxia. In the ED was placed on NRB, work up revealed: BNP 700s, bibasilar opacities and diffuse emphysema on chest imaging. Received Solu-Medrol, Mg,  antibiotics and admitted to [facility] for further management. Pulmonary is consulted for COPD, pneumonia and respiratory failure.   Subjective   No acute overnight events. O2 requirements stable at 2-3L      States she feels tired. SOB improved and minimal. +ve cough mostly after eating. Denies wheezing or any pains.   Objective   Scheduled medications  amLODIPine, 2.5 mg, oral, Daily  atorvastatin, 20 mg, oral, Daily  budesonide, 0.5 mg, nebulization, BID  enoxaparin, 40 mg, subcutaneous, q24h JOSEPH  formoterol, 20 mcg, nebulization, BID  gabapentin, 100 mg, oral, TID  ipratropium-albuteroL, 3 mL, nebulization, TID  methylPREDNISolone sodium succinate (PF), 40 mg, intravenous, Daily  oxygen, , inhalation, Continuous - 02/gases  pantoprazole, 40 mg, oral, Daily before breakfast    Continuous medications     PRN medications  PRN medications: ALPRAZolam, alum-mag hydroxide-simeth, ipratropium-albuteroL, oxygen   Physical Exam  Constitutional:       General: She is not in acute distress.     Appearance: She is normal weight. She is not ill-appearing or toxic-appearing.   HENT:      Head: Normocephalic and atraumatic.      Nose:      Comments: On 3L NC     Mouth/Throat:      Mouth: Mucous membranes are moist.      Comments: Mallampati 3.   Eyes:      General: No scleral icterus.     Extraocular Movements: Extraocular movements intact.      Pupils: Pupils are equal, round, and reactive to light.   Cardiovascular:      Rate and Rhythm: Normal rate and regular rhythm.      Heart sounds: No murmur heard.     No friction rub. No gallop.   Pulmonary:       "Effort: No respiratory distress.      Breath sounds: No wheezing or rales.      Comments: Lung clear but with poor air entry.   Abdominal:      General: Bowel sounds are normal. There is no distension.      Palpations: Abdomen is soft.      Tenderness: There is no abdominal tenderness.   Musculoskeletal:         General: No tenderness. Normal range of motion.      Cervical back: Normal range of motion and neck supple. No rigidity or tenderness.      Right lower leg: No edema.      Left lower leg: No edema.   Lymphadenopathy:      Cervical: No cervical adenopathy.   Skin:     General: Skin is warm and dry.      Coloration: Skin is not jaundiced.   Neurological:      General: No focal deficit present.      Mental Status: She is alert and oriented to person, place, and time.      Cranial Nerves: No cranial nerve deficit.      Motor: No weakness.   Psychiatric:         Mood and Affect: Mood normal.         Behavior: Behavior normal.     Last Recorded Vitals  Blood pressure 117/65, pulse 76, temperature 36.2 °C (97.2 °F), temperature source Temporal, resp. rate 16, height 1.575 m (5' 2.01\"), weight 53.3 kg (117 lb 8.1 oz), SpO2 90%.  Intake/Output last 3 Shifts:  I/O last 3 completed shifts:  In: 700 (13.1 mL/kg) [P.O.:600; IV Piggyback:100]  Out: 1000 (18.8 mL/kg) [Urine:1000 (0.5 mL/kg/hr)]  Weight: 53.3 kg   Relevant Results  Results for orders placed or performed during the hospital encounter of 09/12/24 (from the past 24 hour(s))   CBC and Auto Differential   Result Value Ref Range    WBC 7.2 4.4 - 11.3 x10*3/uL    nRBC 0.0 0.0 - 0.0 /100 WBCs    RBC 4.92 4.00 - 5.20 x10*6/uL    Hemoglobin 15.1 12.0 - 16.0 g/dL    Hematocrit 48.7 (H) 36.0 - 46.0 %    MCV 99 80 - 100 fL    MCH 30.7 26.0 - 34.0 pg    MCHC 31.0 (L) 32.0 - 36.0 g/dL    RDW 15.2 (H) 11.5 - 14.5 %    Platelets 188 150 - 450 x10*3/uL    Neutrophils % 86.4 40.0 - 80.0 %    Immature Granulocytes %, Automated 0.6 0.0 - 0.9 %    Lymphocytes % 5.6 13.0 - 44.0 % "    Monocytes % 7.3 2.0 - 10.0 %    Eosinophils % 0.0 0.0 - 6.0 %    Basophils % 0.1 0.0 - 2.0 %    Neutrophils Absolute 6.20 (H) 1.60 - 5.50 x10*3/uL    Immature Granulocytes Absolute, Automated 0.04 0.00 - 0.50 x10*3/uL    Lymphocytes Absolute 0.40 (L) 0.80 - 3.00 x10*3/uL    Monocytes Absolute 0.52 0.05 - 0.80 x10*3/uL    Eosinophils Absolute 0.00 0.00 - 0.40 x10*3/uL    Basophils Absolute 0.01 0.00 - 0.10 x10*3/uL   Basic Metabolic Panel   Result Value Ref Range    Glucose 127 (H) 74 - 99 mg/dL    Sodium 134 (L) 136 - 145 mmol/L    Potassium 4.1 3.5 - 5.3 mmol/L    Chloride 102 98 - 107 mmol/L    Bicarbonate 26 21 - 32 mmol/L    Anion Gap 10 10 - 20 mmol/L    Urea Nitrogen 22 6 - 23 mg/dL    Creatinine 0.53 0.50 - 1.05 mg/dL    eGFR >90 >60 mL/min/1.73m*2    Calcium 8.4 (L) 8.6 - 10.3 mg/dL   No results found.   Assessment/Plan   Assessment & Plan  COPD exacerbation (Multi)    74 YOF with a history of moderate aortic stenosis,HTN, DLP, COPD not on home O2, MOMO, and osteoporosis, who p/w  DOMINGUEZ associated with hypoxia. In the ED was placed on NRB, work up revealed: BNP 700s, bibasilar opacities and diffuse emphysema on chest imaging. Received Solu-Medrol, Mg, antibiotics and admitted to [facility] for further management. Pulmonary is consulted for COPD, pneumonia and respiratory failure.     Respiratory failure: acute with hypoxia, due to below. Now is improving.        Continue supplemental O2, wean off as tolerates       BPH with Acapella, IS       Home O2 evaluation before DC       Management of the individual causes as below     COPD/Emphysema: based on PFT from 2020 that showed: FEV1/VC 39%, FEV1 40%, RV/%, GOLD III with significant air trapping. On Trelegy and albuterol at home. Now likely with acute exacerbation.       Continue budesonide, formoterol and Duo-Neb      Continue systemic steroids, Solumedrol 40 mg daily      Pulmonary FU after DC    Pneumonia: based on CXR that showed bibasilar opacities.  However pro-calcitonin WNL       Completed 3 days of Azithromycin       DCed Ceftriaxone    Pulmonary nodule: 8 mm non calcified LLL subpleural nodule.         FU chest CT in 6 months    Pulmonary HTN: based on echo from 12/2023 that showed RVSP 66, but also HFpEF. Likely group 2 +/- 3.        Supplemental O2       Volume status optimization as per primary team.          DVT prophylaxis: Lovenox    Pulmonary will FU while in house.    Akosua Croft MD

## 2024-09-17 NOTE — CARE PLAN
The patient's goals for the shift include pt will have decrease sob during shift    The clinical goals for the shift include pt will remain safe and stable throughout the  shift    Over the shift, the patient did not make progress toward the following goals. Barriers to progression include . Recommendations to address these barriers include .

## 2024-09-17 NOTE — SIGNIFICANT EVENT
Pt educated on the use of the oxygen tank.  Pt. Also given [employer] brochure and instructed to call [employer] prior to leaving. Pt. Demonstrated a good understanding of home oxygen information.

## 2024-09-17 NOTE — PROGRESS NOTES
Physical Therapy    Physical Therapy Treatment    Patient Name: Humera Villegas  MRN: [id number]  Department: [facility] A 5  Room: [address] [place] [address]  Today's Date: 9/17/2024  Time Calculation  Start Time: 0937  Stop Time: 1000  Time Calculation (min): 23 min         Assessment/Plan   PT Assessment  PT Assessment Results: Decreased endurance, Impaired balance, Decreased mobility, Decreased strength  Rehab Prognosis: Good  Barriers to Discharge: ongoing O2 titration  Evaluation/Treatment Tolerance: Patient limited by fatigue  Medical Staff Made Aware: Yes  Strengths: Ability to acquire knowledge, Attitude of self, Capable of completing ADLs semi/independent, Coping skills, Insight into problems, Premorbid level of function, Support of extended family/friends  Barriers to Participation: Comorbidities  End of Session Communication: Bedside nurse  Assessment Comment: Patient is a pleasant 74 y.o. female who is admitted for COPD exacerbation.  She currently requires 2L O2 at rest and 3L O2 with mobility to provide her with improved activity tolerance.  She will benefit from ongoing PT in house and LOW intensity PT at time of DC to maximize her safety and promote functional I.  End of Session Patient Position: Up in bathroom, Alarm off, caregiver present     PT Plan  Treatment/Interventions: Bed mobility, Transfer training, Gait training, Stair training, Balance training, Strengthening, Endurance training, Therapeutic activity, Positioning, Postural re-education, Home exercise program  PT Plan: Ongoing PT  PT Frequency: 3 times per week  PT Discharge Recommendations: Low intensity level of continued care  Equipment Recommended upon Discharge: Wheeled walker  PT Recommended Transfer Status: Contact guard, Assistive device  PT - OK to Discharge: Yes      General Visit Information:   PT  Visit  PT Received On: 09/17/24  General  Reason for Referral: impaired mobility with admission for DOMINGUEZ with hypoxia  Referred By: Neptali  Past Medical History  Relevant to Rehab:   Past Medical History:   Diagnosis Date    Compression fracture of thoracic vertebra (Multi) 06/04/2012    Formatting of this note might be different from the original. T8    Encounter for screening for malignant neoplasm of colon     Encounter for screening colonoscopy    Encounter for screening for malignant neoplasm of vagina     Vaginal Pap smear    Other conditions influencing health status     Compression fracture    Pain in thoracic spine 06/04/2012    Personal history of other diseases of the digestive system     History of hiatal hernia    Personal history of other endocrine, nutritional and metabolic disease     History of hypercholesterolemia    Personal history of other medical treatment     History of screening mammography    Strain of muscle and tendon of back wall of thorax, initial encounter     Strain of thoracic spine    Wedge compression fracture of unspecified thoracic vertebra, initial encounter for closed fracture (Multi)     Thoracic compression fracture      Family/Caregiver Present: No  Co-Treatment: OT  Co-Treatment Reason: cotreat with OT to maximize safety with functional I, patient's participation d/t low mobility since admission  Prior to Session Communication: Bedside nurse  Patient Position Received: Up in bathroom, Alarm off, caregiver present  Preferred Learning Style: auditory, kinesthetic, verbal  General Comment: Patient on 2LO2 NC with tele monitoring.  Patient desating with movement at EOB - LPN Sara mendoza with increasing O2 to 3L during session.    Subjective   Precautions:  Precautions  Medical Precautions: Oxygen therapy device and L/min, Fall precautions      Vital Signs Comment: during sitting, SpO2 83-87% on 2L for 5 minutes duration with cues for PLB; no improvement and pt with mild DOMINGUEZ; O2 increased to 3L following d/w nurse. SpO2 increased to 90-91% in less than 1 minute. SpO2 decreased from 78% with mobility to 85% after 1-2 minutes seated rest  break on 3L; -118 with activity.     Objective   Pain:  Pain Assessment  Pain Assessment: 0-10  0-10 (Numeric) Pain Score: 0 - No pain  Cognition:  Cognition  Overall Cognitive Status: Within Functional Limits  Coordination:  Movements are Fluid and Coordinated: Yes  Postural Control:  Postural Control  Posture Comment: mild postural instability with mobility, CGA to stabilize, UEs in med guard and stepping strategy  Static Sitting Balance  Static Sitting-Comment/Number of Minutes: supv without UE support in midline 7-8 mins  Dynamic Sitting Balance  Dynamic Sitting-Comments: SBA with/without UEs in midline, good balance  Static Standing Balance  Static Standing-Comment/Number of Minutes: CGA to SBA without UE support, fair+ balance  Dynamic Standing Balance  Dynamic Standing-Comments: CGA with mild instability during gait, fair balance  Extremity/Trunk Assessments:  Cervical Spine   Cervical Spine: Within Functional Limits  Lumbar Spine   Lumbar Spine : Within Functional Limits    RUE   RUE : Within Functional Limits  LUE   LUE: Within Functional Limits  RLE   RLE : Within Functional Limits  LLE   LLE : Within Functional Limits  Activity Tolerance:  Activity Tolerance  Endurance: Decreased tolerance for upright activites, Tolerates 10 - 20 min exercise with multiple rests  Early Mobility/Exercise Safety Screen: Proceed with mobilization - No exclusion criteria met  Activity Tolerance Comments: fair  Rate of Perceived Exertion (RPE): patient with limited breathing difficulty demonstrated despite desating on 2-3L  Treatments:  Therapeutic Activity  Therapeutic Activity Performed: Yes  Therapeutic Activity 1: patient provided with instruction on diaphragmatic excursion and PLB, visual and verbal feedback with pulseox monitoring. postural control, progressive mobility, emphasis on frequency of movement provided.    Ambulation/Gait Training  Ambulation/Gait Training Performed: Yes  Ambulation/Gait Training  1  Surface 1: Level tile  Device 1: No device  Assistance 1: Contact guard, Minimal tactile cues, Moderate verbal cues  Quality of Gait 1: Narrow base of support, Diminished heel strike, Inconsistent stride length, Decreased step length  Comments/Distance (ft) 1: mild instability with turning x1, CGA. ambulated 25 feet (cues for sequencing of mobility, pacing herself and stabilizing)  Transfers  Transfer: Yes  Transfer 1  Technique 1: Sit to stand  Transfer Level of Assistance 1: Contact guard, Moderate verbal cues, Minimal tactile cues  Trials/Comments 1: cues for line management and hand placement. (instruction and sequencing/safety, emphasis on breathing techniques)  Transfers 2  Technique 2: Stand to sit  Transfer Level of Assistance 2: Contact guard, Minimal verbal cues, Minimal tactile cues  Trials/Comments 2: sitting on std. height toilet    Outcome Measures:  [facility] Basic Mobility  Turning from your back to your side while in a flat bed without using bedrails: A little  Moving from lying on your back to sitting on the side of a flat bed without using bedrails: A little  Moving to and from bed to chair (including a wheelchair): A little  Standing up from a chair using your arms (e.g. wheelchair or bedside chair): A little  To walk in hospital room: A little  Climbing 3-5 steps with railing: Total  Basic Mobility - Total Score: 16    Tinetti  Sitting Balance: Steady, safe  Arises: Able, uses arms to help  Attempts to Arise: Able, requires more than one attempt  Immediate Standing Balance (First 5 Seconds): Steady without walker or other support  Standing Balance: Steady but wide stance, uses cane or other support  Nudged: Staggers, grabs, catches self  Eyes Closed: Steady  Turned 360 Degrees: Steadiness: Steady  Turned 360 Degrees: Continuity of Steps: Continuous  Sitting Down: Uses arms or not a smooth motion  Balance Score: 11  Initiation of Gait: No hesitancy  Step Height: R Swing Foot: Right foot complete  clears floor  Step Length: R Swing Foot: Passes left stance foot  Step Height: L Swing Foot: Left foot complete clears floor  Step Length: L Swing Foot: Passes right stance foot  Step Symmetry: Right and left step appear equal  Step Continuity: Steps appear continuous  Path: Mild/moderate deviation or uses walking aid  Trunk: Marked sway or uses walking aid  Walking Time: Heels almost touching while walking  Gait Score: 9  Total Score: 20    Education Documentation  Handouts, taught by Dolly Villegas PT at 9/17/2024 12:26 PM.  Learner: Patient  Readiness: Acceptance  Method: Explanation, Demonstration  Response: Verbalizes Understanding, Demonstrated Understanding    Precautions, taught by Dolly Villegas PT at 9/17/2024 12:26 PM.  Learner: Patient  Readiness: Acceptance  Method: Explanation, Demonstration  Response: Verbalizes Understanding, Demonstrated Understanding    Body Mechanics, taught by Dolly Villegas PT at 9/17/2024 12:26 PM.  Learner: Patient  Readiness: Acceptance  Method: Explanation, Demonstration  Response: Verbalizes Understanding, Demonstrated Understanding    Home Exercise Program, taught by Dolly Villegas PT at 9/17/2024 12:26 PM.  Learner: Patient  Readiness: Acceptance  Method: Explanation, Demonstration  Response: Verbalizes Understanding, Demonstrated Understanding    Mobility Training, taught by Dolly Villegas PT at 9/17/2024 12:26 PM.  Learner: Patient  Readiness: Acceptance  Method: Explanation, Demonstration  Response: Verbalizes Understanding, Demonstrated Understanding    Education Comments  No comments found.        OP EDUCATION:       Encounter Problems       Encounter Problems (Active)       Balance       STG - Maintains dynamic standing balance without upper extremity support (Progressing)       Start:  09/17/24    Expected End:  09/24/24       enhance static and dynamic standing balance for safe completion of daily activities and short household distances with good  balance and independence, >5 minutes           Tinetti (Progressing)       Start:  09/17/24    Expected End:  09/24/24       Increase Tinetti RAS score to >24/28 to indicate low risk of falling.             Mobility       STG - Patient will ambulate (Progressing)       Start:  09/17/24    Expected End:  09/24/24       >500 feet independently in 6 minutes with stable vitals, minimal to no DOMINGUEZ, and normal gait speed.          STG - Patient will ascend and descend a flight of stairs (Progressing)       Start:  09/17/24    Expected End:  09/24/24       With SBA and HR support, stable vitals, safe pattern.            PT Transfers       STG - Patient will transfer sit to and from stand (Progressing)       Start:  09/17/24    Expected End:  09/24/24       Patient will complete all transfers with independence and stable vitals from all surfaces/heights             Pain - Adult

## 2024-09-17 NOTE — PROGRESS NOTES
Physical Therapy                 Therapy Communication Note    Patient Name: Humera Villegas  MRN: [id number]  Department: [place] [address]  Room: [address] [place] [address]  Today's Date: 9/17/2024     Discipline: Physical Therapy    (PT contacted by ALYSSA Ruiz regarding patient to be discharged today and in need for FWW prior to DC.  Paperwork completed and signed by TYRON Perez; walker sized and paperwork signed by patient; filed in [facility] closet.)

## 2024-09-17 NOTE — PROGRESS NOTES
Humera Villegas is a 74 y.o. female on day 5 of admission presenting with COPD exacerbation (Multi).  Patient with a history of moderate aortic stenosis,HTN, DLP, COPD not on home O2, MOMO, and osteoporosis, who p/w  DOMINGUEZ associated with hypoxia. In the ED was placed on NRB, work up revealed: BNP 700s, bibasilar opacities and diffuse emphysema on chest imaging. Received Solu-Medrol, Mg,  antibiotics and admitted to [facility] for further management. Pulmonary is consulted for COPD, pneumonia and respiratory failure.   Subjective   No acute overnight events. O2 requirements stable at 2-3L      Overall is feeling fine. SOB  improved. Still has a cough productive of clear/yellow sputum. No wheezing or any pains.   Objective   Scheduled medications  amLODIPine, 2.5 mg, oral, Daily  atorvastatin, 20 mg, oral, Daily  budesonide, 0.5 mg, nebulization, BID  enoxaparin, 40 mg, subcutaneous, q24h JOSEPH  formoterol, 20 mcg, nebulization, BID  gabapentin, 100 mg, oral, TID  ipratropium-albuteroL, 3 mL, nebulization, TID  methylPREDNISolone sodium succinate (PF), 40 mg, intravenous, Once  oxygen, , inhalation, Continuous - 02/gases  pantoprazole, 40 mg, oral, Daily before breakfast  [START ON 9/18/2024] predniSONE, 40 mg, oral, Daily   Followed by  [START ON 9/21/2024] predniSONE, 30 mg, oral, Daily   Followed by  [START ON 9/24/2024] predniSONE, 20 mg, oral, Daily   Followed by  [START ON 9/27/2024] predniSONE, 10 mg, oral, Daily  [START ON 9/18/2024] predniSONE, 35 mg, oral, Once    Continuous medications     PRN medications  PRN medications: ALPRAZolam, alum-mag hydroxide-simeth, ipratropium-albuteroL, oxygen   Physical Exam  Constitutional:       General: She is not in acute distress.     Appearance: She is normal weight. She is not ill-appearing or toxic-appearing.   HENT:      Head: Normocephalic and atraumatic.      Nose:      Comments: On 3L NC     Mouth/Throat:      Mouth: Mucous membranes are moist.      Comments: Mallampati 3.  "  Eyes:      General: No scleral icterus.     Extraocular Movements: Extraocular movements intact.      Pupils: Pupils are equal, round, and reactive to light.   Cardiovascular:      Rate and Rhythm: Normal rate and regular rhythm.      Heart sounds: No murmur heard.     No friction rub. No gallop.   Pulmonary:      Effort: No respiratory distress.      Breath sounds: No wheezing or rales.      Comments: Lung clear but with poor air entry.   Abdominal:      General: Bowel sounds are normal. There is no distension.      Palpations: Abdomen is soft.      Tenderness: There is no abdominal tenderness.   Musculoskeletal:         General: No tenderness. Normal range of motion.      Cervical back: Normal range of motion and neck supple. No rigidity or tenderness.      Right lower leg: No edema.      Left lower leg: No edema.   Lymphadenopathy:      Cervical: No cervical adenopathy.   Skin:     General: Skin is warm and dry.      Coloration: Skin is not jaundiced.   Neurological:      General: No focal deficit present.      Mental Status: She is alert and oriented to person, place, and time.      Cranial Nerves: No cranial nerve deficit.      Motor: No weakness.   Psychiatric:         Mood and Affect: Mood normal.         Behavior: Behavior normal.     Last Recorded Vitals  Blood pressure 159/78, pulse 100, temperature 36.1 °C (97 °F), resp. rate 18, height 1.575 m (5' 2.01\"), weight 53.3 kg (117 lb 8.1 oz), SpO2 90%.  Intake/Output last 3 Shifts:  I/O last 3 completed shifts:  In: 940 (17.6 mL/kg) [P.O.:840; IV Piggyback:100]  Out: 700 (13.1 mL/kg) [Urine:700 (0.4 mL/kg/hr)]  Weight: 53.3 kg   Relevant Results  Results for orders placed or performed during the hospital encounter of 09/12/24 (from the past 24 hour(s))   CBC and Auto Differential   Result Value Ref Range    WBC 6.6 4.4 - 11.3 x10*3/uL    nRBC 0.0 0.0 - 0.0 /100 WBCs    RBC 5.00 4.00 - 5.20 x10*6/uL    Hemoglobin 15.3 12.0 - 16.0 g/dL    Hematocrit 47.7 (H) " 36.0 - 46.0 %    MCV 95 80 - 100 fL    MCH 30.6 26.0 - 34.0 pg    MCHC 32.1 32.0 - 36.0 g/dL    RDW 15.2 (H) 11.5 - 14.5 %    Platelets 192 150 - 450 x10*3/uL    Neutrophils % 74.7 40.0 - 80.0 %    Immature Granulocytes %, Automated 0.5 0.0 - 0.9 %    Lymphocytes % 14.4 13.0 - 44.0 %    Monocytes % 10.0 2.0 - 10.0 %    Eosinophils % 0.2 0.0 - 6.0 %    Basophils % 0.2 0.0 - 2.0 %    Neutrophils Absolute 4.92 1.60 - 5.50 x10*3/uL    Immature Granulocytes Absolute, Automated 0.03 0.00 - 0.50 x10*3/uL    Lymphocytes Absolute 0.95 0.80 - 3.00 x10*3/uL    Monocytes Absolute 0.66 0.05 - 0.80 x10*3/uL    Eosinophils Absolute 0.01 0.00 - 0.40 x10*3/uL    Basophils Absolute 0.01 0.00 - 0.10 x10*3/uL   Basic Metabolic Panel   Result Value Ref Range    Glucose 75 74 - 99 mg/dL    Sodium 134 (L) 136 - 145 mmol/L    Potassium 4.2 3.5 - 5.3 mmol/L    Chloride 101 98 - 107 mmol/L    Bicarbonate 29 21 - 32 mmol/L    Anion Gap 8 (L) 10 - 20 mmol/L    Urea Nitrogen 23 6 - 23 mg/dL    Creatinine 0.55 0.50 - 1.05 mg/dL    eGFR >90 >60 mL/min/1.73m*2    Calcium 8.0 (L) 8.6 - 10.3 mg/dL   No results found.   Assessment/Plan   Assessment & Plan  COPD exacerbation (Multi)    74 YOF with a history of moderate aortic stenosis,HTN, DLP, COPD not on home O2, MOMO, and osteoporosis, who p/w  DOMINGUEZ associated with hypoxia. In the ED was placed on NRB, work up revealed: BNP 700s, bibasilar opacities and diffuse emphysema on chest imaging. Received Solu-Medrol, Mg, antibiotics and admitted to [facility] for further management. Pulmonary is consulted for COPD, pneumonia and respiratory failure.     Respiratory failure: acute with hypoxia, due to below. Now is improving.        Continue supplemental O2, wean off as tolerates       BPH with Acapella, IS       Home O2 evaluation before DC       Management of the individual causes as below     COPD/Emphysema: based on PFT from 2020 that showed: FEV1/VC 39%, FEV1 40%, RV/%, GOLD III with significant air  trapping. On Trelegy and albuterol at home. Now likely with acute exacerbation.       Continue budesonide, formoterol and Duo-Neb      Continue systemic steroids, would discharge on Prednisone 40 mg daily for 4 more days      Pulmonary FU after DC    Possible pneumonia: based on CXR that showed bibasilar opacities. However pro-calcitonin WNL       Completed 3 days of Azithromycin       DCed Ceftriaxone    Pulmonary nodule: 8 mm non calcified LLL subpleural nodule.         FU chest CT in 6 months    Pulmonary HTN: based on echo from 12/2023 that showed RVSP 66, but also HFpEF. Likely group 2 +/- 3.        Supplemental O2       Volume status optimization as per primary team.          DVT prophylaxis: Lovenox    Pulmonary will FU while in house.    Akosua Croft MD

## 2024-09-17 NOTE — DISCHARGE INSTR - OTHER ORDERS
Pt qualifies for home oxygen, 3 L NC continuous.    Home Health care company is [facility] [phone]

## 2024-09-17 NOTE — PROGRESS NOTES
"Humera Villegas is a 74 y.o. female on day 5 of admission presenting with COPD exacerbation (Multi).    Subjective   Patient alert and oriented. Continues on 3L nc. Patient endorses feeling ready to go home today.  Objective     Physical Exam  Vitals reviewed.   HENT:      Head: Normocephalic.      Right Ear: Tympanic membrane normal.      Nose: Nose normal.      Mouth/Throat:      Mouth: Mucous membranes are moist.   Eyes:      Pupils: Pupils are equal, round, and reactive to light.   Cardiovascular:      Rate and Rhythm: Normal rate.   Pulmonary:      Effort: Pulmonary effort is normal.      Comments: On 3L nc  Abdominal:      General: Abdomen is flat.   Genitourinary:     Comments: deferred  Musculoskeletal:         General: Normal range of motion.      Cervical back: Normal range of motion.   Skin:     General: Skin is warm.      Capillary Refill: Capillary refill takes less than 2 seconds.   Neurological:      General: No focal deficit present.      Mental Status: She is alert and oriented to person, place, and time. Mental status is at baseline.   Psychiatric:         Mood and Affect: Mood normal.         Behavior: Behavior normal.         Thought Content: Thought content normal.         Judgment: Judgment normal.         Last Recorded Vitals  Blood pressure 159/78, pulse 100, temperature 36.1 °C (97 °F), resp. rate 18, height 1.575 m (5' 2.01\"), weight 53.3 kg (117 lb 8.1 oz), SpO2 90%.  Intake/Output last 3 Shifts:  I/O last 3 completed shifts:  In: 940 (17.6 mL/kg) [P.O.:840; IV Piggyback:100]  Out: 700 (13.1 mL/kg) [Urine:700 (0.4 mL/kg/hr)]  Weight: 53.3 kg     Relevant Results  Results for orders placed or performed during the hospital encounter of 09/12/24 (from the past 24 hour(s))   CBC and Auto Differential   Result Value Ref Range    WBC 6.6 4.4 - 11.3 x10*3/uL    nRBC 0.0 0.0 - 0.0 /100 WBCs    RBC 5.00 4.00 - 5.20 x10*6/uL    Hemoglobin 15.3 12.0 - 16.0 g/dL    Hematocrit 47.7 (H) 36.0 - 46.0 %    " MCV 95 80 - 100 fL    MCH 30.6 26.0 - 34.0 pg    MCHC 32.1 32.0 - 36.0 g/dL    RDW 15.2 (H) 11.5 - 14.5 %    Platelets 192 150 - 450 x10*3/uL    Neutrophils % 74.7 40.0 - 80.0 %    Immature Granulocytes %, Automated 0.5 0.0 - 0.9 %    Lymphocytes % 14.4 13.0 - 44.0 %    Monocytes % 10.0 2.0 - 10.0 %    Eosinophils % 0.2 0.0 - 6.0 %    Basophils % 0.2 0.0 - 2.0 %    Neutrophils Absolute 4.92 1.60 - 5.50 x10*3/uL    Immature Granulocytes Absolute, Automated 0.03 0.00 - 0.50 x10*3/uL    Lymphocytes Absolute 0.95 0.80 - 3.00 x10*3/uL    Monocytes Absolute 0.66 0.05 - 0.80 x10*3/uL    Eosinophils Absolute 0.01 0.00 - 0.40 x10*3/uL    Basophils Absolute 0.01 0.00 - 0.10 x10*3/uL   Basic Metabolic Panel   Result Value Ref Range    Glucose 75 74 - 99 mg/dL    Sodium 134 (L) 136 - 145 mmol/L    Potassium 4.2 3.5 - 5.3 mmol/L    Chloride 101 98 - 107 mmol/L    Bicarbonate 29 21 - 32 mmol/L    Anion Gap 8 (L) 10 - 20 mmol/L    Urea Nitrogen 23 6 - 23 mg/dL    Creatinine 0.55 0.50 - 1.05 mg/dL    eGFR >90 >60 mL/min/1.73m*2    Calcium 8.0 (L) 8.6 - 10.3 mg/dL     Assessment/Plan   Assessment & Plan  COPD exacerbation (Multi)  Humera Villegas is a 75 y/o Female PMH: HTN, HLD, COPD (not on home o2), MOMO, osteoporosis (on prolia), presenting to the ED with complaints of hypoxia. Patient endorses that she was being evaluated at her outpatient primary care physicians office and was sent in to the ED for further evaluation of a low pulse ox. On ED arrival pulse ox was 62% on RA. Patient was subsequently placed on 02, admitted for further monitoring of above symptoms, and Pulmonology consult.      #Hypoxia  #COPD exacerbation  #pneumonia-completed 3 days of Azithromycin, ceftriaxone was dcd  #Pulmonary nodule on CT left lateral LL 8mm, noncalcified  -will need repeat CT chest in 6 months for surveillance of pulmonary nodule  -Pulmonary following  -IV solu-medrol transitioned to PO prednisone taper on dc  -will need home 02 prior to dc-3L  nc needed after home o2 eval on dc      #Elevated right ventricular systolic pressures seen on TTE from 12/23  #Moderate aortic stenosis  -Cardiology following  -No need right heart cath at this time per Cardiology  -Will need outpatient surveillance in 6 months      #HTN  #HLD  -Continue home medications including amlodipine 2.5 mg PO daily and atorvastatin 20mg PO daily      #anxiety disorder  -Continue home medications including Xanax 0.5mg PO TID PRN      #DVT prophlaxis  -Lovenox      DC plan:  DC home when medically stable with HHC, and walker. Home o2 eval performed patient will need 3L nc on dc, order placed.  Interdisciplinary rounding completed with Attending Provider, Bedside RN and TCC.  Labs, results and plan of care discussed and reviewed with Dr. Gunderson.            I spent 20 minutes in the professional and overall care of this patient.  Jacquie More, APRN-CNP

## 2024-09-17 NOTE — PROGRESS NOTES
Occupational Therapy    Evaluation/Treatment    Patient Name: Humera Villegas  MRN: [id number]  Department: U A 5  Room: [facility]/533-A  Today's Date: 09/17/24  Time Calculation  Start Time: 0942  Stop Time: 1011  Time Calculation (min): 29 min       Assessment:  OT Assessment: Pt demos decreased balance, strength, endurance and safety awareness resulting in decreased safety and independence with ADLs/IADLs. Pt requires skilled OT services to address above deficits to safely return to OF.  Prognosis: Good  Evaluation/Treatment Tolerance: Patient limited by fatigue  Medical Staff Made Aware: Yes  End of Session Communication: Bedside nurse  End of Session Patient Position: Up in chair, Alarm on  OT Assessment Results: Decreased ADL status, Decreased safe judgment during ADL, Decreased endurance, Decreased functional mobility, Decreased IADLs, Decreased trunk control for functional activities  Prognosis: Good  Evaluation/Treatment Tolerance: Patient limited by fatigue  Medical Staff Made Aware: Yes  Strengths: Premorbid level of function, Support of extended family/friends, Access to adaptive/assistive products, Ability to acquire knowledge, Attitude of self  Barriers to Participation: Comorbidities  Plan:  Treatment Interventions: ADL retraining, Functional transfer training, UE strengthening/ROM, Endurance training, Patient/family training, Equipment evaluation/education, Compensatory technique education  OT Frequency: 2 times per week  OT Discharge Recommendations: Low intensity level of continued care  Equipment Recommended upon Discharge: Wheeled walker  OT Recommended Transfer Status: Assist of 1  OT - OK to Discharge: Yes (OT POC established this date)  Treatment Interventions: ADL retraining, Functional transfer training, UE strengthening/ROM, Endurance training, Patient/family training, Equipment evaluation/education, Compensatory technique education    Subjective     General:   OT Received On:  09/17/24  General  Reason for Referral: Pt is a 75 y/o female admitted for DOMINGUEZ associated with hypoxia.  Referred By: Neptali  Past Medical History Relevant to Rehab:   Past Medical History:   Diagnosis Date    Compression fracture of thoracic vertebra (Multi) 06/04/2012    Formatting of this note might be different from the original. T8    Encounter for screening for malignant neoplasm of colon     Encounter for screening colonoscopy    Encounter for screening for malignant neoplasm of vagina     Vaginal Pap smear    Other conditions influencing health status     Compression fracture    Pain in thoracic spine 06/04/2012    Personal history of other diseases of the digestive system     History of hiatal hernia    Personal history of other endocrine, nutritional and metabolic disease     History of hypercholesterolemia    Personal history of other medical treatment     History of screening mammography    Strain of muscle and tendon of back wall of thorax, initial encounter     Strain of thoracic spine    Wedge compression fracture of unspecified thoracic vertebra, initial encounter for closed fracture (Multi)     Thoracic compression fracture      Family/Caregiver Present: No  Co-Treatment: PT  Co-Treatment Reason: partial cotx with PT to maximize safety and participation with skilled intervention  Prior to Session Communication: Bedside nurse  Patient Position Received: Bed, 3 rail up, Alarm off, not on at start of session  General Comment: Pt supine in bed upon arrival and agreeable to OT Eval. Pt fully participatory. Pt O2 desats with activity, increased supplemental O2 from 2L/min to 3L/min per RN approval. Pt requires prolonged seat rest breaks and PLB to increase SPO2 to >/=90%.  Precautions:  Medical Precautions: Fall precautions, Oxygen therapy device and L/min (3L/min)      Vital Signs Comment: SpO2 83-87% on 2L for 5 minutes duration with cues for PLB; no improvement and pt with mild DOMINGUEZ; O2 increased to 3L  following d/w nurse. SpO2 increased to 90-91% in less than 1 minute. SpO2 decreased from 78% with mobility to 85% after 1-2 minutes seated rest break on 3L and ultimately to 90% with prolonged rest break; -118 with activity.     Pain:  Pain Assessment  Pain Assessment: 0-10  0-10 (Numeric) Pain Score: 0 - No pain    Objective   Cognition:  Overall Cognitive Status: Within Functional Limits  Orientation Level: Oriented X4           Home Living:  Type of Home: House  Lives With: Alone  Home Adaptive Equipment: None  Home Layout: Multi-level, Stairs to alternate level with rails, 1/2 bath on main level  Alternate Level Stairs-Rails:  (single HR support)  Alternate Level Stairs-Number of Steps: 12  Home Access: Stairs to enter without rails  Entrance Stairs-Rails: None  Entrance Stairs-Number of Steps: 1  Bathroom Shower/Tub: Walk-in shower  Bathroom Toilet:  (two toilets comfort height and one toilet standard height)  Bathroom Equipment: Raised toilet seat without rails  Prior Function:  Level of [place]: Independent with ADLs and functional transfers, Independent with homemaking with ambulation  Receives Help From: Family, Friends  ADL Assistance: Independent  Homemaking Assistance: Independent  Ambulatory Assistance: Independent (no AD)  Prior Function Comments: pt denies falls       ADL:  Eating Assistance: Independent  Grooming Assistance: Stand by  Grooming Deficit: Wash/dry hands, Verbal cueing, Supervision/safety  UE Dressing Deficit: Setup (to don/doff gown)  LE Dressing Assistance: Stand by  LE Dressing Deficit: Don/doff R sock, Don/doff L sock  Toileting Assistance with Device:  (CGA)  Toileting Deficit: Verbal cueing, Supervison/safety, Steadying  Activities of Daily Living:      Grooming  Grooming Level of Assistance: Close supervision, Minimal verbal cues  Grooming Where Assessed:  (standing)  Grooming Comments: pt performed hand hygiene    UE Dressing  UE Dressing Level of Assistance: Setup  UE  Dressing Where Assessed: Chair  UE Dressing Comments: to don/doff gown    LE Dressing  LE Dressing: Yes  Sock Level of Assistance: Close supervision, Minimal verbal cues  LE Dressing Where Assessed: Chair  LE Dressing Comments: don/doff socks    Toileting  Toileting Level of Assistance: Contact guard, Minimal verbal cues  Where Assessed: Toilet  Toileting Comments: steadying assist, cues for safety  Activity Tolerance:  Endurance: Tolerates 10 - 20 min exercise with multiple rests  Activity Tolerance Comments: O2 desats with activity, seated rest breaks and cues for PLB       Bed Mobility/Transfers: Bed Mobility  Bed Mobility: Yes  Bed Mobility 1  Bed Mobility 1: Supine to sitting  Level of Assistance 1: Contact guard, Minimal verbal cues  Bed Mobility Comments 1: HOB elevated    Transfers  Transfer: Yes  Transfer 1  Technique 1: Sit to stand, Stand to sit  Transfer Device 1:  (no AD)  Transfer Level of Assistance 1: Contact guard, Close supervision, Minimal verbal cues  Trials/Comments 1: initially CGA but progressed to SBA, cues for sequencing and safe hand placement    Toilet Transfers  Toilet Transfer Type: To and from  Toilet Transfer to: Raised toilet seat with rails  Toilet Transfer Technique: Ambulating  Toilet Transfers: Supervision  Toilet Transfers Comments: cues for safe hand placement and sequencing    Functional Mobility:  Functional Mobility  Functional Mobility Performed: Yes  Functional Mobility 1  Comments 1: Pt functionally navigated x min household distance in room using no AD with SBA-CGA for balance. Cues for safety awareness.  Sitting Balance:  Static Sitting Balance  Static Sitting-Balance Support: Bilateral upper extremity supported, Feet supported  Static Sitting-Level of Assistance: Distant supervision  Static Sitting-Comment/Number of Minutes: at EOB  Dynamic Sitting Balance  Dynamic Sitting-Comments: SUP at EOB  Standing Balance:  Static Standing Balance  Static Standing-Balance Support:  Right upper extremity supported  Static Standing-Level of Assistance: Close supervision  Dynamic Standing Balance  Dynamic Standing-Comments: SBA-CGA      Sensation:  Light Touch: No apparent deficits  Strength:  Strength Comments: B UEs grossly WFL    Coordination:  Movements are Fluid and Coordinated: Yes        Extremities: RUE   RUE : Within Functional Limits and LUE   LUE: Within Functional Limits      Outcome Measures: [facility] Daily Activity  Putting on and taking off regular lower body clothing: A little  Bathing (including washing, rinsing, drying): A little  Putting on and taking off regular upper body clothing: A little  Toileting, which includes using toilet, bedpan or urinal: A little  Taking care of personal grooming such as brushing teeth: A little  Eating Meals: None  Daily Activity - Total Score: 19        Education Documentation  Body Mechanics, taught by Michelle Resendez OT at 9/17/2024 12:55 PM.  Learner: Patient  Readiness: Acceptance  Method: Explanation  Response: Verbalizes Understanding    Precautions, taught by Michelle Resendez OT at 9/17/2024 12:55 PM.  Learner: Patient  Readiness: Acceptance  Method: Explanation  Response: Verbalizes Understanding    ADL Training, taught by Michelle Resendez OT at 9/17/2024 12:55 PM.  Learner: Patient  Readiness: Acceptance  Method: Explanation  Response: Verbalizes Understanding    Education Comments  No comments found.             Goals:  Encounter Problems       Encounter Problems (Active)       ADLs       Patient will perform UB and LB bathing with modified independent level of assistance and grab bars, shower chair, and long-handled sponge.       Start:  09/17/24    Expected End:  10/01/24            Patient with complete lower body dressing with modified independent level of assistance donning and doffing all LE clothes  with PRN adaptive equipment.       Start:  09/17/24    Expected End:  10/01/24            Patient will complete toileting including  hygiene clothing management/hygiene with modified independent level of assistance and raised toilet seat and grab bars.       Start:  09/17/24    Expected End:  10/01/24               MOBILITY       Patient will perform Functional mobility x Household distances/Community Distances with modified independent level of assistance and least restrictive device in order to improve safety and functional mobility.       Start:  09/17/24    Expected End:  10/01/24               TRANSFERS       Patient will perform bed mobility independent level of assistance in order to improve safety and independence with mobility       Start:  09/17/24    Expected End:  10/01/24            Patient will complete functional transfers with least restrictive device with modified independent level of assistance.       Start:  09/17/24    Expected End:  10/01/24

## 2024-09-18 ENCOUNTER — PATIENT OUTREACH (OUTPATIENT)
Dept: PRIMARY CARE | Facility: CLINIC | Age: 74
End: 2024-09-18
Payer: MEDICARE

## 2024-09-18 NOTE — PROGRESS NOTES
Discharge Facility: [facility]  Discharge Diagnosis: COPD exacerbation  Admission Date:  9/12/24  Discharge Date:  9/17/24    PCP Appointment Date: TBD  Specialist Appointment Date:  n/a  Hospital Encounter and Summary Linked: Yes  See discharge assessment below for further details     Engagement  Call Start Time: 1400 (9/18/2024  2:25 PM)    Medications  Medications reviewed with patient/caregiver?: Yes (9/18/2024  2:25 PM)  Is the patient having any side effects they believe may be caused by any medication additions or changes?: No (9/18/2024  2:25 PM)  Does the patient have all medications ordered at discharge?: Yes (9/18/2024  2:25 PM)  Care Management Interventions: No intervention needed (9/18/2024  2:25 PM)  Is the patient taking all medications as directed (includes completed medication regime)?: Yes (9/18/2024  2:25 PM)  Medication Comments: Medications reviewed with patient. See medication list. New med: prednisone Stopped: gabapentin (9/18/2024  2:25 PM)    Appointments  Does the patient have a primary care provider?: Yes (9/18/2024  2:25 PM)  Care Management Interventions: Advised patient to make appointment (9/18/2024  2:25 PM)  Has the patient kept scheduled appointments due by today?: Not applicable (9/18/2024  2:25 PM)  Care Management Interventions: -- (Offered to schedule PCP appt. Patient declined to schedule at this time as she has to find a ride first. Provided her with dates/times of avaialble appts and she will call myself or Dr. Chou's office back to schedule) (9/18/2024  2:25 PM)    Self Management  What is the home health agency?:  Home Care received referral but was unable to accept, per note in Epic. Will notify PCP of this. (9/18/2024  2:25 PM)  Has home health visited the patient within 72 hours of discharge?: Not applicable (9/18/2024  2:25 PM)  What Durable Medical Equipment (DME) was ordered?: walker (9/18/2024  2:25 PM)  Has all Durable Medical Equipment (DME) been  delivered?: Yes (9/18/2024  2:25 PM)    Patient Teaching  Does the patient have access to their discharge instructions?: Yes (9/18/2024  2:25 PM)  Care Management Interventions: Reviewed instructions with patient (9/18/2024  2:25 PM)  What is the patient's perception of their health status since discharge?: Improving (9/18/2024  2:25 PM)  Is the patient/caregiver able to teach back the hierarchy of who to call/visit for symptoms/problems? PCP, Specialist, Home Health nurse, Urgent Care, ED, 911: Yes (9/18/2024  2:25 PM)  Patient/Caregiver Education Comments: Educated on oxygen safety. Importance of wearing oxygen continuously unless provider okays her to take it off. She has a pulse ox and educated on use of this as well as continuing to use her incentive spirometer.  Educated on home safety. She wants to get in the shower and wash her hair also. She was planning to take the oxygen off while doing this and was encouraged not to do this as her body will require more oxygen for these types of activities. She does not have a shower chair and states her shower would not be able to accommodate one.  Encouraged her to have a friend or family member come over while she is showering as she also has to walk up 12 steps to get to the shower.  She is in agreement with this. (9/18/2024  2:25 PM)    Wrap Up  Is the patient/caregiver familiar with Advance Care Planning?: Yes (9/18/2024  2:25 PM)  Would the patient like more information on Advance Care Planning?: No (9/18/2024  2:25 PM)  Wrap Up Additional Comments: Successful transition of care outreach with patient. Patient reports doing well at home since discharge. New meds/changes reviewed with patient during outreach. Patient denies CP/SOB. Patient denies further discharge questions/concerns/needs at time of outreach call. Emphasized that follow up appts are needed after discharge with PCP and reviewed needed follow ups with any specialties to assess response to treatment  from hospitalization. Patient aware of my availability for non-emergent concerns. Contact information provided to the patient. Patient will call myself or PCP office directly to schedule appt once she is able to find transportation. (9/18/2024  2:25 PM)

## 2024-09-19 NOTE — DOCUMENTATION CLARIFICATION NOTE
"    PATIENT:               SUSANNA LOWE  ACCT #:                  [id number]  MRN:                       [id number]  :                       1950  ADMIT DATE:       2024 2:01 PM  DISCH DATE:        2024 6:48 PM  RESPONDING PROVIDER #:        [id number]          PROVIDER RESPONSE TEXT:    I agree with dietician diagnosis of severe malnutrition on 9/15/24.    CDI QUERY TEXT:    Clarification    Instruction:    Based on your assessment of the patient and the clinical information, please provide the requested documentation by clicking on the appropriate radio button and enter any additional information if prompted.    Question: Please further clarify this patient nutritional status as    When answering this query, please exercise your independent professional judgment. The fact that a question is being asked, does not imply that any particular answer is desired or expected.    The patient's clinical indicators include:  Clinical Information: 73 y/o female presented with SOB, hypoxia, decreased appetite, weight loss.    Clinical Indicators: per Dietician consult 9/15/24: ?Severe malnutrition related to chronic disease or condition as evidenced by: moderate/severely depleted adipose tissues and skeletal tissue wasting per observation, oral intake less than 75 percent of estimated energy requirements for greater than one month related to compromised respiratory status?    Pulmonology consult 24: \"She reports overall increased fatigue over the past several months with reduced appetite and    4 pound weight loss.\" \"Malnutrition: Patient reports increased fatigue with decrease activity and poor appetite with 4 pound weight loss over the last few months; BMI low end of normal at 19.5\"    BMI  19.57, 9/15 21.49    Treatment: regular diet along with ensure plus TID with meals, recommendation for MVI once daily    Risk Factors: emphysema, age, pneumonia  Options provided:  -- I agree with dietician diagnosis " of severe malnutrition on 9/15/24.  -- Other - I will add my own diagnosis  -- Refer to Clinical Documentation Reviewer    Query created by: Oma Pacheco on 9/17/2024 10:58 AM      Electronically signed by:  NAFISA RUIZ MD 9/19/2024 6:43 AM

## 2024-09-24 ENCOUNTER — APPOINTMENT (OUTPATIENT)
Dept: PRIMARY CARE | Facility: CLINIC | Age: 74
End: 2024-09-24
Payer: MEDICARE

## 2024-09-24 VITALS
DIASTOLIC BLOOD PRESSURE: 72 MMHG | RESPIRATION RATE: 18 BRPM | BODY MASS INDEX: 19.97 KG/M2 | SYSTOLIC BLOOD PRESSURE: 137 MMHG | OXYGEN SATURATION: 96 % | HEART RATE: 104 BPM | WEIGHT: 109.2 LBS

## 2024-09-24 DIAGNOSIS — R53.81 PHYSICAL DECONDITIONING: ICD-10-CM

## 2024-09-24 DIAGNOSIS — S31.000A WOUND OF SACRAL REGION, INITIAL ENCOUNTER: ICD-10-CM

## 2024-09-24 DIAGNOSIS — E78.00 ELEVATED LDL CHOLESTEROL LEVEL: ICD-10-CM

## 2024-09-24 DIAGNOSIS — E46 PROTEIN-CALORIE MALNUTRITION, UNSPECIFIED SEVERITY (MULTI): ICD-10-CM

## 2024-09-24 DIAGNOSIS — I10 BENIGN ESSENTIAL HYPERTENSION: ICD-10-CM

## 2024-09-24 DIAGNOSIS — J43.2 CENTRILOBULAR EMPHYSEMA (MULTI): Primary | ICD-10-CM

## 2024-09-24 DIAGNOSIS — I35.0 MODERATE AORTIC STENOSIS: ICD-10-CM

## 2024-09-24 DIAGNOSIS — R01.1 SYSTOLIC EJECTION MURMUR: ICD-10-CM

## 2024-09-24 PROCEDURE — 3078F DIAST BP <80 MM HG: CPT | Performed by: FAMILY MEDICINE

## 2024-09-24 PROCEDURE — 1111F DSCHRG MED/CURRENT MED MERGE: CPT | Performed by: FAMILY MEDICINE

## 2024-09-24 PROCEDURE — 3075F SYST BP GE 130 - 139MM HG: CPT | Performed by: FAMILY MEDICINE

## 2024-09-24 PROCEDURE — 99496 TRANSJ CARE MGMT HIGH F2F 7D: CPT | Performed by: FAMILY MEDICINE

## 2024-09-24 PROCEDURE — 1159F MED LIST DOCD IN RCRD: CPT | Performed by: FAMILY MEDICINE

## 2024-09-24 PROCEDURE — 1123F ACP DISCUSS/DSCN MKR DOCD: CPT | Performed by: FAMILY MEDICINE

## 2024-09-24 RX ORDER — ALBUTEROL SULFATE 90 UG/1
2 INHALANT RESPIRATORY (INHALATION) EVERY 4 HOURS PRN
Qty: 8 G | Refills: 11 | Status: SHIPPED | OUTPATIENT
Start: 2024-09-24 | End: 2025-09-24

## 2024-09-24 NOTE — PROGRESS NOTES
"Patient: Humera Villegas  : 1950  PCP: Fabio Chou MD  MRN: [id number]  Program: No linked episodes    Admit date: 24  Discharge date:28  Discharge DX: COPD exacerbation Acute respiratory failure.     Humera Villegas is a 74 y.o. female presenting today for follow-up after being discharged from the hospital 6 days ago. The main problem requiring admission was . The discharge summary and/or Transitional Care Management documentation was reviewed. Medication reconciliation was performed as indicated via the \"Antoni as Reviewed\" timestamp.     Humera Villegas was contacted by Transitional Care Management services two days after her discharge. This encounter and supporting documentation was reviewed.    The complexity of medical decision making for this patient's transitional care is high.    HPI     Hospital course copied:   Humera Villegas is a 75 y/o Female PMH: HTN, HLD, COPD (not on home o2), MOMO, osteoporosis (on prolia), presenting to the ED with complaints of hypoxia. Patient endorses that she was being evaluated at her outpatient primary care physicians office and was sent in to the ED for further evaluation of a low pulse ox. On ED arrival pulse ox was 62% on RA. Patient was subsequently placed on 02, admitted for further monitoring of above symptoms, and Pulmonology consult.   #Hypoxia  #COPD exacerbation  #pneumonia-completed 3 days of Azithromycin, ceftriaxone was dcd  #Pulmonary nodule on CT left lateral LL 8mm, noncalcified  -will need repeat CT chest in 6 months for surveillance of pulmonary nodule  -Pulmonary following  -IV solu-medrol transitioned to PO prednisone taper on dc  -will need home 02 prior to dc-3L nc needed after home o2 eval on dc   #Elevated right ventricular systolic pressures seen on TTE from   #Moderate aortic stenosis  -Cardiology following  -No need right heart cath at this time per Cardiology  -Will need outpatient surveillance in 6 months   #HTN  #HLD  -Continue home " medications including amlodipine 2.5 mg PO daily and atorvastatin 20mg PO daily   #anxiety disorder  -Continue home medications including Xanax 0.5mg PO TID PRN   #DVT prophlaxis  -Lovenox  Labs copied:    Imaging copied:     Review of Systems  SOB FATIGUE WEAKNESS  Physical Exam  HENT:      Head: Normocephalic.      Right Ear: Tympanic membrane normal.      Nose: Nose normal.      Mouth/Throat:      Mouth: Mucous membranes are moist.   Eyes:      Pupils: Pupils are equal, round, and reactive to light.   Cardiovascular:      Rate and Rhythm: Normal rate.   Pulmonary:      Effort: Pulmonary effort is normal.      Comments: On 3L nc  Abdominal:      General: Abdomen is flat.   Genitourinary:     Comments: deferred  Musculoskeletal:         General: Normal range of motion.      Cervical back: Normal range of motion.   Skin:     General: Skin is warm.      Capillary Refill: Capillary refill takes less than 2 seconds.   Neurological:      General: No focal deficit present.      Mental Status: She is alert and oriented to person, place, and time. Mental status is at baseline.   Psychiatric:         Mood and Affect: Mood normal.         Behavior: Behavior normal.         Thought Content: Thought content normal.         Judgment: Judgment normal.      Problem List Items Addressed This Visit       Benign essential hypertension    Current Assessment & Plan     Patient's blood pressure is at goal of 130/85 or less. Condition is stable. Continue current medications and treatment plan.  I recommend that you exercise for 30-45 minutes 5 days a week.  I also recommend a balanced diet with fruits and vegetables every day, lean meats, and little fried foods. The DASH diet (you can find this online) is a good example of this.           COPD (chronic obstructive pulmonary disease) (Multi) - Primary    Current Assessment & Plan     PATIENT WILL FINISH sTEROIDS.  C/w O2          Relevant Medications    albuterol (Ventolin HFA) 90  "mcg/actuation inhaler    Elevated LDL cholesterol level    Current Assessment & Plan     Check lipid panel continue medications continue healthy eating work out  150 minutes a week  C/w atorvastatin         Systolic ejection murmur    Protein-calorie malnutrition, unspecified severity (Multi)    Current Assessment & Plan     Patient does not have PEM.          Moderate aortic stenosis    Current Assessment & Plan     Possibly causing SOB         Sacral wound    Relevant Medications    silicone,dressing-foam bandage 9 X 9 \" bandage    Physical deconditioning    Current Assessment & Plan     WILL START hhc ot/pt         COPD exacerbation (Multi)  Humera Villegas is a 73 y/o Female PMH: HTN, HLD, COPD (not on home o2), MOMO, osteoporosis (on prolia), presenting to the ED with complaints of hypoxia. Patient endorses that she was being evaluated at her outpatient primary care physicians office and was sent in to the ED for further evaluation of a low pulse ox. On ED arrival pulse ox was 62% on RA. Patient was subsequently placed on 02, admitted for further monitoring of above symptoms, and Pulmonology consult.        #Hypoxia  #COPD exacerbation  #pneumonia-completed 3 days of Azithromycin, ceftriaxone was dcd  #Pulmonary nodule on CT left lateral LL 8mm, noncalcified  -will need repeat CT chest in 6 months for surveillance of pulmonary nodule  -Pulmonary following  -IV solu-medrol transitioned to PO prednisone taper on dc  -will need home 02 prior to dc-3L nc needed after home o2 eval on dc        #Elevated right ventricular systolic pressures seen on TTE from 12/23  #Moderate aortic stenosis  -Cardiology following  -No need right heart cath at this time per Cardiology  -Will need outpatient surveillance in 6 months        #HTN  #HLD  -Continue home medications including amlodipine 2.5 mg PO daily and atorvastatin 20mg PO daily        #anxiety disorder  -Continue home medications including Xanax 0.5mg PO TID PRN        #DVT " prophlaxis  -Lovenox       Family History   Problem Relation Name Age of Onset    Other (cardiac disorder) Mother      Diabetes Mother      Other (cardiac disorder) Father         No data recorded    No follow-ups on file.

## 2024-09-25 ENCOUNTER — TELEPHONE (OUTPATIENT)
Dept: PRIMARY CARE | Facility: CLINIC | Age: 74
End: 2024-09-25
Payer: MEDICARE

## 2024-09-25 DIAGNOSIS — J43.9 PULMONARY EMPHYSEMA, UNSPECIFIED EMPHYSEMA TYPE (MULTI): Primary | ICD-10-CM

## 2024-09-25 NOTE — TELEPHONE ENCOUNTER
I still wasn't able to fax over the printed info to [facility]. I'm still missing the home care order       Fax: [phone]

## 2024-09-29 ENCOUNTER — HOME HEALTH ADMISSION (OUTPATIENT)
Dept: HOME HEALTH SERVICES | Facility: HOME HEALTH | Age: 74
End: 2024-09-29
Payer: MEDICARE

## 2024-09-29 ENCOUNTER — DOCUMENTATION (OUTPATIENT)
Dept: HOME HEALTH SERVICES | Facility: HOME HEALTH | Age: 74
End: 2024-09-29
Payer: MEDICARE

## 2024-09-29 PROBLEM — R53.81 PHYSICAL DECONDITIONING: Status: ACTIVE | Noted: 2024-09-29

## 2024-09-29 PROBLEM — S31.000A SACRAL WOUND: Status: ACTIVE | Noted: 2024-09-29

## 2024-09-29 NOTE — HH CARE COORDINATION
Home Care received a Referral for Nursing, Physical Therapy, and Occupational Therapy. We have processed the referral for a Start of Care on 10/02/2024.     If you have any questions or concerns, please feel free to contact us at [phone]. Follow the prompts, enter your five digit zip code, and you will be directed to your care team on [facility].

## 2024-10-03 ENCOUNTER — HOME CARE VISIT (OUTPATIENT)
Dept: HOME HEALTH SERVICES | Facility: HOME HEALTH | Age: 74
End: 2024-10-03
Payer: MEDICARE

## 2024-10-03 PROCEDURE — 169592 NO-PAY CLAIM PROCEDURE

## 2024-10-03 PROCEDURE — G0299 HHS/HOSPICE OF RN EA 15 MIN: HCPCS | Mod: HHH

## 2024-10-03 SDOH — ECONOMIC STABILITY: HOUSING INSECURITY: EVIDENCE OF SMOKING MATERIAL: 0

## 2024-10-03 SDOH — HEALTH STABILITY: MENTAL HEALTH: SMOKING IN HOME: 0

## 2024-10-03 ASSESSMENT — ENCOUNTER SYMPTOMS
PAIN: 1
APPETITE LEVEL: FAIR
CHANGE IN APPETITE: UNCHANGED
LAST BOWEL MOVEMENT: 67116
PAIN LOCATION: BACK
MUSCLE WEAKNESS: 1
PERSON REPORTING PAIN: PATIENT

## 2024-10-03 ASSESSMENT — ACTIVITIES OF DAILY LIVING (ADL)
CURRENT_FUNCTION: ONE PERSON
AMBULATION ASSISTANCE: ONE PERSON
ENTERING_EXITING_HOME: MODERATE ASSIST
OASIS_M1830: 03

## 2024-10-03 ASSESSMENT — LIFESTYLE VARIABLES: SMOKING_STATUS: 0

## 2024-10-04 ENCOUNTER — HOME CARE VISIT (OUTPATIENT)
Dept: HOME HEALTH SERVICES | Facility: HOME HEALTH | Age: 74
End: 2024-10-04
Payer: MEDICARE

## 2024-10-04 VITALS
OXYGEN SATURATION: 96 % | TEMPERATURE: 98.3 F | SYSTOLIC BLOOD PRESSURE: 98 MMHG | DIASTOLIC BLOOD PRESSURE: 66 MMHG | HEART RATE: 85 BPM

## 2024-10-04 PROCEDURE — G0152 HHCP-SERV OF OT,EA 15 MIN: HCPCS | Mod: HHH

## 2024-10-04 SDOH — HEALTH STABILITY: MENTAL HEALTH: SMOKING IN HOME: 0

## 2024-10-04 SDOH — ECONOMIC STABILITY: HOUSING INSECURITY: EVIDENCE OF SMOKING MATERIAL: 0

## 2024-10-04 ASSESSMENT — ACTIVITIES OF DAILY LIVING (ADL)
DRESSING_LB_CURRENT_FUNCTION: INDEPENDENT
WASHING_LB_CURRENT_FUNCTION: SUPERVISION
WASHING_UPB_CURRENT_FUNCTION: SUPERVISION
DRESSING_UB_CURRENT_FUNCTION: INDEPENDENT

## 2024-10-04 ASSESSMENT — ENCOUNTER SYMPTOMS
DENIES PAIN: 1
PERSON REPORTING PAIN: PATIENT

## 2024-10-05 ENCOUNTER — HOME CARE VISIT (OUTPATIENT)
Dept: HOME HEALTH SERVICES | Facility: HOME HEALTH | Age: 74
End: 2024-10-05
Payer: MEDICARE

## 2024-10-05 VITALS
SYSTOLIC BLOOD PRESSURE: 108 MMHG | RESPIRATION RATE: 19 BRPM | TEMPERATURE: 98.8 F | OXYGEN SATURATION: 100 % | HEART RATE: 89 BPM | DIASTOLIC BLOOD PRESSURE: 60 MMHG

## 2024-10-05 PROCEDURE — G0151 HHCP-SERV OF PT,EA 15 MIN: HCPCS | Mod: HHH

## 2024-10-05 SDOH — ECONOMIC STABILITY: FOOD INSECURITY: MEALS PER DAY: 3

## 2024-10-05 SDOH — HEALTH STABILITY: MENTAL HEALTH: SMOKING IN HOME: 0

## 2024-10-05 SDOH — HEALTH STABILITY: PHYSICAL HEALTH: EXERCISE TYPE: LE STRENGTHENING

## 2024-10-05 SDOH — ECONOMIC STABILITY: HOUSING INSECURITY: EVIDENCE OF SMOKING MATERIAL: 0

## 2024-10-05 SDOH — HEALTH STABILITY: PHYSICAL HEALTH
EXERCISE COMMENTS: ISSUED HO AND BEGAN HEP INSTRUCTION TODAY.. SITTING EXS BIL.. MAKING SURE PATIENT IS NOT HOLDING HER BREATH  BEGINNING WITH 10 REPS WORKING TOWARDS 2 X 10  LAQ, MARCHING IN PLACE AND DFPF.

## 2024-10-05 ASSESSMENT — BALANCE ASSESSMENTS
SITTING BALANCE: 1 - STEADY, SAFE
ATTEMPTS TO ARISE: 2 - ABLE TO RISE, ONE ATTEMPT
ARISING SCORE: 1
NUDGED SCORE: 2
BALANCE SCORE: 13
TURNING 360 DEGREES STEPS: 1 - CONTINUOUS STEPS
SITTING DOWN: 1 - USES ARMS OR NOT SMOOTH MOTION
STANDING BALANCE: 2 - NARROW STANCE WITHOUT SUPPORT
IMMEDIATE STANDING BALANCE FIRST 5 SECONDS: 2 - STEADY WITHOUT WALKER OR OTHER SUPPORT
ARISES: 1 - ABLE, USES ARMS TO HELP
NUDGED: 2 - STEADY
EYES CLOSED AT MAXIMUM POSITION NUDGED: 0 - UNSTEADY

## 2024-10-05 ASSESSMENT — ENCOUNTER SYMPTOMS
PAIN LOCATION - PAIN FREQUENCY: INTERMITTENT
MUSCLE WEAKNESS: 1
OCCASIONAL FEELINGS OF UNSTEADINESS: 0
HIGHEST PAIN SEVERITY IN PAST 24 HOURS: 0/10
PAIN SEVERITY GOAL: 0/10
PAIN LOCATION - PAIN SEVERITY: 0/10
DEPRESSION: 0
PAIN LOCATION - PAIN QUALITY: ACHY
PERSON REPORTING PAIN: PATIENT
LOWEST PAIN SEVERITY IN PAST 24 HOURS: 0/10
DENIES PAIN: 1
LOSS OF SENSATION IN FEET: 0
SUBJECTIVE PAIN PROGRESSION: UNCHANGED

## 2024-10-05 ASSESSMENT — GAIT ASSESSMENTS
PATH: 2 - STRAIGHT WITHOUT WALKING AID
GAIT SCORE: 11
STEP SYMMETRY: 1 - RIGHT AND LEFT STEP LENGTH APPEAR EQUAL
TRUNK: 1 - NO SWAY BUT FLEXION OF KNEES OR BACK OR SPREADS ARMS WHILE WALKING
BALANCE AND GAIT SCORE: 24
INITIATION OF GAIT IMMEDIATELY AFTER GO: 1 - NO HESITANCY
PATH SCORE: 2
STEP CONTINUITY: 1 - STEPS APPEAR CONTINUOUS
WALKING STANCE: 1 - HEELS ALMOST TOUCHING WHILE WALKING
TRUNK SCORE: 1

## 2024-10-05 ASSESSMENT — PAIN SCALES - PAIN ASSESSMENT IN ADVANCED DEMENTIA (PAINAD)
CONSOLABILITY: 0
CONSOLABILITY: 0 - NO NEED TO CONSOLE.
BREATHING: 0
FACIALEXPRESSION: 0
TOTALSCORE: 0
NEGVOCALIZATION: 0
FACIALEXPRESSION: 0 - SMILING OR INEXPRESSIVE.
BODYLANGUAGE: 0 - RELAXED.
NEGVOCALIZATION: 0 - NONE.
BODYLANGUAGE: 0

## 2024-10-05 ASSESSMENT — ACTIVITIES OF DAILY LIVING (ADL)
CURRENT_FUNCTION: SUPERVISION
AMBULATION ASSISTANCE: 1
AMBULATION ASSISTANCE ON FLAT SURFACES: 1
PHYSICAL TRANSFERS ASSESSED: 1
AMBULATION ASSISTANCE: SUPERVISION

## 2024-10-10 ENCOUNTER — HOME CARE VISIT (OUTPATIENT)
Dept: HOME HEALTH SERVICES | Facility: HOME HEALTH | Age: 74
End: 2024-10-10
Payer: MEDICARE

## 2024-10-10 VITALS — HEART RATE: 95 BPM | OXYGEN SATURATION: 92 %

## 2024-10-10 VITALS
RESPIRATION RATE: 20 BRPM | HEART RATE: 102 BPM | SYSTOLIC BLOOD PRESSURE: 110 MMHG | TEMPERATURE: 98.4 F | OXYGEN SATURATION: 93 % | DIASTOLIC BLOOD PRESSURE: 62 MMHG

## 2024-10-10 PROCEDURE — G0152 HHCP-SERV OF OT,EA 15 MIN: HCPCS | Mod: HHH

## 2024-10-10 PROCEDURE — G0299 HHS/HOSPICE OF RN EA 15 MIN: HCPCS | Mod: HHH

## 2024-10-10 PROCEDURE — G0157 HHC PT ASSISTANT EA 15: HCPCS | Mod: CQ,HHH

## 2024-10-10 SDOH — ECONOMIC STABILITY: HOUSING INSECURITY: EVIDENCE OF SMOKING MATERIAL: 0

## 2024-10-10 SDOH — HEALTH STABILITY: MENTAL HEALTH: SMOKING IN HOME: 0

## 2024-10-10 ASSESSMENT — ACTIVITIES OF DAILY LIVING (ADL)
DRESSING_UB_CURRENT_FUNCTION: INDEPENDENT
WASHING_UPB_CURRENT_FUNCTION: INDEPENDENT
WASHING_LB_CURRENT_FUNCTION: INDEPENDENT
DRESSING_LB_CURRENT_FUNCTION: INDEPENDENT

## 2024-10-10 ASSESSMENT — ENCOUNTER SYMPTOMS
PERSON REPORTING PAIN: PATIENT
PAIN SEVERITY GOAL: 0/10
DENIES PAIN: 1
PAIN: 1
PERSON REPORTING PAIN: PATIENT
SHORTNESS OF BREATH: 1
LOWEST PAIN SEVERITY IN PAST 24 HOURS: 0/10
DYSPNEA ACTIVITY LEVEL: AFTER AMBULATING 10 - 20 FT
HIGHEST PAIN SEVERITY IN PAST 24 HOURS: 5/10
DENIES PAIN: 1

## 2024-10-11 ASSESSMENT — ENCOUNTER SYMPTOMS: APPETITE LEVEL: FAIR

## 2024-10-14 ENCOUNTER — APPOINTMENT (OUTPATIENT)
Dept: HOME HEALTH SERVICES | Facility: HOME HEALTH | Age: 74
End: 2024-10-14
Payer: MEDICARE

## 2024-10-17 ENCOUNTER — HOME CARE VISIT (OUTPATIENT)
Dept: HOME HEALTH SERVICES | Facility: HOME HEALTH | Age: 74
End: 2024-10-17
Payer: MEDICARE

## 2024-10-17 VITALS
HEART RATE: 92 BPM | OXYGEN SATURATION: 93 % | RESPIRATION RATE: 16 BRPM | SYSTOLIC BLOOD PRESSURE: 122 MMHG | DIASTOLIC BLOOD PRESSURE: 78 MMHG | TEMPERATURE: 98.2 F

## 2024-10-17 PROCEDURE — G0300 HHS/HOSPICE OF LPN EA 15 MIN: HCPCS | Mod: HHH

## 2024-10-17 SDOH — HEALTH STABILITY: MENTAL HEALTH: SMOKING IN HOME: 0

## 2024-10-17 ASSESSMENT — ENCOUNTER SYMPTOMS
DENIES PAIN: 1
OCCASIONAL FEELINGS OF UNSTEADINESS: 0
CHANGE IN APPETITE: UNCHANGED
LAST BOWEL MOVEMENT: 67130
APPETITE LEVEL: GOOD
DEPRESSION: 0
LOSS OF SENSATION IN FEET: 0

## 2024-10-17 ASSESSMENT — ACTIVITIES OF DAILY LIVING (ADL)
AMBULATION ASSISTANCE: 1
AMBULATION ASSISTANCE: INDEPENDENT

## 2024-10-23 ENCOUNTER — PATIENT OUTREACH (OUTPATIENT)
Dept: PRIMARY CARE | Facility: CLINIC | Age: 74
End: 2024-10-23
Payer: MEDICARE

## 2024-10-23 NOTE — PROGRESS NOTES
Call regarding appt. with PCP on 9/24/24 after hospitalization.  At time of outreach call the patient feels as if their condition has improved since last visit.  Reviewed the PCP appointment with the pt and addressed any questions or concerns.  She had some additional questions regarding oxygen use, encouraged her to call her oxygen company and speak with the respiratory therapist.   OT has ended. Still receiving physical therapy.

## 2024-10-25 ENCOUNTER — HOME CARE VISIT (OUTPATIENT)
Dept: HOME HEALTH SERVICES | Facility: HOME HEALTH | Age: 74
End: 2024-10-25
Payer: MEDICARE

## 2024-10-25 VITALS
HEART RATE: 88 BPM | OXYGEN SATURATION: 93 % | SYSTOLIC BLOOD PRESSURE: 118 MMHG | DIASTOLIC BLOOD PRESSURE: 78 MMHG | TEMPERATURE: 97.7 F

## 2024-10-25 VITALS
TEMPERATURE: 97.9 F | DIASTOLIC BLOOD PRESSURE: 76 MMHG | RESPIRATION RATE: 18 BRPM | OXYGEN SATURATION: 93 % | HEART RATE: 101 BPM | SYSTOLIC BLOOD PRESSURE: 126 MMHG

## 2024-10-25 PROCEDURE — G0151 HHCP-SERV OF PT,EA 15 MIN: HCPCS | Mod: HHH

## 2024-10-25 PROCEDURE — G0299 HHS/HOSPICE OF RN EA 15 MIN: HCPCS | Mod: HHH

## 2024-10-25 SDOH — ECONOMIC STABILITY: HOUSING INSECURITY: EVIDENCE OF SMOKING MATERIAL: 0

## 2024-10-25 SDOH — HEALTH STABILITY: MENTAL HEALTH: SMOKING IN HOME: 0

## 2024-10-25 ASSESSMENT — ENCOUNTER SYMPTOMS
APPETITE LEVEL: GOOD
CHANGE IN APPETITE: UNCHANGED
DYSPNEA ACTIVITY LEVEL: AFTER AMBULATING LESS THAN 10 FT
SHORTNESS OF BREATH: 1
DENIES PAIN: 1
PERSON REPORTING PAIN: PATIENT
PERSON REPORTING PAIN: PATIENT

## 2024-10-25 NOTE — CASE COMMUNICATION
DISCHARGE SUMMARY:    DISCIPLINE: PT  DATE OF DISCIPLINE DISCHARGE: [id number]  REASON FOR DISCHARGE: Goals met  EVALUATION OF GOALS: yes   SUMMARY OF CARE PROVIDED:   DISCHARGE INSTRUCIONS GIVEN: Cont hep  SERVICES REMAINING: Sn   NOMNC OBTAINED: NA

## 2024-10-26 ASSESSMENT — ENCOUNTER SYMPTOMS
HIGHEST PAIN SEVERITY IN PAST 24 HOURS: 0/10
LOWEST PAIN SEVERITY IN PAST 24 HOURS: 0/10
SUBJECTIVE PAIN PROGRESSION: WAXING AND WANING
PAIN: 1

## 2024-10-26 ASSESSMENT — PAIN SCALES - PAIN ASSESSMENT IN ADVANCED DEMENTIA (PAINAD)
FACIALEXPRESSION: 0
BODYLANGUAGE: 0
CONSOLABILITY: 0
BREATHING: 0
NEGVOCALIZATION: 0
CONSOLABILITY: 0 - NO NEED TO CONSOLE.
TOTALSCORE: 0
BODYLANGUAGE: 0 - RELAXED.
FACIALEXPRESSION: 0 - SMILING OR INEXPRESSIVE.
NEGVOCALIZATION: 0 - NONE.

## 2024-10-26 ASSESSMENT — ACTIVITIES OF DAILY LIVING (ADL): AMBULATION ASSISTANCE ON FLAT SURFACES: 1

## 2024-11-01 ENCOUNTER — APPOINTMENT (OUTPATIENT)
Dept: PRIMARY CARE | Facility: CLINIC | Age: 74
End: 2024-11-01
Payer: MEDICARE

## 2024-11-01 ENCOUNTER — HOME CARE VISIT (OUTPATIENT)
Dept: HOME HEALTH SERVICES | Facility: HOME HEALTH | Age: 74
End: 2024-11-01
Payer: MEDICARE

## 2024-11-01 VITALS
HEART RATE: 102 BPM | RESPIRATION RATE: 24 BRPM | SYSTOLIC BLOOD PRESSURE: 120 MMHG | TEMPERATURE: 98.3 F | DIASTOLIC BLOOD PRESSURE: 76 MMHG

## 2024-11-01 PROCEDURE — G0299 HHS/HOSPICE OF RN EA 15 MIN: HCPCS | Mod: HHH

## 2024-11-01 ASSESSMENT — ENCOUNTER SYMPTOMS
PERSON REPORTING PAIN: PATIENT
APPETITE LEVEL: GOOD
CHANGE IN APPETITE: UNCHANGED
DYSPNEA ACTIVITY LEVEL: WHILE SPEAKING
DENIES PAIN: 1
SHORTNESS OF BREATH: 1

## 2024-11-04 ENCOUNTER — APPOINTMENT (OUTPATIENT)
Dept: PRIMARY CARE | Facility: CLINIC | Age: 74
End: 2024-11-04
Payer: MEDICARE

## 2024-11-04 VITALS — WEIGHT: 108.2 LBS | HEIGHT: 62 IN | BODY MASS INDEX: 19.91 KG/M2

## 2024-11-04 DIAGNOSIS — I35.0 MODERATE AORTIC STENOSIS: ICD-10-CM

## 2024-11-04 DIAGNOSIS — I10 BENIGN ESSENTIAL HYPERTENSION: ICD-10-CM

## 2024-11-04 DIAGNOSIS — R53.81 PHYSICAL DECONDITIONING: ICD-10-CM

## 2024-11-04 DIAGNOSIS — J44.1 COPD EXACERBATION (MULTI): Primary | ICD-10-CM

## 2024-11-04 PROCEDURE — G2211 COMPLEX E/M VISIT ADD ON: HCPCS | Performed by: FAMILY MEDICINE

## 2024-11-04 PROCEDURE — 1159F MED LIST DOCD IN RCRD: CPT | Performed by: FAMILY MEDICINE

## 2024-11-04 PROCEDURE — 99215 OFFICE O/P EST HI 40 MIN: CPT | Performed by: FAMILY MEDICINE

## 2024-11-04 PROCEDURE — 1123F ACP DISCUSS/DSCN MKR DOCD: CPT | Performed by: FAMILY MEDICINE

## 2024-11-04 ASSESSMENT — ENCOUNTER SYMPTOMS
DEPRESSION: 0
LOSS OF SENSATION IN FEET: 0
OCCASIONAL FEELINGS OF UNSTEADINESS: 0

## 2024-11-04 ASSESSMENT — PATIENT HEALTH QUESTIONNAIRE - PHQ9
1. LITTLE INTEREST OR PLEASURE IN DOING THINGS: NOT AT ALL
SUM OF ALL RESPONSES TO PHQ9 QUESTIONS 1 AND 2: 0
2. FEELING DOWN, DEPRESSED OR HOPELESS: NOT AT ALL

## 2024-11-04 NOTE — PROGRESS NOTES
"Subjective   Patient ID: Humera Villegas is a 74 y.o. female who presents for Follow-up.      HPI  Here for f/up  COPD states she feels OK SOB imporving  HD taking sttain  Continues to be weak. Not doing much at home  Current Outpatient Medications on File Prior to Visit   Medication Sig Dispense Refill    albuterol (Ventolin HFA) 90 mcg/actuation inhaler Inhale 2 puffs every 4 hours if needed for wheezing or shortness of breath. 8 g 11    amLODIPine (Norvasc) 2.5 mg tablet TAKE 1 TABLET DAILY 90 tablet 3    atorvastatin (Lipitor) 20 mg tablet TAKE 1 TABLET DAILY 90 tablet 3    cholecalciferol (Vitamin D3) 25 MCG (1000 UT) tablet Take 1 tablet (25 mcg) by mouth once daily.      cyanocobalamin (Vitamin B-12) 1,000 mcg tablet Take 1 tablet (1,000 mcg) by mouth once daily. 90 tablet 3    denosumab (Prolia) 60 mg/mL syringe Inject 1 mL (60 mg) under the skin every 6 months.      fluticasone (Flonase) 50 mcg/actuation nasal spray Administer 2 sprays into each nostril once daily. Shake gently. Before first use, prime pump. After use, clean tip and replace cap. 16 g 2    fluticasone-umeclidin-vilanter (Trelegy Ellipta) 100-62.5-25 mcg blister with device Inhale 1 puff once daily. 90 each 1    omeprazole (PriLOSEC) 20 mg DR capsule TAKE 1 CAPSULE DAILY 90 capsule 3    oxygen (O2) gas therapy Inhale 3 L/min continuously. Indications: COPD      silicone,dressing-foam bandage 9 X 9 \" bandage Apply 1 patch topically once daily. 30 each 1    [DISCONTINUED] ALPRAZolam (Xanax) 0.5 mg tablet Take 1 tablet (0.5 mg) by mouth 3 times a day as needed for anxiety for up to 7 days. 21 tablet 0    [DISCONTINUED] meloxicam (Mobic) 15 mg tablet Take 1 tablet (15 mg) by mouth once daily. (Patient not taking: Reported on 9/12/2024) 30 tablet 3    [DISCONTINUED] methocarbamol (Robaxin) 500 mg tablet Take 1-2 tabs every 8 hours as needed for spasms (Patient not taking: Reported on 9/12/2024) 60 tablet 2     No current facility-administered " "medications on file prior to visit.        Review of Systems   Constitutional:  Negative for chills and fever.   HENT:  Positive for congestion.    Respiratory:  Positive for chest tightness, shortness of breath, wheezing and stridor. Negative for apnea and choking.    Cardiovascular: Negative.  Negative for chest pain, palpitations and leg swelling.   Gastrointestinal: Negative.  Negative for nausea and vomiting.   Endocrine: Negative.    Genitourinary: Negative.    Musculoskeletal:  Positive for gait problem, joint swelling and myalgias.   Skin: Negative.  Negative for rash.   Allergic/Immunologic: Negative.    Hematological: Negative.    Psychiatric/Behavioral:  The patient is nervous/anxious.    All other systems reviewed and are negative.      Objective   Ht 1.575 m (5' 2\")   Wt 49.1 kg (108 lb 3.2 oz)   BMI 19.79 kg/m²   BSA: 1.47 meters squared  Growth percentiles: Facility age limit for growth %melodie is 20 years. Facility age limit for growth %melodie is 20 years.   No visits with results within 1 Week(s) from this visit.   Latest known visit with results is:   No results displayed because visit has over 200 results.         Physical Exam  Constitutional:       General: She is not in acute distress.     Appearance: Normal appearance.   HENT:      Right Ear: Tympanic membrane normal.      Left Ear: Tympanic membrane normal.      Mouth/Throat:      Mouth: Mucous membranes are moist.   Cardiovascular:      Rate and Rhythm: Normal rate and regular rhythm.   Pulmonary:      Comments: Diminished breath sounds  Musculoskeletal:         General: Normal range of motion.   Neurological:      Mental Status: She is alert.         Assessment/Plan   Problem List Items Addressed This Visit             ICD-10-CM    Benign essential hypertension I10     Patient's blood pressure is at goal of 130/85 or less. Condition is stable. Continue current medications and treatment plan.  I recommend that you exercise for 30-45 minutes 5 " days a week.  I also recommend a balanced diet with fruits and vegetables every day, lean meats, and little fried foods. The DASH diet (you can find this online) is a good example of this.           COPD exacerbation (Multi) - Primary J44.1     Pulmnology follow up in AM  C/w Trelegy and O2   #HLD  -Continue home medications including amlodipine 2.5 mg PO daily and atorvastatin 20mg PO daily      #anxiety disorder  -Continue home medications including Xanax 0.5mg PO TID PRN      #DVT prophlaxis  -Lovenox           Moderate aortic stenosis I35.0     Possibly causing SOB         Physical deconditioning R53.81     WILL START hhc ot/pt

## 2024-11-05 ENCOUNTER — APPOINTMENT (OUTPATIENT)
Dept: CARDIOLOGY | Facility: HOSPITAL | Age: 74
End: 2024-11-05
Payer: MEDICARE

## 2024-11-05 ENCOUNTER — APPOINTMENT (OUTPATIENT)
Dept: RADIOLOGY | Facility: HOSPITAL | Age: 74
End: 2024-11-05
Payer: MEDICARE

## 2024-11-05 ENCOUNTER — HOSPITAL ENCOUNTER (INPATIENT)
Facility: HOSPITAL | Age: 74
End: 2024-11-05
Attending: INTERNAL MEDICINE | Admitting: INTERNAL MEDICINE
Payer: MEDICARE

## 2024-11-05 ENCOUNTER — HOSPITAL ENCOUNTER (INPATIENT)
Facility: HOSPITAL | Age: 74
LOS: 3 days | Discharge: HOME | End: 2024-11-08
Attending: EMERGENCY MEDICINE | Admitting: INTERNAL MEDICINE
Payer: MEDICARE

## 2024-11-05 ENCOUNTER — OFFICE VISIT (OUTPATIENT)
Dept: PULMONOLOGY | Facility: CLINIC | Age: 74
DRG: 189 | End: 2024-11-05
Payer: MEDICARE

## 2024-11-05 VITALS
SYSTOLIC BLOOD PRESSURE: 137 MMHG | WEIGHT: 107.4 LBS | HEART RATE: 121 BPM | BODY MASS INDEX: 19.77 KG/M2 | DIASTOLIC BLOOD PRESSURE: 78 MMHG | TEMPERATURE: 97.5 F | OXYGEN SATURATION: 65 % | HEIGHT: 62 IN

## 2024-11-05 DIAGNOSIS — R91.1 LUNG NODULE: ICD-10-CM

## 2024-11-05 DIAGNOSIS — K21.00 GASTROESOPHAGEAL REFLUX DISEASE WITH ESOPHAGITIS, UNSPECIFIED WHETHER HEMORRHAGE: ICD-10-CM

## 2024-11-05 DIAGNOSIS — R09.02 HYPOXIA: Primary | ICD-10-CM

## 2024-11-05 DIAGNOSIS — J43.9 PULMONARY EMPHYSEMA, UNSPECIFIED EMPHYSEMA TYPE (MULTI): Primary | ICD-10-CM

## 2024-11-05 DIAGNOSIS — J44.1 COPD EXACERBATION (MULTI): ICD-10-CM

## 2024-11-05 DIAGNOSIS — J43.9 PULMONARY EMPHYSEMA, UNSPECIFIED EMPHYSEMA TYPE (MULTI): ICD-10-CM

## 2024-11-05 DIAGNOSIS — J44.9 CHRONIC OBSTRUCTIVE PULMONARY DISEASE, UNSPECIFIED COPD TYPE (MULTI): ICD-10-CM

## 2024-11-05 LAB
ANION GAP BLDV CALCULATED.4IONS-SCNC: 10 MMOL/L (ref 10–25)
ANION GAP SERPL CALC-SCNC: 12 MMOL/L (ref 10–20)
ANION GAP SERPL CALC-SCNC: 13 MMOL/L (ref 10–20)
BASE EXCESS BLDV CALC-SCNC: 2.4 MMOL/L (ref -2–3)
BASOPHILS # BLD AUTO: 0.01 X10*3/UL (ref 0–0.1)
BASOPHILS NFR BLD AUTO: 0.2 %
BNP SERPL-MCNC: 140 PG/ML (ref 0–99)
BODY TEMPERATURE: 37 DEGREES CELSIUS
BUN SERPL-MCNC: 15 MG/DL (ref 6–23)
BUN SERPL-MCNC: 16 MG/DL (ref 6–23)
CA-I BLDV-SCNC: 1.21 MMOL/L (ref 1.1–1.33)
CALCIUM SERPL-MCNC: 9.1 MG/DL (ref 8.6–10.3)
CALCIUM SERPL-MCNC: 9.6 MG/DL (ref 8.6–10.3)
CARDIAC TROPONIN I PNL SERPL HS: 7 NG/L (ref 0–13)
CHLORIDE BLDV-SCNC: 99 MMOL/L (ref 98–107)
CHLORIDE SERPL-SCNC: 100 MMOL/L (ref 98–107)
CHLORIDE SERPL-SCNC: 103 MMOL/L (ref 98–107)
CO2 SERPL-SCNC: 25 MMOL/L (ref 21–32)
CO2 SERPL-SCNC: 29 MMOL/L (ref 21–32)
CREAT SERPL-MCNC: 0.67 MG/DL (ref 0.5–1.05)
CREAT SERPL-MCNC: 0.71 MG/DL (ref 0.5–1.05)
EGFRCR SERPLBLD CKD-EPI 2021: 88 ML/MIN/1.73M*2
EGFRCR SERPLBLD CKD-EPI 2021: >90 ML/MIN/1.73M*2
EOSINOPHIL # BLD AUTO: 0.03 X10*3/UL (ref 0–0.4)
EOSINOPHIL NFR BLD AUTO: 0.5 %
ERYTHROCYTE [DISTWIDTH] IN BLOOD BY AUTOMATED COUNT: 17.2 % (ref 11.5–14.5)
ERYTHROCYTE [DISTWIDTH] IN BLOOD BY AUTOMATED COUNT: 17.3 % (ref 11.5–14.5)
GLUCOSE BLDV-MCNC: 146 MG/DL (ref 74–99)
GLUCOSE SERPL-MCNC: 136 MG/DL (ref 74–99)
GLUCOSE SERPL-MCNC: 179 MG/DL (ref 74–99)
HCO3 BLDV-SCNC: 27.9 MMOL/L (ref 22–26)
HCT VFR BLD AUTO: 43.6 % (ref 36–46)
HCT VFR BLD AUTO: 44.6 % (ref 36–46)
HCT VFR BLD EST: 47 % (ref 36–46)
HGB BLD-MCNC: 14.3 G/DL (ref 12–16)
HGB BLD-MCNC: 14.6 G/DL (ref 12–16)
HGB BLDV-MCNC: 15.8 G/DL (ref 12–16)
IMM GRANULOCYTES # BLD AUTO: 0.02 X10*3/UL (ref 0–0.5)
IMM GRANULOCYTES NFR BLD AUTO: 0.4 % (ref 0–0.9)
INHALED O2 CONCENTRATION: 100 %
LACTATE BLDV-SCNC: 1.7 MMOL/L (ref 0.4–2)
LYMPHOCYTES # BLD AUTO: 1.04 X10*3/UL (ref 0.8–3)
LYMPHOCYTES NFR BLD AUTO: 18.9 %
MCH RBC QN AUTO: 30.6 PG (ref 26–34)
MCH RBC QN AUTO: 30.8 PG (ref 26–34)
MCHC RBC AUTO-ENTMCNC: 32.7 G/DL (ref 32–36)
MCHC RBC AUTO-ENTMCNC: 32.8 G/DL (ref 32–36)
MCV RBC AUTO: 93 FL (ref 80–100)
MCV RBC AUTO: 94 FL (ref 80–100)
MONOCYTES # BLD AUTO: 0.47 X10*3/UL (ref 0.05–0.8)
MONOCYTES NFR BLD AUTO: 8.5 %
NEUTROPHILS # BLD AUTO: 3.93 X10*3/UL (ref 1.6–5.5)
NEUTROPHILS NFR BLD AUTO: 71.5 %
NRBC BLD-RTO: 0 /100 WBCS (ref 0–0)
NRBC BLD-RTO: 0 /100 WBCS (ref 0–0)
OXYHGB MFR BLDV: 55.2 % (ref 45–75)
PCO2 BLDV: 45 MM HG (ref 41–51)
PH BLDV: 7.4 PH (ref 7.33–7.43)
PLATELET # BLD AUTO: 167 X10*3/UL (ref 150–450)
PLATELET # BLD AUTO: 179 X10*3/UL (ref 150–450)
PO2 BLDV: 38 MM HG (ref 35–45)
POTASSIUM BLDV-SCNC: 4.4 MMOL/L (ref 3.5–5.3)
POTASSIUM SERPL-SCNC: 3.7 MMOL/L (ref 3.5–5.3)
POTASSIUM SERPL-SCNC: 4.5 MMOL/L (ref 3.5–5.3)
RBC # BLD AUTO: 4.67 X10*6/UL (ref 4–5.2)
RBC # BLD AUTO: 4.74 X10*6/UL (ref 4–5.2)
SAO2 % BLDV: 56 % (ref 45–75)
SODIUM BLDV-SCNC: 132 MMOL/L (ref 136–145)
SODIUM SERPL-SCNC: 136 MMOL/L (ref 136–145)
SODIUM SERPL-SCNC: 137 MMOL/L (ref 136–145)
WBC # BLD AUTO: 5.2 X10*3/UL (ref 4.4–11.3)
WBC # BLD AUTO: 5.5 X10*3/UL (ref 4.4–11.3)

## 2024-11-05 PROCEDURE — 1125F AMNT PAIN NOTED PAIN PRSNT: CPT | Performed by: INTERNAL MEDICINE

## 2024-11-05 PROCEDURE — 71045 X-RAY EXAM CHEST 1 VIEW: CPT

## 2024-11-05 PROCEDURE — 94640 AIRWAY INHALATION TREATMENT: CPT

## 2024-11-05 PROCEDURE — 84132 ASSAY OF SERUM POTASSIUM: CPT | Performed by: INTERNAL MEDICINE

## 2024-11-05 PROCEDURE — 93005 ELECTROCARDIOGRAM TRACING: CPT

## 2024-11-05 PROCEDURE — 85027 COMPLETE CBC AUTOMATED: CPT | Performed by: INTERNAL MEDICINE

## 2024-11-05 PROCEDURE — 99214 OFFICE O/P EST MOD 30 MIN: CPT | Performed by: INTERNAL MEDICINE

## 2024-11-05 PROCEDURE — 71275 CT ANGIOGRAPHY CHEST: CPT

## 2024-11-05 PROCEDURE — 3078F DIAST BP <80 MM HG: CPT | Performed by: INTERNAL MEDICINE

## 2024-11-05 PROCEDURE — 71275 CT ANGIOGRAPHY CHEST: CPT | Performed by: RADIOLOGY

## 2024-11-05 PROCEDURE — 99285 EMERGENCY DEPT VISIT HI MDM: CPT | Mod: 25

## 2024-11-05 PROCEDURE — 85025 COMPLETE CBC W/AUTO DIFF WBC: CPT | Performed by: EMERGENCY MEDICINE

## 2024-11-05 PROCEDURE — 2500000005 HC RX 250 GENERAL PHARMACY W/O HCPCS: Performed by: EMERGENCY MEDICINE

## 2024-11-05 PROCEDURE — 2500000004 HC RX 250 GENERAL PHARMACY W/ HCPCS (ALT 636 FOR OP/ED): Performed by: INTERNAL MEDICINE

## 2024-11-05 PROCEDURE — 2550000001 HC RX 255 CONTRASTS: Performed by: INTERNAL MEDICINE

## 2024-11-05 PROCEDURE — 96374 THER/PROPH/DIAG INJ IV PUSH: CPT

## 2024-11-05 PROCEDURE — 36415 COLL VENOUS BLD VENIPUNCTURE: CPT | Performed by: EMERGENCY MEDICINE

## 2024-11-05 PROCEDURE — 84132 ASSAY OF SERUM POTASSIUM: CPT | Performed by: EMERGENCY MEDICINE

## 2024-11-05 PROCEDURE — 71045 X-RAY EXAM CHEST 1 VIEW: CPT | Performed by: RADIOLOGY

## 2024-11-05 PROCEDURE — 3075F SYST BP GE 130 - 139MM HG: CPT | Performed by: INTERNAL MEDICINE

## 2024-11-05 PROCEDURE — 1160F RVW MEDS BY RX/DR IN RCRD: CPT | Performed by: INTERNAL MEDICINE

## 2024-11-05 PROCEDURE — 2500000002 HC RX 250 W HCPCS SELF ADMINISTERED DRUGS (ALT 637 FOR MEDICARE OP, ALT 636 FOR OP/ED): Performed by: EMERGENCY MEDICINE

## 2024-11-05 PROCEDURE — 2500000004 HC RX 250 GENERAL PHARMACY W/ HCPCS (ALT 636 FOR OP/ED): Performed by: EMERGENCY MEDICINE

## 2024-11-05 PROCEDURE — 1123F ACP DISCUSS/DSCN MKR DOCD: CPT | Performed by: INTERNAL MEDICINE

## 2024-11-05 PROCEDURE — 84484 ASSAY OF TROPONIN QUANT: CPT | Performed by: EMERGENCY MEDICINE

## 2024-11-05 PROCEDURE — 1200000002 HC GENERAL ROOM WITH TELEMETRY DAILY

## 2024-11-05 PROCEDURE — 1159F MED LIST DOCD IN RCRD: CPT | Performed by: INTERNAL MEDICINE

## 2024-11-05 PROCEDURE — 83880 ASSAY OF NATRIURETIC PEPTIDE: CPT | Performed by: EMERGENCY MEDICINE

## 2024-11-05 PROCEDURE — 99222 1ST HOSP IP/OBS MODERATE 55: CPT | Performed by: INTERNAL MEDICINE

## 2024-11-05 PROCEDURE — 5A0935A ASSISTANCE WITH RESPIRATORY VENTILATION, LESS THAN 24 CONSECUTIVE HOURS, HIGH NASAL FLOW/VELOCITY: ICD-10-PCS | Performed by: INTERNAL MEDICINE

## 2024-11-05 PROCEDURE — 2500000002 HC RX 250 W HCPCS SELF ADMINISTERED DRUGS (ALT 637 FOR MEDICARE OP, ALT 636 FOR OP/ED): Performed by: INTERNAL MEDICINE

## 2024-11-05 RX ORDER — ALBUTEROL SULFATE 0.83 MG/ML
2.5 SOLUTION RESPIRATORY (INHALATION) EVERY 6 HOURS PRN
Status: DISCONTINUED | OUTPATIENT
Start: 2024-11-05 | End: 2024-11-05

## 2024-11-05 RX ORDER — BUDESONIDE 0.25 MG/2ML
0.25 INHALANT ORAL
Status: DISCONTINUED | OUTPATIENT
Start: 2024-11-05 | End: 2024-11-08 | Stop reason: HOSPADM

## 2024-11-05 RX ORDER — GUAIFENESIN 600 MG/1
600 TABLET, EXTENDED RELEASE ORAL EVERY 12 HOURS PRN
Status: DISCONTINUED | OUTPATIENT
Start: 2024-11-05 | End: 2024-11-08 | Stop reason: HOSPADM

## 2024-11-05 RX ORDER — ONDANSETRON 4 MG/1
4 TABLET, FILM COATED ORAL EVERY 8 HOURS PRN
Status: DISCONTINUED | OUTPATIENT
Start: 2024-11-05 | End: 2024-11-08 | Stop reason: HOSPADM

## 2024-11-05 RX ORDER — IPRATROPIUM BROMIDE AND ALBUTEROL SULFATE 2.5; .5 MG/3ML; MG/3ML
3 SOLUTION RESPIRATORY (INHALATION)
Status: DISCONTINUED | OUTPATIENT
Start: 2024-11-05 | End: 2024-11-08 | Stop reason: HOSPADM

## 2024-11-05 RX ORDER — ALBUTEROL SULFATE 90 UG/1
2 INHALANT RESPIRATORY (INHALATION) EVERY 4 HOURS PRN
Status: DISCONTINUED | OUTPATIENT
Start: 2024-11-05 | End: 2024-11-05

## 2024-11-05 RX ORDER — ACETAMINOPHEN 160 MG/5ML
650 SOLUTION ORAL EVERY 4 HOURS PRN
Status: DISCONTINUED | OUTPATIENT
Start: 2024-11-05 | End: 2024-11-08 | Stop reason: HOSPADM

## 2024-11-05 RX ORDER — IPRATROPIUM BROMIDE AND ALBUTEROL SULFATE 2.5; .5 MG/3ML; MG/3ML
3 SOLUTION RESPIRATORY (INHALATION) EVERY 20 MIN
Status: COMPLETED | OUTPATIENT
Start: 2024-11-05 | End: 2024-11-05

## 2024-11-05 RX ORDER — ONDANSETRON HYDROCHLORIDE 2 MG/ML
4 INJECTION, SOLUTION INTRAVENOUS EVERY 8 HOURS PRN
Status: DISCONTINUED | OUTPATIENT
Start: 2024-11-05 | End: 2024-11-08 | Stop reason: HOSPADM

## 2024-11-05 RX ORDER — ALPRAZOLAM 0.5 MG/1
0.5 TABLET ORAL 3 TIMES DAILY PRN
Status: DISCONTINUED | OUTPATIENT
Start: 2024-11-05 | End: 2024-11-08 | Stop reason: HOSPADM

## 2024-11-05 RX ORDER — AMLODIPINE BESYLATE 5 MG/1
2.5 TABLET ORAL DAILY
Status: DISCONTINUED | OUTPATIENT
Start: 2024-11-05 | End: 2024-11-08 | Stop reason: HOSPADM

## 2024-11-05 RX ORDER — ACETAMINOPHEN 325 MG/1
650 TABLET ORAL EVERY 4 HOURS PRN
Status: DISCONTINUED | OUTPATIENT
Start: 2024-11-05 | End: 2024-11-08 | Stop reason: HOSPADM

## 2024-11-05 RX ORDER — ENOXAPARIN SODIUM 100 MG/ML
40 INJECTION SUBCUTANEOUS EVERY 24 HOURS
Status: DISCONTINUED | OUTPATIENT
Start: 2024-11-05 | End: 2024-11-08 | Stop reason: HOSPADM

## 2024-11-05 RX ORDER — FLUTICASONE FUROATE AND VILANTEROL 200; 25 UG/1; UG/1
1 POWDER RESPIRATORY (INHALATION)
Status: DISCONTINUED | OUTPATIENT
Start: 2024-11-06 | End: 2024-11-05

## 2024-11-05 RX ORDER — POLYETHYLENE GLYCOL 3350 17 G/17G
17 POWDER, FOR SOLUTION ORAL DAILY PRN
Status: DISCONTINUED | OUTPATIENT
Start: 2024-11-05 | End: 2024-11-08 | Stop reason: HOSPADM

## 2024-11-05 RX ORDER — ATORVASTATIN CALCIUM 20 MG/1
20 TABLET, FILM COATED ORAL DAILY
Status: DISCONTINUED | OUTPATIENT
Start: 2024-11-05 | End: 2024-11-08 | Stop reason: HOSPADM

## 2024-11-05 RX ORDER — TALC
3 POWDER (GRAM) TOPICAL NIGHTLY PRN
Status: DISCONTINUED | OUTPATIENT
Start: 2024-11-05 | End: 2024-11-08 | Stop reason: HOSPADM

## 2024-11-05 RX ORDER — PANTOPRAZOLE SODIUM 40 MG/1
40 TABLET, DELAYED RELEASE ORAL
Status: DISCONTINUED | OUTPATIENT
Start: 2024-11-06 | End: 2024-11-08 | Stop reason: HOSPADM

## 2024-11-05 RX ORDER — PANTOPRAZOLE SODIUM 40 MG/10ML
40 INJECTION, POWDER, LYOPHILIZED, FOR SOLUTION INTRAVENOUS
Status: DISCONTINUED | OUTPATIENT
Start: 2024-11-06 | End: 2024-11-08 | Stop reason: HOSPADM

## 2024-11-05 RX ORDER — ACETAMINOPHEN 650 MG/1
650 SUPPOSITORY RECTAL EVERY 4 HOURS PRN
Status: DISCONTINUED | OUTPATIENT
Start: 2024-11-05 | End: 2024-11-08 | Stop reason: HOSPADM

## 2024-11-05 RX ORDER — ALBUTEROL SULFATE 0.83 MG/ML
2.5 SOLUTION RESPIRATORY (INHALATION) EVERY 2 HOUR PRN
Status: DISCONTINUED | OUTPATIENT
Start: 2024-11-05 | End: 2024-11-08 | Stop reason: HOSPADM

## 2024-11-05 SDOH — SOCIAL STABILITY: SOCIAL INSECURITY: DO YOU FEEL UNSAFE GOING BACK TO THE PLACE WHERE YOU ARE LIVING?: NO

## 2024-11-05 SDOH — ECONOMIC STABILITY: INCOME INSECURITY: IN THE PAST 12 MONTHS HAS THE ELECTRIC, GAS, OIL, OR WATER COMPANY THREATENED TO SHUT OFF SERVICES IN YOUR HOME?: NO

## 2024-11-05 SDOH — SOCIAL STABILITY: SOCIAL INSECURITY: WITHIN THE LAST YEAR, HAVE YOU BEEN HUMILIATED OR EMOTIONALLY ABUSED IN OTHER WAYS BY YOUR PARTNER OR EX-PARTNER?: NO

## 2024-11-05 SDOH — SOCIAL STABILITY: SOCIAL INSECURITY: HAVE YOU HAD THOUGHTS OF HARMING ANYONE ELSE?: NO

## 2024-11-05 SDOH — SOCIAL STABILITY: SOCIAL INSECURITY
WITHIN THE LAST YEAR, HAVE YOU BEEN RAPED OR FORCED TO HAVE ANY KIND OF SEXUAL ACTIVITY BY YOUR PARTNER OR EX-PARTNER?: NO

## 2024-11-05 SDOH — ECONOMIC STABILITY: HOUSING INSECURITY: IN THE LAST 12 MONTHS, WAS THERE A TIME WHEN YOU WERE NOT ABLE TO PAY THE MORTGAGE OR RENT ON TIME?: NO

## 2024-11-05 SDOH — SOCIAL STABILITY: SOCIAL INSECURITY
WITHIN THE LAST YEAR, HAVE YOU BEEN KICKED, HIT, SLAPPED, OR OTHERWISE PHYSICALLY HURT BY YOUR PARTNER OR EX-PARTNER?: NO

## 2024-11-05 SDOH — SOCIAL STABILITY: SOCIAL INSECURITY: ARE THERE ANY APPARENT SIGNS OF INJURIES/BEHAVIORS THAT COULD BE RELATED TO ABUSE/NEGLECT?: NO

## 2024-11-05 SDOH — ECONOMIC STABILITY: HOUSING INSECURITY: AT ANY TIME IN THE PAST 12 MONTHS, WERE YOU HOMELESS OR LIVING IN A SHELTER (INCLUDING NOW)?: NO

## 2024-11-05 SDOH — ECONOMIC STABILITY: FOOD INSECURITY: WITHIN THE PAST 12 MONTHS, THE FOOD YOU BOUGHT JUST DIDN'T LAST AND YOU DIDN'T HAVE MONEY TO GET MORE.: NEVER TRUE

## 2024-11-05 SDOH — SOCIAL STABILITY: SOCIAL INSECURITY: WITHIN THE LAST YEAR, HAVE YOU BEEN AFRAID OF YOUR PARTNER OR EX-PARTNER?: NO

## 2024-11-05 SDOH — ECONOMIC STABILITY: TRANSPORTATION INSECURITY: IN THE PAST 12 MONTHS, HAS LACK OF TRANSPORTATION KEPT YOU FROM MEDICAL APPOINTMENTS OR FROM GETTING MEDICATIONS?: NO

## 2024-11-05 SDOH — ECONOMIC STABILITY: HOUSING INSECURITY: IN THE PAST 12 MONTHS, HOW MANY TIMES HAVE YOU MOVED WHERE YOU WERE LIVING?: 0

## 2024-11-05 SDOH — SOCIAL STABILITY: SOCIAL INSECURITY: DO YOU FEEL ANYONE HAS EXPLOITED OR TAKEN ADVANTAGE OF YOU FINANCIALLY OR OF YOUR PERSONAL PROPERTY?: NO

## 2024-11-05 SDOH — ECONOMIC STABILITY: FOOD INSECURITY: WITHIN THE PAST 12 MONTHS, YOU WORRIED THAT YOUR FOOD WOULD RUN OUT BEFORE YOU GOT THE MONEY TO BUY MORE.: NEVER TRUE

## 2024-11-05 SDOH — SOCIAL STABILITY: SOCIAL INSECURITY: WERE YOU ABLE TO COMPLETE ALL THE BEHAVIORAL HEALTH SCREENINGS?: YES

## 2024-11-05 SDOH — SOCIAL STABILITY: SOCIAL INSECURITY: HAS ANYONE EVER THREATENED TO HURT YOUR FAMILY OR YOUR PETS?: NO

## 2024-11-05 SDOH — SOCIAL STABILITY: SOCIAL INSECURITY: ABUSE: ADULT

## 2024-11-05 SDOH — SOCIAL STABILITY: SOCIAL INSECURITY: DOES ANYONE TRY TO KEEP YOU FROM HAVING/CONTACTING OTHER FRIENDS OR DOING THINGS OUTSIDE YOUR HOME?: NO

## 2024-11-05 SDOH — ECONOMIC STABILITY: FOOD INSECURITY: HOW HARD IS IT FOR YOU TO PAY FOR THE VERY BASICS LIKE FOOD, HOUSING, MEDICAL CARE, AND HEATING?: NOT HARD AT ALL

## 2024-11-05 SDOH — SOCIAL STABILITY: SOCIAL INSECURITY: ARE YOU OR HAVE YOU BEEN THREATENED OR ABUSED PHYSICALLY, EMOTIONALLY, OR SEXUALLY BY ANYONE?: NO

## 2024-11-05 ASSESSMENT — LIFESTYLE VARIABLES
HOW OFTEN DO YOU HAVE 6 OR MORE DRINKS ON ONE OCCASION: NEVER
HOW OFTEN DO YOU HAVE A DRINK CONTAINING ALCOHOL: NEVER
AUDIT-C TOTAL SCORE: 0
HOW MANY STANDARD DRINKS CONTAINING ALCOHOL DO YOU HAVE ON A TYPICAL DAY: PATIENT DOES NOT DRINK
SKIP TO QUESTIONS 9-10: 1
AUDIT-C TOTAL SCORE: 0

## 2024-11-05 ASSESSMENT — PAIN - FUNCTIONAL ASSESSMENT: PAIN_FUNCTIONAL_ASSESSMENT: 0-10

## 2024-11-05 ASSESSMENT — ACTIVITIES OF DAILY LIVING (ADL)
ASSISTIVE_DEVICE: EYEGLASSES
LACK_OF_TRANSPORTATION: NO
ADEQUATE_TO_COMPLETE_ADL: YES
TOILETING: INDEPENDENT
PATIENT'S MEMORY ADEQUATE TO SAFELY COMPLETE DAILY ACTIVITIES?: YES
HEARING - RIGHT EAR: FUNCTIONAL
BATHING: INDEPENDENT
JUDGMENT_ADEQUATE_SAFELY_COMPLETE_DAILY_ACTIVITIES: YES
DRESSING YOURSELF: INDEPENDENT
FEEDING YOURSELF: INDEPENDENT
WALKS IN HOME: INDEPENDENT
LACK_OF_TRANSPORTATION: NO
HEARING - LEFT EAR: FUNCTIONAL
GROOMING: INDEPENDENT

## 2024-11-05 ASSESSMENT — COLUMBIA-SUICIDE SEVERITY RATING SCALE - C-SSRS
6. HAVE YOU EVER DONE ANYTHING, STARTED TO DO ANYTHING, OR PREPARED TO DO ANYTHING TO END YOUR LIFE?: NO
1. IN THE PAST MONTH, HAVE YOU WISHED YOU WERE DEAD OR WISHED YOU COULD GO TO SLEEP AND NOT WAKE UP?: NO
2. HAVE YOU ACTUALLY HAD ANY THOUGHTS OF KILLING YOURSELF?: NO

## 2024-11-05 ASSESSMENT — COGNITIVE AND FUNCTIONAL STATUS - GENERAL
DAILY ACTIVITIY SCORE: 24
PATIENT BASELINE BEDBOUND: NO
MOBILITY SCORE: 24

## 2024-11-05 ASSESSMENT — ENCOUNTER SYMPTOMS: SHORTNESS OF BREATH: 1

## 2024-11-05 ASSESSMENT — PATIENT HEALTH QUESTIONNAIRE - PHQ9
1. LITTLE INTEREST OR PLEASURE IN DOING THINGS: NOT AT ALL
SUM OF ALL RESPONSES TO PHQ9 QUESTIONS 1 & 2: 0
2. FEELING DOWN, DEPRESSED OR HOPELESS: NOT AT ALL

## 2024-11-05 ASSESSMENT — PAIN SCALES - GENERAL
PAINLEVEL_OUTOF10: 2
PAINLEVEL_OUTOF10: 0 - NO PAIN
PAINLEVEL_OUTOF10: 0 - NO PAIN

## 2024-11-05 NOTE — PATIENT INSTRUCTIONS
Assessment and Plan / Recommendations:  Problem List Items Addressed This Visit       COPD (chronic obstructive pulmonary disease) (Multi) - Primary    Current Assessment & Plan     Continue inhalers- trelegy daily and rinse after, use albuterol as needed every       Goal sat ~ 92%    Will do PFTs and Desaturation testing    You qualify for reliance study- will have my research team reach out to you regarding azitromycin vs. Daliresp    Your saturation level today was 65-79% on you level 5 O2 machine.  With it at 5 it went up to 82%.  On our tank at 10L anamika tup to 92-94%.      Will send you to the hospital for further workup of acute on chronic hypoxemic respiratory---> you may need re-evaluation of the O2 levels - pulmonary and cardiac issues           COPD exacerbation (Multi)    Relevant Orders    Referral to Pulmonary Rehabilitation    Complete Pulmonary Function Test (Spirometry/DLCO/Lung Volumes)    Oximetry Desat/O2 Titration (Exercise Desat)     Office #[phone], can use  option or nurses line    Page Pacheco # [phone]     Radiology Scheduling #[phone]       Will need outpatient PFT and Desaturation testing

## 2024-11-05 NOTE — ED TRIAGE NOTES
Pt presents to the ED from doctor's appt with c/o SOB. Pt was at doctor's appointment and was told to come here due to low oxygen. Pt is short of breath and states that she was just in the hospital for the same complaint of hypoxia. Denies feeling lightheaded or dizzy. Denies CP.

## 2024-11-05 NOTE — PROGRESS NOTES
Department of Medicine  Division of Pulmonary, Critical Care, and Sleep Medicine  Consultation  [address] [facility]    I was asked by Jacquie More to evaluate Humera Villegas for dyspnea. I have independently interviewed and examined the patient in the office and reviewed available records.    Physician HPI (11/5/2024):      PCP- Fabio Chou     CC: dyspnea     73 yo F past >40 pack year smoking history.  Here today accompanied by her friend.    States she was referred by PCP for dyspnea, recently hospitalized in Sept for 5 days with COPD exacerbation and discharged on 3L O2- was also treated with antibiotics and steroids.      Diagnosed with COPD ~ 4 years ago.    No prior pulmonary evaluation prior to hospitalization, no prior O2 needs.    Due to see cardiology 11/18/24    PFTs 2020- severe COPD, positive air trapping, reduced DLCO  CAT today 21    Taking Trelegy daily and rinsing after  Albuterol barely using     ROS: no fevesr, chills, chest pain, palpitations, nausea, emesis, diarrhea, weight changes, does note occasional muscle spasms from coughing, occasional abdominal ches and ddry heaves, cough with clesar to yellow sputum, other ROS reviewed and negative for complaints.     Was utd with mammo and colonoscopy until aged out.      mMRC 3 today    Pmhx as below    Social Hx: smoked for > 40 years 1 - 1 1/2 ppd, quit 15 years ago.  No etoh or drugs.        Immunization History:  Immunization History   Administered Date(s) Administered    Flu vaccine, quadrivalent, high-dose, preservative free, age 65y+ (FLUZONE) 09/11/2020, 10/04/2022    Flu vaccine, trivalent, preservative free, HIGH-DOSE, age 65y+ (Fluzone) 09/29/2016, 09/26/2017, 09/17/2018    Influenza, Seasonal, Quadrivalent, Adjuvanted 10/05/2021, 09/08/2023    Influenza, trivalent, adjuvanted 09/16/2019    Moderna SARS-CoV-2 Vaccination 02/26/2021, 03/26/2021, 10/26/2021    Pneumococcal conjugate vaccine, 13-valent (PREVNAR  13) 09/09/2018    Pneumococcal conjugate vaccine, 20-valent (PREVNAR 20) 09/08/2023    Pneumococcal polysaccharide vaccine, 23-valent, age 2 years and older (PNEUMOVAX 23) 11/03/2016    Zoster vaccine, recombinant, adult (SHINGRIX) 11/05/2018, 03/12/2019       Problem List:  Patient Active Problem List   Diagnosis    Abnormal mammogram    Anxiety    Back pain    Benign essential hypertension    Bursitis of left shoulder    COPD (chronic obstructive pulmonary disease) (Multi)    Emphysematous COPD (Multi)    Abnormal blood sugar    AR (allergic rhinitis)    Gastroesophageal reflux disease    Heartburn    Elevated LDL cholesterol level    Hyperglycemia    Osteoporosis    Chronic vulvitis    Menopausal osteoporosis    Bilateral carotid bruits    Systolic ejection murmur    Visit for screening mammogram    Strain of lumbar region    Therapeutic    MOMO (generalized anxiety disorder)    Encounter for therapeutic drug level monitoring    Routine general medical examination at health care facility    Protein-calorie malnutrition, unspecified severity (Multi)    COPD exacerbation (Multi)    Moderate aortic stenosis    Acute respiratory failure    Sacral wound    Physical deconditioning       Family History:  Family History   Problem Relation Name Age of Onset    Other (cardiac disorder) Mother      Diabetes Mother      Other (cardiac disorder) Father         Social History:  Social History     Socioeconomic History    Marital status:    Tobacco Use    Smoking status: Never     Passive exposure: Never    Smokeless tobacco: Never   Substance and Sexual Activity    Alcohol use: Never    Drug use: Never     Social Drivers of Health     Financial Resource Strain: Low Risk  (9/13/2024)    Overall Financial Resource Strain (CARDIA)     Difficulty of Paying Living Expenses: Not hard at all   Food Insecurity: No Food Insecurity (9/13/2024)    Hunger Vital Sign     Worried About Running Out of Food in the Last Year: Never true  "    Ran Out of Food in the Last Year: Never true   Transportation Needs: No Transportation Needs (10/3/2024)    OASIS : Transportation     Lack of Transportation (Medical): No     Lack of Transportation (Non-Medical): No     Patient Unable or Declines to Respond: No   Physical Activity: Insufficiently Active (9/13/2024)    Exercise Vital Sign     Days of Exercise per Week: 2 days     Minutes of Exercise per Session: 10 min   Stress: No Stress Concern Present (9/13/2024)    [employer] - Occupational Stress Questionnaire     Feeling of Stress : Not at all   Social Connections: Feeling Socially Integrated (10/3/2024)    OASIS : Social Isolation     Frequency of experiencing loneliness or isolation: Never   Housing Stability: Low Risk  (9/13/2024)    Housing Stability Vital Sign     Unable to Pay for Housing in the Last Year: No     Number of Times Moved in the Last Year: 0     Homeless in the Last Year: No       Current Medications:  Current Outpatient Medications   Medication Instructions    albuterol (Ventolin HFA) 90 mcg/actuation inhaler 2 puffs, inhalation, Every 4 hours PRN    ALPRAZolam (XANAX) 0.5 mg, oral, 3 times daily PRN    amLODIPine (NORVASC) 2.5 mg, oral, Daily    atorvastatin (LIPITOR) 20 mg, oral, Daily    cholecalciferol (Vitamin D3) 25 MCG (1000 UT) tablet 1 tablet, Daily    cyanocobalamin (VITAMIN B-12) 1,000 mcg, oral, Daily    denosumab (Prolia) 60 mg/mL syringe 1 mL, Every 6 months    fluticasone (Flonase) 50 mcg/actuation nasal spray 2 sprays, Each Nostril, Daily, Shake gently. Before first use, prime pump. After use, clean tip and replace cap.    fluticasone-umeclidin-vilanter (Trelegy Ellipta) 100-62.5-25 mcg blister with device 1 puff, inhalation, Daily    omeprazole (PRILOSEC) 20 mg, oral, Daily    oxygen (O2) 3 L/min, Continuous    silicone,dressing-foam bandage 9 X 9 \" bandage 1 patch, topical (top), Daily        Drug " "Allergies/Intolerances:  Allergies   Allergen Reactions    Amoxicillin Unknown    Penicillin Other        Review of Systems:    All other review of systems are negative and/or non-contributory.    Physical Examination:  /78   Pulse (!) 121   Temp 36.4 °C (97.5 °F)   Ht 1.575 m (5' 2\")   Wt 48.7 kg (107 lb 6.4 oz)   SpO2 (!) 65%   BMI 19.64 kg/m²      General: ambulated independently; no acute distress; well-nourished; work of breathing was not increased; normal vocal character  HEENT: normocephalic; anicteric sclerae; conjunctivae not injected; nasal mucosa was unremarkable; oropharynx was clear without evidence of thrush; dentition was good.  Neck: supple; no lymphadenopathy or thyromegaly.  Chest: clear to auscultation bilaterally; no chest wall deformity.  Cardiac: regular rhythm; no gallop or murmur.  Extremities: no leg edema; no digital clubbing;   Psychiatric: did not appear depressed or anxious.    Pulmonary Function Test Results     As above    Chest Radiograph     XR chest 2 views 09/12/2024    Narrative  STUDY:  Chest Radiographs;  9/12/2024 14:55  INDICATION:  Shortness of breath.  COMPARISON:  9/9/2024 XR Chest  ACCESSION NUMBER(S):  [id number]  ORDERING CLINICIAN:  ELICIA VEGA  TECHNIQUE:  Frontal and lateral chest.  FINDINGS:  The lungs are hyperinflated.  There are bibasilar opacities,  indicative of atelectasis or pneumonia.  Heart size is within normal  limits.  There is no evidence of a pneumothorax.    Impression  1.  Bibasilar opacities, indicative of atelectasis or pneumonia.  2.  COPD.  Signed by Tomas Ugarte MD      Echocardiogram     ECHO 9/14/2024     [facility], [address] [place] [address]               Tel [phone] and Fax [phone]     TRANSTHORACIC ECHOCARDIOGRAM REPORT        Patient Name:      SUSANNA LOWE         Reading Physician:    [id number] Micheal Arroyo MD  Study Date:     "    9/14/2024            Ordering Provider:    [id number] NAFISA                                                                JOSEPH  MRN/PID:           [id number]             Fellow:  Accession#:        [id number]         Nurse:  Date of Birth/Age: 1950 / 74 years Sonographer:          Ash Cardoso RDCS  Gender:            F                    Additional Staff:  Height:            157.00 cm            Admit Date:  Weight:            48.90 kg             Admission Status:     Inpatient -                                                                Routine  BSA / BMI:         1.47 m2 / 19.84      Encounter#:           [id number]                     kg/m2  Blood Pressure:    121/70 mmHg          Department Location:  [facility] Non                                                                Invasive     Study Type:    TRANSTHORACIC ECHO (TTE) COMPLETE  Diagnosis/ICD: Essential (primary) hypertension-I10  Indication:    hypertension  CPT Code:      Echo Complete w Full [id number]     Patient History:  Pertinent History: HTN.     Study Detail: The following Echo studies were performed: 2D, M-Mode, Doppler and                color flow.        PHYSICIAN INTERPRETATION:  Left Ventricle: Left ventricular ejection fraction is hyperdynamic, by visual estimate at 80%. There are no regional left ventricular wall motion abnormalities. The left ventricular cavity size is decreased. There is left ventricular concentric remodeling. Left ventricular diastolic filling was indeterminate.  Left Atrium: The left atrium is normal in size.  Right Ventricle: The right ventricle is moderately enlarged. There is normal right ventricular global systolic function.  Right Atrium: The right atrium is mildly dilated.  Aortic Valve: The aortic valve is trileaflet. 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 There is moderate to severe  aortic valve cusp calcification. There is evidence of moderate to severe aortic valve stenosis. The aortic valve dimensionless index is 0.33. There is no evidence of aortic valve regurgitation. The peak instantaneous gradient of the aortic valve is 64.3 mmHg. The mean gradient of the aortic valve is 35.0 mmHg.  Mitral Valve: The mitral valve is normal in structure. There is moderate mitral annular calcification. There is trace mitral valve regurgitation.  Tricuspid Valve: The tricuspid valve is structurally normal. There is mild tricuspid regurgitation. The Doppler estimated RVSP is severely elevated at 89.0 mmHg.  Pulmonic Valve: The pulmonic valve is structurally normal. There is no indication of pulmonic valve regurgitation.  Pericardium: There is no pericardial effusion noted.  Aorta: The aortic root is normal.  Systemic Veins: The inferior vena cava appears to be of normal size, IVC inspiratory collapse greater than 50%.  In comparison to the previous echocardiogram(s): Compared with study dated 12/27/2023, the degree of aortic stenosis has worsened. There is a higher estimated right ventricular systolic pressure.        CONCLUSIONS:   1. Left ventricular ejection fraction is hyperdynamic, by visual estimate at 80%.   2. Left ventricular diastolic filling was indeterminate.   3. Left ventricular cavity size is decreased.   4. There is normal right ventricular global systolic function.   5. Moderately enlarged right ventricle.   6. There is moderate mitral annular calcification.   7. Severely elevated right ventricular systolic pressure.   8. Moderate to severe aortic valve stenosis.   9. There is moderate to severe aortic valve cusp calcification.    Chest CT Scan     CT angio chest for pulmonary embolism 09/12/2024    Narrative  STUDY:  CT Angiogram of the Chest; 9/12/2024 4:22 PM  INDICATION:  Evaluate for pulmonary embolism.  COMPARISON:  XR chest 9/12/2024.  ACCESSION NUMBER(S):  [id number]  ORDERING  CLINICIAN:  ELICIA VEGA  TECHNIQUE:  CTA of the chest was performed with intravenous contrast.  75 Ml of omni 350.   Images are reviewed and processed at a  workstation according to the CT angiogram protocol with 3-D and/or MIP  post processing imaging generated.  Automated mA/kV exposure control was utilized and patient examination  was performed in strict accordance with principles of ALARA.  FINDINGS:  Pulmonary arteries are adequately opacified without acute or chronic  filling defects.  The thoracic aorta is normal in course and caliber  without dissection or aneurysm.  The heart is normal in size without pericardial effusion.  Thoracic  lymph nodes are not enlarged.  There is a moderately large  retrocardiac hiatal hernia.  There is no pleural effusion, pleural thickening, or pneumothorax.  The airways are patent.  There is significant central lobar emphysematous change throughout the  lungs most severe in the upper lobes but no definite consolidation or  lung nodules are appreciated.  There is some small linear scarring or  discoid atelectasis in the posterior left lower lobe millimeters  subpleural noncalcified nodule in the lateral left lung base just  above the diaphragm...  Upper abdomen demonstrates no acute pathology.  There is a 50% wedging of the T8 thoracic vertebral body of uncertain  age..  No suspicious bony lesions.    Impression  There does not appear to be any evidence of pulmonary embolus or  thoracic aortic aneurysm or dissection.  There is very severe central lobar emphysematous change in the lungs  with an 8 mm  noncalcified subpleural nodule in the inferior left lung  base laterally and some linear scarring or discoid atelectasis in the  posterior left lower lobe but there is no significant alveolar  consolidation and no effusion or pneumothorax..  There is moderately large retrocardiac hiatal hernia.  Signed by Suhail Manjarrez MD       Assessment and Plan / Recommendations:  Problem  List Items Addressed This Visit       COPD (chronic obstructive pulmonary disease) (Multi) - Primary    Current Assessment & Plan     Continue inhalers- trelegy daily and rinse after, use albuterol as needed every       Goal sat ~ 92%    Will do PFTs and Desaturation testing    You qualify for reliance study- will have my research team reach out to you regarding azitromycin vs. Daliresp    Your saturation level today was 65-79% on you level 5 O2 machine.  With it at 5 it went up to 82%.  On our tank at 10L anamika tup to 92-94%.      Will send you to the hospital for further workup of acute on chronic hypoxemic respiratory---> you may need re-evaluation of the O2 levels - pulmonary and cardiac issues           COPD exacerbation (Multi)    Relevant Orders    Referral to Pulmonary Rehabilitation    Complete Pulmonary Function Test (Spirometry/DLCO/Lung Volumes)    Oximetry Desat/O2 Titration (Exercise Desat)     Office #[phone], can use  option or nurses line    Page Pacheco # [phone]     Radiology Scheduling #[phone]     Patient sent to ER after waiting for direct admission for awhile- did discuss with inpatient Medicine team for admission with Pulm to consult.  Patient expressed understanding and agreement with plan.      Follow-up: Visit date not found     Lary Smith MD  11/05/2024

## 2024-11-05 NOTE — ED PROVIDER NOTES
HPI   Chief Complaint   Patient presents with    Shortness of Breath       This is a 74-year-old female who presents to the emergency department from her pulmonologist office due to low oxygen level.  Reports history of COPD and pulmonary hypertension.  She states that she was recently started on home O2.  She uses 3 L continuously.  The patient states that on arrival in her doctor's office her oxygen level was low at 65 to 79%.  She was increased to 5 L and her pulse ox went up to 82%.  She was then placed on 10 L which increased her pulse ox above 90%.  She was then sent to the emergency department.  The patient states that she feels short of breath with exertion but otherwise has no complaints.  She denies cough.  She denies fever.  She denies chest pain.    Past medical history: COPD, hypertension, hyperlipidemia, anxiety              Patient History   Past Medical History:   Diagnosis Date    Compression fracture of thoracic vertebra (Multi) 06/04/2012    Formatting of this note might be different from the original. T8    Encounter for screening for malignant neoplasm of colon     Encounter for screening colonoscopy    Encounter for screening for malignant neoplasm of vagina     Vaginal Pap smear    Other conditions influencing health status     Compression fracture    Pain in thoracic spine 06/04/2012    Personal history of other diseases of the digestive system     History of hiatal hernia    Personal history of other endocrine, nutritional and metabolic disease     History of hypercholesterolemia    Personal history of other medical treatment     History of screening mammography    Strain of muscle and tendon of back wall of thorax, initial encounter     Strain of thoracic spine    Wedge compression fracture of unspecified thoracic vertebra, initial encounter for closed fracture (Multi)     Thoracic compression fracture     Past Surgical History:   Procedure Laterality Date    OTHER SURGICAL HISTORY   10/30/2014    Colonoscopy (Fiberoptic) Screening     Family History   Problem Relation Name Age of Onset    Other (cardiac disorder) Mother      Diabetes Mother      Other (cardiac disorder) Father       Social History     Tobacco Use    Smoking status: Never     Passive exposure: Never    Smokeless tobacco: Never   Substance Use Topics    Alcohol use: Never    Drug use: Never       Physical Exam   ED Triage Vitals [11/05/24 1555]   Temperature Heart Rate Respirations BP   37 °C (98.6 °F) 100 20 129/55      Pulse Ox Temp Source Heart Rate Source Patient Position   (!) 92 % Temporal -- --      BP Location FiO2 (%)     -- --       Physical Exam  Vitals and nursing note reviewed.   HENT:      Head: Normocephalic and atraumatic.      Nose: Nose normal.   Eyes:      Conjunctiva/sclera: Conjunctivae normal.   Cardiovascular:      Rate and Rhythm: Normal rate and regular rhythm.      Pulses: Normal pulses.      Heart sounds: Normal heart sounds.   Pulmonary:      Effort: Pulmonary effort is normal.      Breath sounds: Decreased breath sounds present.   Abdominal:      General: Bowel sounds are normal.      Palpations: Abdomen is soft.   Musculoskeletal:         General: Normal range of motion.      Cervical back: Normal range of motion and neck supple.      Right lower leg: No edema.      Left lower leg: No edema.      Comments: No calf tenderness bilaterally   Skin:     Findings: No rash.   Neurological:      General: No focal deficit present.      Mental Status: She is alert and oriented to person, place, and time.   Psychiatric:         Mood and Affect: Mood normal.           ED Course & MDM   Diagnoses as of 11/06/24 0048   Hypoxia   Chronic obstructive pulmonary disease, unspecified COPD type (Multi)                 No data recorded     [place] Coma Scale Score: 15 (11/05/24 1554 : Sarah Roy RN)                           Medical Decision Making  Differential diagnosis: I have considered the following conditions in  my assessment of this patient's condition:  COPD, asthma, CHF, DKA, ACS, pneumonia, pneumothorax, pulmonary embolus, sepsis, bronchitis, foreign body aspiration.    This is a 74-year-old female who presents to the emergency department with low oxygen level.  She will be treated with DuoNebs and Solu-Medrol.  She was placed on high flow nasal cannula at 13 L.  This will be weaned down as her pulse ox is 100% on this setting patient.  The patient's white blood count was normal at 5.5.  Troponin was negative.  X-ray was read by the radiologist as extensive COPD changes.  Slight worsening patchy basilar at Pac[place]'s suggest possible edema or pneumonia.  The patient does not have fever, productive cough or white blood count.  Doubt pneumonia.  Dr. Gunderson was contacted who accepted the patient for admission.    Amount and/or Complexity of Data Reviewed  ECG/medicine tests: independent interpretation performed.     Details: Sinus rhythm, heart rate 101, right axis deviation, no ST elevation.  She will be treated with DuoNebs,        Procedure  Procedures     Rene Cai MD  11/06/24 0049

## 2024-11-05 NOTE — ASSESSMENT & PLAN NOTE
Continue inhalers- trelegy daily and rinse after, use albuterol as needed every       Goal sat ~ 92%    Will do PFTs and Desaturation testing    You qualify for reliance study- will have my research team reach out to you regarding azitromycin vs. Daliresp    Your saturation level today was 65-79% on you level 5 O2 machine.  With it at 5 it went up to 82%.  On our tank at 10L anamika tup to 92-94%.      Will send you to the hospital for further workup of acute on chronic hypoxemic respiratory---> you may need re-evaluation of the O2 levels - pulmonary and cardiac issues

## 2024-11-05 NOTE — H&P
Humera Villegas is a 74 y.o. female   Shortness of Breath       Patient with a past medical history of hypertension dyslipidemia COPD with chronic respiratory failure on 3 to 4 L of oxygen at home anxiety disorder history of osteoporosis currently on Prolia presented to her pulmonology office for a regular appointment where she was noted to have severe hypoxia needing 10 L of oxygen  Patient herself denied any shortness of breath or feeling any differently than she usually does  But because of the increased needs of oxygen she was recommended admission to the hospital  Patient denies any cough chest pain palpitations fevers chills etc.    Past Medical History  Past Medical History:   Diagnosis Date    Compression fracture of thoracic vertebra (Multi) 06/04/2012    Formatting of this note might be different from the original. T8    Encounter for screening for malignant neoplasm of colon     Encounter for screening colonoscopy    Encounter for screening for malignant neoplasm of vagina     Vaginal Pap smear    Other conditions influencing health status     Compression fracture    Pain in thoracic spine 06/04/2012    Personal history of other diseases of the digestive system     History of hiatal hernia    Personal history of other endocrine, nutritional and metabolic disease     History of hypercholesterolemia    Personal history of other medical treatment     History of screening mammography    Strain of muscle and tendon of back wall of thorax, initial encounter     Strain of thoracic spine    Wedge compression fracture of unspecified thoracic vertebra, initial encounter for closed fracture (Multi)     Thoracic compression fracture       Surgical History  Past Surgical History:   Procedure Laterality Date    OTHER SURGICAL HISTORY  10/30/2014    Colonoscopy (Fiberoptic) Screening        Social History  She reports that she has never smoked. She has never been exposed to tobacco smoke. She has never used smokeless  tobacco. She reports that she does not drink alcohol and does not use drugs.    Family History  Family History   Problem Relation Name Age of Onset    Other (cardiac disorder) Mother      Diabetes Mother      Other (cardiac disorder) Father          Allergies  Amoxicillin and Penicillin    Review of Systems   Respiratory:  Positive for shortness of breath.         Constitutional: not feeling poorly, no fever, no recent weight gain and no recent weight loss.   Eyes: no blurred vision and no diplopia.   ENT: no hearing loss, no tinnitus, no earache, no sore throat, no hoarseness and no swollen glands in the neck.   Cardiovascular: no chest pain, no tightness or heavy pressure, no shortness of breath, no palpitations and no lower extremity edema.   Respiratory: no cough, wheezing   Gastrointestinal: no change in bowel habits, no diarrhea, no constipation, no bloody stools, no nausea, no vomiting, no abdominal pain, no signs and symptoms of ulcer disease, no bonita colored stools and no intolerance to fatty foods.   Genitourinary: no urinary frequency, no dysuria, no hematuria, no burning sensation during urination, urinary stream is not smaller and urinary stream does not start and stop.   Musculoskeletal: no arthralgias, no joint stiffness, no muscle weakness, no back pain and no difficulty walking.   Skin: no rashes, no change in skin color and pigmentation, no skin lesions and no skin lumps.   Neurological: no headaches, no dizziness, no seizures, no tingling, no numbness, no signs and symptoms of stroke and no limb weakness.   Psychiatric: no confusion, no memory lapses or loss, no depression and no sleep disturbances.   Endocrine: no goiter, no thyroid disorder, no diabetes mellitus, no excessive thirst, no dry skin, no cold intolerance, no heat intolerance and no increased urinary frequency.   Hematologic/Lymphatic: is not slow to heal, does not bleed easily, does not bruise easily, no thrombophlebitis, no anemia  and no history of blood transfusion.   All other systems have been reviewed and are negative for complaint.     Vitals:    11/05/24 1807   BP: 130/62   Pulse: (!) 101   Resp: 18   Temp:    SpO2: 98%        Scheduled medications     Continuous medications     PRN medications  PRN medications: oxygen    Results from last 7 days   Lab Units 11/05/24  1646   WBC AUTO x10*3/uL 5.5   HEMOGLOBIN g/dL 14.6   HEMATOCRIT % 44.6   PLATELETS AUTO x10*3/uL 179     Results from last 7 days   Lab Units 11/05/24  1625   SODIUM mmol/L 136   POTASSIUM mmol/L 4.5   CHLORIDE mmol/L 100   CO2 mmol/L 29   BUN mg/dL 16   CREATININE mg/dL 0.71   CALCIUM mg/dL 9.6   GLUCOSE mg/dL 136*     Results from last 7 days   Lab Units 11/05/24  1625   TROPHS ng/L 7        XR chest 1 view   Final Result        Extensive COPD changes.        Slight worsening patchy basilar opacities suggest possible edema or   pneumonia.        Signed by: Nathaniel Paez 11/5/2024 6:02 PM   Dictation workstation:   [id number]          Physical Exam      Constitutional   General appearance: Alert and in no acute distress.   Eyes   Inspection of eyes: Sclera and conjunctiva were normal.    Pupil exam: Pupils were equal in size. Extraocular movements were intact.   Pulmonary   Respiratory assessment: No respiratory distress, normal respiratory rhythm and effort.    Auscultation of Lungs: Clear bilateral breath sounds.   Cardiovascular   Auscultation of heart: Apical pulse normal, heart rate and rhythm normal, normal S1 and S2, no murmurs and no pericardial rub.    Exam for edema: No peripheral edema.   Abdomen   Abdominal Exam: No bruits, normal bowel sounds, soft, non-tender, no abdominal mass palpated.    Liver and Spleen exam: No hepato-splenomegaly.   Musculoskeletal   Examination of gait: Normal.    Inspection of digits and nails: No clubbing or cyanosis of the fingernails.    Inspection/palpation of joints, bones and muscles: No joint swelling. Normal movement of all  extremities.   Skin   Skin inspection: Normal skin color and pigmentation, normal skin turgor and no visible rash.   Neurologic   Cranial nerves: Nerves 2-12 were intact, no focal neuro defects.   Psychiatric   Orientation: Oriented to person, place, and time.    Mood and affect: Normal.      Assessment/Plan      #Acute on chronic respiratory failure  Minimal wheezing on my exam  We will check a CT chest rule out PE to rule out other etiologies for the sudden worsening of hypoxia  We will start Solu-Medrol for now although minimal wheezing on auscultation    #COPD  Continue home inhalers    #Hypertension  Stable continue home medications    #Dyslipidemia  Continue statins

## 2024-11-06 LAB
ANION GAP SERPL CALC-SCNC: 14 MMOL/L (ref 10–20)
APPEARANCE UR: CLEAR
ATRIAL RATE: 101 BPM
BILIRUB UR STRIP.AUTO-MCNC: NEGATIVE MG/DL
BUN SERPL-MCNC: 16 MG/DL (ref 6–23)
CALCIUM SERPL-MCNC: 9.4 MG/DL (ref 8.6–10.3)
CHLORIDE SERPL-SCNC: 101 MMOL/L (ref 98–107)
CO2 SERPL-SCNC: 26 MMOL/L (ref 21–32)
COLOR UR: NORMAL
CREAT SERPL-MCNC: 0.65 MG/DL (ref 0.5–1.05)
EGFRCR SERPLBLD CKD-EPI 2021: >90 ML/MIN/1.73M*2
ERYTHROCYTE [DISTWIDTH] IN BLOOD BY AUTOMATED COUNT: 17.3 % (ref 11.5–14.5)
GLUCOSE SERPL-MCNC: 117 MG/DL (ref 74–99)
GLUCOSE UR STRIP.AUTO-MCNC: NORMAL MG/DL
HCT VFR BLD AUTO: 44.9 % (ref 36–46)
HGB BLD-MCNC: 14.4 G/DL (ref 12–16)
KETONES UR STRIP.AUTO-MCNC: NEGATIVE MG/DL
LEUKOCYTE ESTERASE UR QL STRIP.AUTO: NEGATIVE
MCH RBC QN AUTO: 30.8 PG (ref 26–34)
MCHC RBC AUTO-ENTMCNC: 32.1 G/DL (ref 32–36)
MCV RBC AUTO: 96 FL (ref 80–100)
NITRITE UR QL STRIP.AUTO: NEGATIVE
NRBC BLD-RTO: 0 /100 WBCS (ref 0–0)
P AXIS: -29 DEGREES
P OFFSET: 197 MS
P ONSET: 147 MS
PH UR STRIP.AUTO: 6.5 [PH]
PLATELET # BLD AUTO: 171 X10*3/UL (ref 150–450)
POTASSIUM SERPL-SCNC: 4.5 MMOL/L (ref 3.5–5.3)
PR INTERVAL: 144 MS
PROT UR STRIP.AUTO-MCNC: NEGATIVE MG/DL
Q ONSET: 219 MS
QRS COUNT: 17 BEATS
QRS DURATION: 70 MS
QT INTERVAL: 332 MS
QTC CALCULATION(BAZETT): 430 MS
QTC FREDERICIA: 395 MS
R AXIS: 207 DEGREES
RBC # BLD AUTO: 4.68 X10*6/UL (ref 4–5.2)
RBC # UR STRIP.AUTO: NEGATIVE /UL
SODIUM SERPL-SCNC: 136 MMOL/L (ref 136–145)
SP GR UR STRIP.AUTO: 1.02
T AXIS: -22 DEGREES
T OFFSET: 385 MS
UROBILINOGEN UR STRIP.AUTO-MCNC: NORMAL MG/DL
VENTRICULAR RATE: 101 BPM
WBC # BLD AUTO: 5 X10*3/UL (ref 4.4–11.3)

## 2024-11-06 PROCEDURE — 99223 1ST HOSP IP/OBS HIGH 75: CPT | Performed by: INTERNAL MEDICINE

## 2024-11-06 PROCEDURE — 2500000005 HC RX 250 GENERAL PHARMACY W/O HCPCS: Performed by: EMERGENCY MEDICINE

## 2024-11-06 PROCEDURE — 2500000004 HC RX 250 GENERAL PHARMACY W/ HCPCS (ALT 636 FOR OP/ED): Performed by: INTERNAL MEDICINE

## 2024-11-06 PROCEDURE — 2500000002 HC RX 250 W HCPCS SELF ADMINISTERED DRUGS (ALT 637 FOR MEDICARE OP, ALT 636 FOR OP/ED): Performed by: INTERNAL MEDICINE

## 2024-11-06 PROCEDURE — 99232 SBSQ HOSP IP/OBS MODERATE 35: CPT | Performed by: INTERNAL MEDICINE

## 2024-11-06 PROCEDURE — 85027 COMPLETE CBC AUTOMATED: CPT | Performed by: INTERNAL MEDICINE

## 2024-11-06 PROCEDURE — 36415 COLL VENOUS BLD VENIPUNCTURE: CPT | Performed by: INTERNAL MEDICINE

## 2024-11-06 PROCEDURE — 2500000001 HC RX 250 WO HCPCS SELF ADMINISTERED DRUGS (ALT 637 FOR MEDICARE OP): Performed by: INTERNAL MEDICINE

## 2024-11-06 PROCEDURE — 80048 BASIC METABOLIC PNL TOTAL CA: CPT | Performed by: INTERNAL MEDICINE

## 2024-11-06 PROCEDURE — 1200000002 HC GENERAL ROOM WITH TELEMETRY DAILY

## 2024-11-06 PROCEDURE — 94640 AIRWAY INHALATION TREATMENT: CPT

## 2024-11-06 PROCEDURE — 81003 URINALYSIS AUTO W/O SCOPE: CPT | Performed by: INTERNAL MEDICINE

## 2024-11-06 ASSESSMENT — ENCOUNTER SYMPTOMS
SHORTNESS OF BREATH: 1
FEVER: 0
WHEEZING: 0
COUGH: 0
FATIGUE: 0

## 2024-11-06 ASSESSMENT — COGNITIVE AND FUNCTIONAL STATUS - GENERAL
MOBILITY SCORE: 24
DAILY ACTIVITIY SCORE: 24

## 2024-11-06 ASSESSMENT — PAIN SCALES - GENERAL: PAINLEVEL_OUTOF10: 0 - NO PAIN

## 2024-11-06 NOTE — NURSING NOTE
Rapid Response RN Note    The following patient has a RADAR score of 9. [facility] T: 36.7 °C (98.1 °F) (Temporal), P: (!) 112, R: (!) 35, BP: 138/80, PulseOx: 92%      This RN Reviewed above VS with bedside RN.

## 2024-11-06 NOTE — PROGRESS NOTES
Humera Villegas is a 74 y.o. female     Patient feels okay this morning    Review of Systems     Constitutional: no fever, no chills, not feeling poorly, not feeling tired   Cardiovascular: no chest pain   Respiratory: no cough, wheezing   Gastrointestinal: no abdominal pain, no constipation, no melena, no nausea, no diarrhea, no vomiting and no blood in stools.   Neurological: no headache,   All other systems have been reviewed and are negative for complaint.       Vitals:    11/06/24 1156   BP:    Pulse:    Resp:    Temp:    SpO2: 91%        Scheduled medications  amLODIPine, 2.5 mg, oral, Daily  atorvastatin, 20 mg, oral, Daily  budesonide, 0.25 mg, nebulization, BID  enoxaparin, 40 mg, subcutaneous, q24h  ipratropium-albuteroL, 3 mL, nebulization, 4x daily  methylPREDNISolone sodium succinate (PF), 40 mg, intravenous, q12h  pantoprazole, 40 mg, oral, Daily before breakfast   Or  pantoprazole, 40 mg, intravenous, Daily before breakfast      Continuous medications     PRN medications  PRN medications: acetaminophen **OR** acetaminophen **OR** acetaminophen, albuterol, ALPRAZolam, guaiFENesin, melatonin, ondansetron **OR** ondansetron, oxygen, polyethylene glycol    Lab Review   Results from last 7 days   Lab Units 11/06/24 0543 11/05/24 1944 11/05/24  1646   WBC AUTO x10*3/uL 5.0 5.2 5.5   HEMOGLOBIN g/dL 14.4 14.3 14.6   HEMATOCRIT % 44.9 43.6 44.6   PLATELETS AUTO x10*3/uL 171 167 179     Results from last 7 days   Lab Units 11/06/24 0543 11/05/24 1944 11/05/24  1625   SODIUM mmol/L 136 137 136   POTASSIUM mmol/L 4.5 3.7 4.5   CHLORIDE mmol/L 101 103 100   CO2 mmol/L 26 25 29   BUN mg/dL 16 15 16   CREATININE mg/dL 0.65 0.67 0.71   CALCIUM mg/dL 9.4 9.1 9.6   GLUCOSE mg/dL 117* 179* 136*     Results from last 7 days   Lab Units 11/05/24  1625   TROPHS ng/L 7        CT angio chest for pulmonary embolism   Final Result   1. No evidence of acute pulmonary embolism.   2. Extensive bilateral emphysematous changes  noted.        MACRO:   None        Signed by: Haris Lopez 11/5/2024 9:01 PM   Dictation workstation:   [id number]      XR chest 1 view   Final Result        Extensive COPD changes.        Slight worsening patchy basilar opacities suggest possible edema or   pneumonia.        Signed by: Nathaniel Paez 11/5/2024 6:02 PM   Dictation workstation:   [id number]            Physical Exam    Constitutional   General appearance: Alert and in no acute distress.   Pulmonary   Respiratory assessment: No respiratory distress, normal respiratory rhythm and effort.    Auscultation of Lungs: Clear bilateral breath sounds.   Cardiovascular   Auscultation of heart: Apical pulse normal, heart rate and rhythm normal, normal S1 and S2, colic ejection murmur and no pericardial rub.    Exam for edema: No peripheral edema.   Abdomen   Abdominal Exam: No bruits, normal bowel sounds, soft, non-tender, no abdominal mass palpated.    Liver and Spleen exam: No hepato-splenomegaly.   Musculoskeletal   Examination of gait: Normal.    Inspection of digits and nails: No clubbing or cyanosis of the fingernails.    Inspection/palpation of joints, bones and muscles: No joint swelling. Normal movement of all extremities.   Neurologic   Cranial nerves: Nerves 2-12 were intact, no focal neuro defects.         Assessment/Plan      #Acute on chronic respiratory failure  CT chest negative for blood clots  Auscultation continues to show minimal wheezing  Unclear reasons for sudden rise and need for oxygen  Echocardiogram done earlier had shown severe pulmonary hypertension and moderate to severe aortic stenosis  ?  Possible pulmonary AV malformations  Will continue Solu-Medrol for now  And switch to oral steroid with a long taper  Discussed with pulmonary     #COPD  Continue home inhalers     #Hypertension  Stable continue home medications     #Dyslipidemia  Continue statins

## 2024-11-06 NOTE — CARE PLAN
Problem: Skin  Goal: Decreased wound size/increased tissue granulation at next dressing change  Outcome: Progressing  Goal: Participates in plan/prevention/treatment measures  Outcome: Progressing  Goal: Prevent/manage excess moisture  Outcome: Progressing  Goal: Prevent/minimize sheer/friction injuries  Outcome: Progressing  Goal: Promote/optimize nutrition  Outcome: Progressing  Goal: Promote skin healing  Outcome: Progressing     Problem: Pain - Adult  Goal: Verbalizes/displays adequate comfort level or baseline comfort level  Outcome: Progressing     Problem: Safety - Adult  Goal: Free from fall injury  Outcome: Progressing     Problem: Discharge Planning  Goal: Discharge to home or other facility with appropriate resources  Outcome: Progressing     Problem: Chronic Conditions and Co-morbidities  Goal: Patient's chronic conditions and co-morbidity symptoms are monitored and maintained or improved  Outcome: Progressing     Problem: Fall/Injury  Goal: Not fall by end of shift  Outcome: Progressing  Goal: Be free from injury by end of the shift  Outcome: Progressing  Goal: Verbalize understanding of personal risk factors for fall in the hospital  Outcome: Progressing  Goal: Verbalize understanding of risk factor reduction measures to prevent injury from fall in the home  Outcome: Progressing  Goal: Use assistive devices by end of the shift  Outcome: Progressing  Goal: Pace activities to prevent fatigue by end of the shift  Outcome: Progressing     Problem: Pain  Goal: Takes deep breaths with improved pain control throughout the shift  Outcome: Progressing  Goal: Turns in bed with improved pain control throughout the shift  Outcome: Progressing  Goal: Walks with improved pain control throughout the shift  Outcome: Progressing  Goal: Performs ADL's with improved pain control throughout shift  Outcome: Progressing  Goal: Participates in PT with improved pain control throughout the shift  Outcome: Progressing  Goal:  Free from opioid side effects throughout the shift  Outcome: Progressing  Goal: Free from acute confusion related to pain meds throughout the shift  Outcome: Progressing     Problem: Respiratory  Goal: Clear secretions with interventions this shift  Outcome: Progressing  Goal: Minimize anxiety/maximize coping throughout shift  Outcome: Progressing  Goal: Minimal/no exertional discomfort or dyspnea this shift  Outcome: Progressing  Goal: No signs of respiratory distress (eg. Use of accessory muscles. Peds grunting)  Outcome: Progressing  Goal: Patent airway maintained this shift  Outcome: Progressing  Goal: Tolerate mechanical ventilation evidenced by VS/agitation level this shift  Outcome: Progressing  Goal: Tolerate pulmonary toileting this shift  Outcome: Progressing  Goal: Verbalize decreased shortness of breath this shift  Outcome: Progressing  Goal: Wean oxygen to maintain O2 saturation per order/standard this shift  Outcome: Progressing  Goal: Increase self care and/or family involvement in next 24 hours  Outcome: Progressing

## 2024-11-06 NOTE — CARE PLAN
Problem: Skin  Goal: Decreased wound size/increased tissue granulation at next dressing change  Outcome: Progressing  Flowsheets (Taken 11/6/2024 1032)  Decreased wound size/increased tissue granulation at next dressing change:   Protective dressings over bony prominences   Utilize specialty bed per algorithm  Goal: Participates in plan/prevention/treatment measures  Outcome: Progressing  Goal: Prevent/manage excess moisture  Outcome: Progressing  Goal: Prevent/minimize sheer/friction injuries  Outcome: Progressing  Goal: Promote/optimize nutrition  Outcome: Progressing  Goal: Promote skin healing  Outcome: Progressing     Problem: Pain - Adult  Goal: Verbalizes/displays adequate comfort level or baseline comfort level  Outcome: Progressing     Problem: Safety - Adult  Goal: Free from fall injury  Outcome: Progressing     Problem: Discharge Planning  Goal: Discharge to home or other facility with appropriate resources  Outcome: Progressing     Problem: Chronic Conditions and Co-morbidities  Goal: Patient's chronic conditions and co-morbidity symptoms are monitored and maintained or improved  Outcome: Progressing     Problem: Fall/Injury  Goal: Not fall by end of shift  Outcome: Progressing  Goal: Be free from injury by end of the shift  Outcome: Progressing  Goal: Verbalize understanding of personal risk factors for fall in the hospital  Outcome: Progressing  Goal: Verbalize understanding of risk factor reduction measures to prevent injury from fall in the home  Outcome: Progressing  Goal: Use assistive devices by end of the shift  Outcome: Progressing  Goal: Pace activities to prevent fatigue by end of the shift  Outcome: Progressing     Problem: Pain  Goal: Takes deep breaths with improved pain control throughout the shift  Outcome: Progressing  Goal: Turns in bed with improved pain control throughout the shift  Outcome: Progressing  Goal: Walks with improved pain control throughout the shift  Outcome:  Progressing  Goal: Performs ADL's with improved pain control throughout shift  Outcome: Progressing  Goal: Participates in PT with improved pain control throughout the shift  Outcome: Progressing  Goal: Free from opioid side effects throughout the shift  Outcome: Progressing  Goal: Free from acute confusion related to pain meds throughout the shift  Outcome: Progressing     Problem: Respiratory  Goal: Clear secretions with interventions this shift  Outcome: Progressing  Goal: Minimize anxiety/maximize coping throughout shift  Outcome: Progressing  Goal: Minimal/no exertional discomfort or dyspnea this shift  Outcome: Progressing  Goal: No signs of respiratory distress (eg. Use of accessory muscles. Peds grunting)  Outcome: Progressing  Goal: Patent airway maintained this shift  Outcome: Progressing  Goal: Tolerate mechanical ventilation evidenced by VS/agitation level this shift  Outcome: Progressing  Goal: Tolerate pulmonary toileting this shift  Outcome: Progressing  Goal: Verbalize decreased shortness of breath this shift  Outcome: Progressing  Goal: Wean oxygen to maintain O2 saturation per order/standard this shift  Outcome: Progressing  Goal: Increase self care and/or family involvement in next 24 hours  Outcome: Progressing   The patient's goals for the shift include sleep    The clinical goals for the shift include Spo2 monitoring, medication, o2 therapy    Over the shift, the patient did not make progress toward the following goals. Barriers to progression include . Recommendations to address these barriers include .

## 2024-11-07 PROBLEM — R91.1 LUNG NODULE: Status: ACTIVE | Noted: 2024-11-07

## 2024-11-07 PROBLEM — J96.21 ACUTE ON CHRONIC HYPOXIC RESPIRATORY FAILURE (MULTI): Status: ACTIVE | Noted: 2024-09-15

## 2024-11-07 LAB
ANION GAP SERPL CALC-SCNC: 12 MMOL/L (ref 10–20)
BUN SERPL-MCNC: 20 MG/DL (ref 6–23)
CALCIUM SERPL-MCNC: 9.3 MG/DL (ref 8.6–10.3)
CARDIAC TROPONIN I PNL SERPL HS: 11 NG/L (ref 0–13)
CHLORIDE SERPL-SCNC: 103 MMOL/L (ref 98–107)
CO2 SERPL-SCNC: 27 MMOL/L (ref 21–32)
CREAT SERPL-MCNC: 0.62 MG/DL (ref 0.5–1.05)
EGFRCR SERPLBLD CKD-EPI 2021: >90 ML/MIN/1.73M*2
ERYTHROCYTE [DISTWIDTH] IN BLOOD BY AUTOMATED COUNT: 17.7 % (ref 11.5–14.5)
GLUCOSE SERPL-MCNC: 154 MG/DL (ref 74–99)
HCT VFR BLD AUTO: 42.1 % (ref 36–46)
HGB BLD-MCNC: 13.4 G/DL (ref 12–16)
MCH RBC QN AUTO: 31.1 PG (ref 26–34)
MCHC RBC AUTO-ENTMCNC: 31.8 G/DL (ref 32–36)
MCV RBC AUTO: 98 FL (ref 80–100)
NRBC BLD-RTO: 0 /100 WBCS (ref 0–0)
PLATELET # BLD AUTO: 159 X10*3/UL (ref 150–450)
POTASSIUM SERPL-SCNC: 4.5 MMOL/L (ref 3.5–5.3)
RBC # BLD AUTO: 4.31 X10*6/UL (ref 4–5.2)
SODIUM SERPL-SCNC: 137 MMOL/L (ref 136–145)
WBC # BLD AUTO: 11.3 X10*3/UL (ref 4.4–11.3)

## 2024-11-07 PROCEDURE — 2500000002 HC RX 250 W HCPCS SELF ADMINISTERED DRUGS (ALT 637 FOR MEDICARE OP, ALT 636 FOR OP/ED): Performed by: INTERNAL MEDICINE

## 2024-11-07 PROCEDURE — 99223 1ST HOSP IP/OBS HIGH 75: CPT | Performed by: INTERNAL MEDICINE

## 2024-11-07 PROCEDURE — 2500000001 HC RX 250 WO HCPCS SELF ADMINISTERED DRUGS (ALT 637 FOR MEDICARE OP): Performed by: NURSE PRACTITIONER

## 2024-11-07 PROCEDURE — 84484 ASSAY OF TROPONIN QUANT: CPT | Performed by: NURSE PRACTITIONER

## 2024-11-07 PROCEDURE — 2500000005 HC RX 250 GENERAL PHARMACY W/O HCPCS: Performed by: EMERGENCY MEDICINE

## 2024-11-07 PROCEDURE — 99232 SBSQ HOSP IP/OBS MODERATE 35: CPT | Performed by: INTERNAL MEDICINE

## 2024-11-07 PROCEDURE — 2500000001 HC RX 250 WO HCPCS SELF ADMINISTERED DRUGS (ALT 637 FOR MEDICARE OP): Performed by: INTERNAL MEDICINE

## 2024-11-07 PROCEDURE — 2500000004 HC RX 250 GENERAL PHARMACY W/ HCPCS (ALT 636 FOR OP/ED): Performed by: INTERNAL MEDICINE

## 2024-11-07 PROCEDURE — 80048 BASIC METABOLIC PNL TOTAL CA: CPT | Performed by: INTERNAL MEDICINE

## 2024-11-07 PROCEDURE — 99233 SBSQ HOSP IP/OBS HIGH 50: CPT | Performed by: INTERNAL MEDICINE

## 2024-11-07 PROCEDURE — 36415 COLL VENOUS BLD VENIPUNCTURE: CPT | Performed by: INTERNAL MEDICINE

## 2024-11-07 PROCEDURE — 1200000002 HC GENERAL ROOM WITH TELEMETRY DAILY

## 2024-11-07 PROCEDURE — 85027 COMPLETE CBC AUTOMATED: CPT | Performed by: INTERNAL MEDICINE

## 2024-11-07 PROCEDURE — 94640 AIRWAY INHALATION TREATMENT: CPT

## 2024-11-07 RX ORDER — PREDNISONE 20 MG/1
20 TABLET ORAL DAILY
Status: DISCONTINUED | OUTPATIENT
Start: 2024-11-11 | End: 2024-11-08 | Stop reason: HOSPADM

## 2024-11-07 RX ORDER — PREDNISONE 20 MG/1
40 TABLET ORAL DAILY
Status: DISCONTINUED | OUTPATIENT
Start: 2024-11-08 | End: 2024-11-08 | Stop reason: HOSPADM

## 2024-11-07 RX ORDER — PREDNISONE 10 MG/1
10 TABLET ORAL DAILY
Status: DISCONTINUED | OUTPATIENT
Start: 2024-11-15 | End: 2024-11-08 | Stop reason: HOSPADM

## 2024-11-07 RX ORDER — PREDNISONE 5 MG/1
5 TABLET ORAL DAILY
Status: DISCONTINUED | OUTPATIENT
Start: 2024-11-19 | End: 2024-11-08 | Stop reason: HOSPADM

## 2024-11-07 RX ORDER — FAMOTIDINE 20 MG/1
20 TABLET, FILM COATED ORAL ONCE
Status: COMPLETED | OUTPATIENT
Start: 2024-11-07 | End: 2024-11-07

## 2024-11-07 ASSESSMENT — PAIN SCALES - GENERAL
PAINLEVEL_OUTOF10: 0 - NO PAIN
PAINLEVEL_OUTOF10: 0 - NO PAIN

## 2024-11-07 ASSESSMENT — COGNITIVE AND FUNCTIONAL STATUS - GENERAL
DAILY ACTIVITIY SCORE: 24
MOBILITY SCORE: 24

## 2024-11-07 ASSESSMENT — PAIN - FUNCTIONAL ASSESSMENT: PAIN_FUNCTIONAL_ASSESSMENT: 0-10

## 2024-11-07 NOTE — PROGRESS NOTES
"Humera Villegas is a 74 y.o. female on day 2 of admission presenting with Hypoxia.    Subjective   Having occasional episodes of chest tightness. RT reported that patient was hypoxic to 82% on 4L/min prior to bronchodilator treatment. She states that the IV methylprednisolone makes her very jittery.       Objective     Physical Exam    Last Recorded Vitals  Blood pressure 109/64, pulse 95, temperature 35.8 °C (96.4 °F), temperature source Temporal, resp. rate 19, height 1.575 m (5' 2\"), weight 48.5 kg (107 lb), SpO2 95%.  Intake/Output last 3 Shifts:  I/O last 3 completed shifts:  In: 960 (19.8 mL/kg) [P.O.:960]  Out: 0 (0 mL/kg)   Weight: 48.5 kg     Relevant Results  Scheduled medications  amLODIPine, 2.5 mg, oral, Daily  atorvastatin, 20 mg, oral, Daily  budesonide, 0.25 mg, nebulization, BID  enoxaparin, 40 mg, subcutaneous, q24h  ipratropium-albuteroL, 3 mL, nebulization, 4x daily  methylPREDNISolone sodium succinate (PF), 40 mg, intravenous, q12h  pantoprazole, 40 mg, oral, Daily before breakfast   Or  pantoprazole, 40 mg, intravenous, Daily before breakfast      Continuous medications     PRN medications  PRN medications: acetaminophen **OR** acetaminophen **OR** acetaminophen, albuterol, ALPRAZolam, guaiFENesin, melatonin, ondansetron **OR** ondansetron, oxygen, polyethylene glycol    Results for orders placed or performed during the hospital encounter of 11/05/24 (from the past 24 hours)   Basic metabolic panel   Result Value Ref Range    Glucose 154 (H) 74 - 99 mg/dL    Sodium 137 136 - 145 mmol/L    Potassium 4.5 3.5 - 5.3 mmol/L    Chloride 103 98 - 107 mmol/L    Bicarbonate 27 21 - 32 mmol/L    Anion Gap 12 10 - 20 mmol/L    Urea Nitrogen 20 6 - 23 mg/dL    Creatinine 0.62 0.50 - 1.05 mg/dL    eGFR >90 >60 mL/min/1.73m*2    Calcium 9.3 8.6 - 10.3 mg/dL   CBC   Result Value Ref Range    WBC 11.3 4.4 - 11.3 x10*3/uL    nRBC 0.0 0.0 - 0.0 /100 WBCs    RBC 4.31 4.00 - 5.20 x10*6/uL    Hemoglobin 13.4 12.0 - " 16.0 g/dL    Hematocrit 42.1 36.0 - 46.0 %    MCV 98 80 - 100 fL    MCH 31.1 26.0 - 34.0 pg    MCHC 31.8 (L) 32.0 - 36.0 g/dL    RDW 17.7 (H) 11.5 - 14.5 %    Platelets 159 150 - 450 x10*3/uL                                     Assessment/Plan   Assessment & Plan  Acute on chronic hypoxic respiratory failure (Multi)    COPD exacerbation (Multi)    Lung nodule    Summary:  74 y.o. female admitted on 11/5/2024  4:07 PM for acute on chronic hypoxic respiratory failure likely due to AECOPD. She also has severe pulmonary HTN and moderate-severe aortic stenosis which are contributing. Her COPD is severe with most recent FEV1 of 40%. She qualifies for [place] Study and research team will contact her regarding roflumilast vs azithromycin. She has a LLL nodule measuring 8mm first noticed in September 2024.    Recommendations:  -Continue titrating O2 to maintain SpO2 between 89-92%. Currently on 4L/min. Will likely require higher O2 with ambulation. Recommend home O2 eval to better assess needs  -Agree with Duonebs QID and Budesonide BID  -Will switch steroids from IV to PO with prolonged 2-3 week taper until she gets enrolled in [place] Study  -Would benefit from pulmonary rehab  -Goals of care discussion. DNR/DNI would be reasonable given her severe and likely end-stage COPD  -Lung nodule stable for 2 months, will require repeat CT chest in next 3-6 months as outpatient  -No plans from cardiology for aortic valve replacement currently       Pat Dobbs, DO  Pulmonary & Critical Care  11/7/2024 12:11 PM

## 2024-11-07 NOTE — PROGRESS NOTES
Care Coordinator Note:  TCC spoke with patient regarding dc planning. Demo is correct .lives at home home alone, independently. Has Walker at home- does not use. Denies falls. Friend Jasbir takes to appts and will be ride at [place].   PCP is denice Chou seen Monday.   Pharm is [facility]. No AC prior to admission.   Wears reading glasses. Quit smoking 15 years ago. No etoh.     Plan: Patient in with COPD. CT neg for PE. On 4 liters NC. Pulm following. Iv steroids- will need 2-3 week taper at [place].  Cardio following- echo done.    Hypoxia this am when in room. RT at bedside.    Status: inpatient  Payor: [employer]  Disposition: Home alone with [facility] at [place].   Barrier: Pulm. IV steroids, hypoxia  ADOD: 1-3 days    Kathy Ballesteros [facility]      11/07/24 1234   Discharge Planning   Living Arrangements Alone   Support Systems Children   Assistance Needed IND with ADL   Type of Residence Private residence   Number of Stairs to Enter Residence 1   Number of Stairs Within Residence 12   Do you have animals or pets at home? No   Who is requesting discharge planning? Provider   Home or Post Acute Services In home services   Type of Home Care Services Home PT;Home OT   Expected Discharge Disposition Home Health   Does the patient need discharge transport arranged? Yes   RoundTrip coordination needed? Yes   Has discharge transport been arranged? No   Financial Resource Strain   How hard is it for you to pay for the very basics like food, housing, medical care, and heating? Not hard   Housing Stability   In the last 12 months, was there a time when you were not able to pay the mortgage or rent on time? N   At any time in the past 12 months, were you homeless or living in a shelter (including now)? N   Transportation Needs   In the past 12 months, has lack of transportation kept you from medical appointments or from getting medications? no   Patient Choice   Provider Choice list and CMS website  (https://medicare.gov/care-compare#search) for post-acute Quality and Resource Measure Data were provided and reviewed with: Patient   Patient / Family choosing to utilize agency / facility established prior to hospitalization Yes

## 2024-11-07 NOTE — PROGRESS NOTES
11/07/24 1237   [facility] Disability Status   Are you deaf or do you have serious difficulty hearing? N   Are you blind or do you have serious difficulty seeing, even when wearing glasses? N  (reading glasses)   Because of a physical, mental, or emotional condition, do you have serious difficulty concentrating, remembering, or making decisions? (5 years old or older) N   Do you have serious difficulty walking or climbing stairs? N   Do you have serious difficulty dressing or bathing? N   Because of a physical, mental, or emotional condition, do you have serious difficulty doing errands alone such as visiting the doctor? N

## 2024-11-07 NOTE — CONSULTS
Inpatient consult to Cardiology  Consult performed by: Mathew Jade, FRANCIA-CNP  Consult ordered by: Haris Moreno PA-C  Reason for consult: SOB, chest pain, hx of aortic stenosis.      History Of Present Illness:    Humera Villegas is a 74 y.o. female with past medical history significant for Moderate to severe aortic stenosis on TTE 9/2024, Hypertension, severe COPD/emphysema with chronic hypoxic respiratory failure on 3 L nasal cannula at home, Hyperlipidemia, pulmonary hypertension, pulmonary nodule, generalized anxiety disorder and osteoporosis. Presented with complains of shortness of breath and hypoxia.  Cardiology is consulted for SOB, chest pain, hx of aortic stenosis.     Patient was admitted to [facility] September 2024 with dyspnea which is was deemed  secondary to COPD exacerbation.  Echocardiogram at that time showed LVEF hyperdynamic 80% moderate to severe aortic stenosis severely elevated RVSP at 89 mmHg, moderately enlarged right ventricle with normal RV systolic function. She was eventually discharged home on oxygen. States initially after discharge she felt well however slowly started to get more short of breath.  She went to see her pulmonologist yesterday and was found to be hypoxic with pulse ox 65-79% on her home oxygen machine.  She was placed on 10L at oxygenation improved. She was referred to the ED for further workup on acute on chronic hypoxemic respiratory failure.     CT chest showed no pulmonary embolism or pericardial effusion.  Did show extensive bilateral emphysematous changes.  No mention of any pleural fluid.  High-sensitivity troponin negative  (previously 729 on 9/2024  6K4.5 BUN 16 creatinine 0.71 WBC 5.5 hemoglobin 14.6 hematocrit 45.6 platelets 179.  Initial vital signs temp 37.0  RR 20 /55 pulse ox 92% on nonrebreather.  She was treated with IV Solu-Medrol, DuoNebs. States yesterday she also started to noticed chest tightness with exertion or when her  "breathing gets labored. Discomfort only lasts a few minutes and improves o        Home CV meds  Amlodipine 2.5 mg daily  Atorvastatin 20 mg daily       Last Recorded Vitals:  Vitals:    11/07/24 0011 11/07/24 0439 11/07/24 0748 11/07/24 0857   BP: 105/57 133/66  109/64   BP Location: Left arm Left arm  Left arm   Patient Position: Lying Lying  Lying   Pulse:  95     Resp:  19     Temp: 36.3 °C (97.3 °F) 36.4 °C (97.5 °F)  35.8 °C (96.4 °F)   TempSrc: Temporal Temporal  Temporal   SpO2: 96% 95% 99% 96%   Weight:       Height:           Last Labs:  LABS:  CMP:  Results from last 7 days   Lab Units 11/07/24  0545 11/06/24  0543 11/05/24 1944 11/05/24  1625   SODIUM mmol/L 137 136 137 136   POTASSIUM mmol/L 4.5 4.5 3.7 4.5   CHLORIDE mmol/L 103 101 103 100   CO2 mmol/L 27 26 25 29   ANION GAP mmol/L 12 14 13 12   BUN mg/dL 20 16 15 16   CREATININE mg/dL 0.62 0.65 0.67 0.71   EGFR mL/min/1.73m*2 >90 >90 >90 88     CBC:  Results from last 7 days   Lab Units 11/07/24  0545 11/06/24  0543 11/05/24  1944 11/05/24  1646   WBC AUTO x10*3/uL 11.3 5.0 5.2 5.5   HEMOGLOBIN g/dL 13.4 14.4 14.3 14.6   HEMATOCRIT % 42.1 44.9 43.6 44.6   PLATELETS AUTO x10*3/uL 159 171 167 179   MCV fL 98 96 93 94     COAG:     ABO: No results found for: \"ABO\"  HEME/ENDO:     CARDIAC:   Results from last 7 days   Lab Units 11/05/24  1646 11/05/24  1625   TROPHS ng/L  --  7   BNP pg/mL 140*  --      Recent Labs     09/03/24  1201 12/04/23  1351 05/25/23  1207 11/11/22  1119 05/19/22  1142   CHOL 139 193 184 196 179   LDLF  --   --  90 103* 88   LDLCALC 64 99  --   --   --    HDL 56.5 78.9 75.1 77.3 74.2   TRIG 95 78 96 79 84        Last I/O:  I/O last 3 completed shifts:  In: 960 (19.8 mL/kg) [P.O.:960]  Out: 0 (0 mL/kg)   Weight: 48.5 kg     Past Cardiology Tests (Last 3 Years):  EKG:  Electrocardiogram 11/05/2024      Echo:  Transthoracic Echo (TTE) Complete 09/14/2024   1. Left ventricular ejection fraction is hyperdynamic, by visual estimate at " 80%.   2. Left ventricular diastolic filling was indeterminate.   3. Left ventricular cavity size is decreased.   4. There is normal right ventricular global systolic function.   5. Moderately enlarged right ventricle.   6. There is moderate mitral annular calcification.   7. Severely elevated right ventricular systolic pressure.   8. Moderate to severe aortic valve stenosis.   9. There is moderate to severe aortic valve cusp calcification.    Transthoracic Echo (TTE) Complete 12/27/2023   1. Left ventricular systolic function is hyperdynamic with a 70-75% estimated ejection fraction.   2. Spectral Doppler shows an impaired relaxation pattern of left ventricular diastolic filling.   3. Moderately elevated right ventricular systolic pressure.   4. Aortic valve appears abnormal.   5. Moderate aortic valve stenosis.    Ejection Fractions:  EF   Date/Time Value Ref Range Status   09/14/2024 03:50 PM 80 %    12/27/2023 02:46 PM 70       Cath:  No results found for this or any previous visit from the past 1095 days.    Stress Test:  No results found for this or any previous visit from the past 1095 days.    Cardiac Imaging:  No results found for this or any previous visit from the past 1095 days.      Past Medical History:  She has a past medical history of Compression fracture of thoracic vertebra (Multi) (06/04/2012), Encounter for screening for malignant neoplasm of colon, Encounter for screening for malignant neoplasm of vagina, Other conditions influencing health status, Pain in thoracic spine (06/04/2012), Personal history of other diseases of the digestive system, Personal history of other endocrine, nutritional and metabolic disease, Personal history of other medical treatment, Strain of muscle and tendon of back wall of thorax, initial encounter, and Wedge compression fracture of unspecified thoracic vertebra, initial encounter for closed fracture (Multi).    Past Surgical History:  She has a past surgical  history that includes Other surgical history (10/30/2014).      Social History:  She reports that she has never smoked. She has never been exposed to tobacco smoke. She has never used smokeless tobacco. She reports that she does not drink alcohol and does not use drugs.    Family History:  Family History   Problem Relation Name Age of Onset    Other (cardiac disorder) Mother      Diabetes Mother      Other (cardiac disorder) Father          Allergies:  Amoxicillin and Penicillin    Inpatient Medications:  Scheduled medications   Medication Dose Route Frequency    amLODIPine  2.5 mg oral Daily    atorvastatin  20 mg oral Daily    budesonide  0.25 mg nebulization BID    enoxaparin  40 mg subcutaneous q24h    ipratropium-albuteroL  3 mL nebulization 4x daily    methylPREDNISolone sodium succinate (PF)  40 mg intravenous q12h    pantoprazole  40 mg oral Daily before breakfast    Or    pantoprazole  40 mg intravenous Daily before breakfast     PRN medications   Medication    acetaminophen    Or    acetaminophen    Or    acetaminophen    albuterol    ALPRAZolam    guaiFENesin    melatonin    ondansetron    Or    ondansetron    oxygen    polyethylene glycol     Continuous Medications   Medication Dose Last Rate     Outpatient Medications:  Current Outpatient Medications   Medication Instructions    albuterol (Ventolin HFA) 90 mcg/actuation inhaler 2 puffs, inhalation, Every 4 hours PRN    ALPRAZolam (XANAX) 0.5 mg, oral, 3 times daily PRN    amLODIPine (NORVASC) 2.5 mg, oral, Daily    atorvastatin (LIPITOR) 20 mg, oral, Daily    cholecalciferol (Vitamin D3) 25 MCG (1000 UT) tablet 1 tablet, Daily    cyanocobalamin (VITAMIN B-12) 1,000 mcg, oral, Daily    denosumab (Prolia) 60 mg/mL syringe 1 mL, Every 6 months    fluticasone (Flonase) 50 mcg/actuation nasal spray 2 sprays, Each Nostril, Daily, Shake gently. Before first use, prime pump. After use, clean tip and replace cap.    fluticasone-umeclidin-vilanter (Trelegy  "Ellipta) 100-62.5-25 mcg blister with device 1 puff, inhalation, Daily    omeprazole (PRILOSEC) 20 mg, oral, Daily    oxygen (O2) 3 L/min, Continuous    silicone,dressing-foam bandage 9 X 9 \" bandage 1 patch, topical (top), Daily       Physical Exam:  GENERAL: alert, cooperative, pleasant, in no acute distress  SKIN: warm, dry  NECK: no JVD  CARDIAC: Tachycardiac  2/6 LAVELL best heard at RUSB  PULMONARY: Normal inspiratory efforts, Diminished lung sound on supplemental oxygen via 4L NC  ABDOMEN: soft, nondistended  EXTREMITIES: no lower extremity edema  NEURO: Alert and oriented x 3.  Grossly normal.  Moves all 4 extremities.     I reviewed telemetry which showed ST      Assessment/Plan   Humera Villegas is a 74 y.o. female with past medical history significant for Moderate to severe aortic stenosis on TTE 9/2024, Hypertension, severe COPD/emphysema with chronic hypoxic respiratory failure on 3 L nasal cannula at home, Hyperlipidemia, pulmonary hypertension, pulmonary nodule, generalized anxiety disorder and osteoporosis. Presented with complains of shortness of breath and hypoxia.  Cardiology is consulted for SOB, chest pain, hx of aortic stenosis.     Home CV meds: Amlodipine 2.5 mg daily Atorvastatin 20 mg daily    #Chest tightness/Dyspnea  #Moderate to severe aortic stenosis on TTE 9/2024  # Hypertension,  #Severe COPD/emphysema with chronic hypoxic respiratory failure   #Hyperlipidemia  #Pulmonary hypertension    Recommendations  Symptoms secondary to underlying severe COPD and aortic stenosis.   No indication for valve intervention at this time. She is not volume overloaded on exam.   Management of COPD per pulmonary     Patient has follow-up with Dr. Arroyo was scheduled on 11/18/2024      Code Status:  Full Code        Mathew Jade, APRN-CNP    STAFF ADDENDUM:    Both the RITA and I have had a face to face encounter with the patient today. I have examined the patient and edited the documented physical " examination as necessary.  I personally reviewed the patient's vital signs, telemetry, recent labs, medications, orders, EKGs, and pertinent cardiac imaging/ echocardiography.  I have reviewed the RITA's encounter note, approve the RITA's documentation and have edited the note to reflect my diagnostic and therapeutic plan.      74 y.o. female with past medical history significant for Moderate to severe aortic stenosis on TTE 9/2024, Hypertension, severe COPD/emphysema with chronic hypoxic respiratory failure on 3 L nasal cannula at home, Hyperlipidemia, pulmonary hypertension, pulmonary nodule, generalized anxiety disorder and osteoporosis. Presented with complains of shortness of breath and hypoxia.  Cardiology is consulted for SOB, chest pain, hx of aortic stenosis.     Presentation most consistent with a COPD exacerbation due to severity/end-stage.  Echocardiogram reviewed which does show the moderate to severe aortic stenosis although most parameters look more consistent with moderate.  Visually valve appears severe.  Suspect even valve replacement would not significantly improve her symptoms due to the severity of her COPD.  On exam she is not fluid overloaded, no interstitial edema on CT and BNP is lower than it was a month ago.  Troponins are negative.  No signs of acute decompensated heart failure.  Suspect chest pain more likely bronchospasm.  Continue supportive care cardiac wise and follow-up with her primary cardiologist.  Will sign off.  Please contact us back if can be of further assistance.      Alexis Nuno,

## 2024-11-07 NOTE — PROGRESS NOTES
Humera Villegas is a 74 y.o. female on day 2 of admission presenting with Hypoxia.      Subjective   Patient feeling better. Her oxygen is weaned down to 4L. She is saying that she has been having exertional chest tightness. No further complaints.    Objective   PE:  Constitutional: A&Ox3, calm and cooperative, NAD  Cardiovascular: Normal rate and regular rhythm.  Respiratory/Thorax: Good symmetric chest expansion. No labored breathing.  Gastrointestinal: Abdomen nonistended, soft, nontender  Genitourinary: Voiding independently   Extremities: No peripheral edema  Neurological: A&Ox3, No focal deficits  Psychological: Appropriate mood and behavior  Skin: Warm and dry    Last Recorded Vitals  /57 (BP Location: Left arm, Patient Position: Lying)   Pulse 95   Temp 36.6 °C (97.9 °F) (Temporal)   Resp 19   Wt 48.5 kg (107 lb)   SpO2 94%   Intake/Output last 3 Shifts:    Intake/Output Summary (Last 24 hours) at 11/7/2024 1512  Last data filed at 11/6/2024 1610  Gross per 24 hour   Intake 240 ml   Output 0 ml   Net 240 ml       Admission Weight  Weight: 48.5 kg (107 lb) (11/05/24 1555)    Daily Weight  11/05/24 : 48.5 kg (107 lb)    Image Results  Electrocardiogram, 12-lead PRN ACS symptoms  Sinus tachycardia  Right superior axis deviation  Possible Right ventricular hypertrophy  Nonspecific ST abnormality  Abnormal QRS-T angle, consider primary T wave abnormality  Abnormal ECG  Confirmed by Jose L Samaniego ([id number]) on 11/6/2024 8:40:51 AM    Relevant Results  Scheduled medications  amLODIPine, 2.5 mg, oral, Daily  atorvastatin, 20 mg, oral, Daily  budesonide, 0.25 mg, nebulization, BID  enoxaparin, 40 mg, subcutaneous, q24h  ipratropium-albuteroL, 3 mL, nebulization, 4x daily  pantoprazole, 40 mg, oral, Daily before breakfast   Or  pantoprazole, 40 mg, intravenous, Daily before breakfast  [START ON 11/8/2024] predniSONE, 40 mg, oral, Daily   Followed by  [START ON 11/11/2024] predniSONE, 20 mg, oral, Daily    Followed by  [START ON 11/15/2024] predniSONE, 10 mg, oral, Daily   Followed by  [START ON 11/19/2024] predniSONE, 5 mg, oral, Daily      Continuous medications     PRN medications  PRN medications: acetaminophen **OR** acetaminophen **OR** acetaminophen, albuterol, ALPRAZolam, guaiFENesin, melatonin, ondansetron **OR** ondansetron, oxygen, polyethylene glycol    Results for orders placed or performed during the hospital encounter of 11/05/24 (from the past 24 hours)   Basic metabolic panel   Result Value Ref Range    Glucose 154 (H) 74 - 99 mg/dL    Sodium 137 136 - 145 mmol/L    Potassium 4.5 3.5 - 5.3 mmol/L    Chloride 103 98 - 107 mmol/L    Bicarbonate 27 21 - 32 mmol/L    Anion Gap 12 10 - 20 mmol/L    Urea Nitrogen 20 6 - 23 mg/dL    Creatinine 0.62 0.50 - 1.05 mg/dL    eGFR >90 >60 mL/min/1.73m*2    Calcium 9.3 8.6 - 10.3 mg/dL   CBC   Result Value Ref Range    WBC 11.3 4.4 - 11.3 x10*3/uL    nRBC 0.0 0.0 - 0.0 /100 WBCs    RBC 4.31 4.00 - 5.20 x10*6/uL    Hemoglobin 13.4 12.0 - 16.0 g/dL    Hematocrit 42.1 36.0 - 46.0 %    MCV 98 80 - 100 fL    MCH 31.1 26.0 - 34.0 pg    MCHC 31.8 (L) 32.0 - 36.0 g/dL    RDW 17.7 (H) 11.5 - 14.5 %    Platelets 159 150 - 450 x10*3/uL   Troponin I, High Sensitivity   Result Value Ref Range    Troponin I, High Sensitivity 11 0 - 13 ng/L     Assessment/Plan     Humera Villegas is a 74 y.o. female on day 2 of admission presenting with Hypoxia.    Plan:  #Acute on chronic respiratory exacerbation  #COPD exacerbation  #Lung nodule  - Pulm following: Titrating NC to maintain saturation around 89-92%, currently on 4L saturating around 91-92%, Duonebs, Budesonide, switched to 16-day prednisone taper regimen. Her baseline is at 3L NC. Follow-up CT chest in 3-6 months OP to watch lung nodule.    #Aortic stenosis  #Exertional chest tightness  - Cardiology consult: Suspect from bronchospasm, follow-up OP cardiologist    #HTN  - Stable  - Home amlodipine    #HLD  - Home statin    Discussed  with Dr Gunderson.    Total time spent 35 minutes, and greater than 50% of time was spent in counseling/coordination of care.    DENISE KayeC

## 2024-11-07 NOTE — PROGRESS NOTES
Humera Villegas is a 74 y.o. female     Patient experiencing chest pressure on ambulation today  Was also nauseated after eating food    Review of Systems     Constitutional: no fever, no chills, not feeling poorly, not feeling tired   Cardiovascular: no chest pain   Respiratory: no cough, wheezing   Gastrointestinal: no abdominal pain, no constipation, no melena, no nausea, no diarrhea, no vomiting and no blood in stools.   Neurological: no headache,   All other systems have been reviewed and are negative for complaint.       Vitals:    11/07/24 1522   BP: 103/65   Pulse: 105   Resp: 17   Temp: 36.2 °C (97.2 °F)   SpO2: 97%        Scheduled medications  amLODIPine, 2.5 mg, oral, Daily  atorvastatin, 20 mg, oral, Daily  budesonide, 0.25 mg, nebulization, BID  enoxaparin, 40 mg, subcutaneous, q24h  ipratropium-albuteroL, 3 mL, nebulization, 4x daily  pantoprazole, 40 mg, oral, Daily before breakfast   Or  pantoprazole, 40 mg, intravenous, Daily before breakfast  [START ON 11/8/2024] predniSONE, 40 mg, oral, Daily   Followed by  [START ON 11/11/2024] predniSONE, 20 mg, oral, Daily   Followed by  [START ON 11/15/2024] predniSONE, 10 mg, oral, Daily   Followed by  [START ON 11/19/2024] predniSONE, 5 mg, oral, Daily      Continuous medications     PRN medications  PRN medications: acetaminophen **OR** acetaminophen **OR** acetaminophen, albuterol, ALPRAZolam, guaiFENesin, melatonin, ondansetron **OR** ondansetron, oxygen, polyethylene glycol    Lab Review   Results from last 7 days   Lab Units 11/07/24 0545 11/06/24 0543 11/05/24 1944   WBC AUTO x10*3/uL 11.3 5.0 5.2   HEMOGLOBIN g/dL 13.4 14.4 14.3   HEMATOCRIT % 42.1 44.9 43.6   PLATELETS AUTO x10*3/uL 159 171 167     Results from last 7 days   Lab Units 11/07/24 0545 11/06/24 0543 11/05/24 1944   SODIUM mmol/L 137 136 137   POTASSIUM mmol/L 4.5 4.5 3.7   CHLORIDE mmol/L 103 101 103   CO2 mmol/L 27 26 25   BUN mg/dL 20 16 15   CREATININE mg/dL 0.62 0.65 0.67    CALCIUM mg/dL 9.3 9.4 9.1   GLUCOSE mg/dL 154* 117* 179*     Results from last 7 days   Lab Units 11/07/24  0545 11/05/24  1625   TROPHS ng/L 11 7        CT angio chest for pulmonary embolism   Final Result   1. No evidence of acute pulmonary embolism.   2. Extensive bilateral emphysematous changes noted.        MACRO:   None        Signed by: Haris Lopez 11/5/2024 9:01 PM   Dictation workstation:   [id number]      XR chest 1 view   Final Result        Extensive COPD changes.        Slight worsening patchy basilar opacities suggest possible edema or   pneumonia.        Signed by: Nathaniel Paez 11/5/2024 6:02 PM   Dictation workstation:   [id number]            Physical Exam    Constitutional   General appearance: Alert and in no acute distress.   Pulmonary   Respiratory assessment: No respiratory distress, normal respiratory rhythm and effort.    Auscultation of Lungs: Clear bilateral breath sounds.   Cardiovascular   Auscultation of heart: Apical pulse normal, heart rate and rhythm normal, normal S1 and S2, colic ejection murmur and no pericardial rub.    Exam for edema: No peripheral edema.   Abdomen   Abdominal Exam: No bruits, normal bowel sounds, soft, non-tender, no abdominal mass palpated.    Liver and Spleen exam: No hepato-splenomegaly.   Musculoskeletal   Examination of gait: Normal.    Inspection of digits and nails: No clubbing or cyanosis of the fingernails.    Inspection/palpation of joints, bones and muscles: No joint swelling. Normal movement of all extremities.   Neurologic   Cranial nerves: Nerves 2-12 were intact, no focal neuro defects.         Assessment/Plan      #Acute on chronic respiratory failure  Continue Solu-Medrol  Obtain cardiology consult because of chest pressure and known echo showing moderate severity stenosis  Hopefully home soon after clearance     #COPD  Continue home inhalers     #Hypertension  Stable continue home medications     #Dyslipidemia  Continue statins

## 2024-11-08 VITALS
HEART RATE: 108 BPM | SYSTOLIC BLOOD PRESSURE: 138 MMHG | OXYGEN SATURATION: 92 % | HEIGHT: 62 IN | WEIGHT: 107 LBS | RESPIRATION RATE: 18 BRPM | TEMPERATURE: 97.9 F | BODY MASS INDEX: 19.69 KG/M2 | DIASTOLIC BLOOD PRESSURE: 77 MMHG

## 2024-11-08 LAB
ANION GAP SERPL CALC-SCNC: 10 MMOL/L (ref 10–20)
BUN SERPL-MCNC: 24 MG/DL (ref 6–23)
CALCIUM SERPL-MCNC: 9 MG/DL (ref 8.6–10.3)
CHLORIDE SERPL-SCNC: 103 MMOL/L (ref 98–107)
CO2 SERPL-SCNC: 28 MMOL/L (ref 21–32)
CREAT SERPL-MCNC: 0.59 MG/DL (ref 0.5–1.05)
EGFRCR SERPLBLD CKD-EPI 2021: >90 ML/MIN/1.73M*2
ERYTHROCYTE [DISTWIDTH] IN BLOOD BY AUTOMATED COUNT: 18 % (ref 11.5–14.5)
GLUCOSE SERPL-MCNC: 67 MG/DL (ref 74–99)
HCT VFR BLD AUTO: 43.1 % (ref 36–46)
HGB BLD-MCNC: 13.3 G/DL (ref 12–16)
MAGNESIUM SERPL-MCNC: 2.07 MG/DL (ref 1.6–2.4)
MCH RBC QN AUTO: 29.8 PG (ref 26–34)
MCHC RBC AUTO-ENTMCNC: 30.9 G/DL (ref 32–36)
MCV RBC AUTO: 97 FL (ref 80–100)
NRBC BLD-RTO: 0 /100 WBCS (ref 0–0)
PLATELET # BLD AUTO: 179 X10*3/UL (ref 150–450)
POTASSIUM SERPL-SCNC: 3.8 MMOL/L (ref 3.5–5.3)
RBC # BLD AUTO: 4.46 X10*6/UL (ref 4–5.2)
SODIUM SERPL-SCNC: 137 MMOL/L (ref 136–145)
WBC # BLD AUTO: 8.6 X10*3/UL (ref 4.4–11.3)

## 2024-11-08 PROCEDURE — 85027 COMPLETE CBC AUTOMATED: CPT | Performed by: INTERNAL MEDICINE

## 2024-11-08 PROCEDURE — 99239 HOSP IP/OBS DSCHRG MGMT >30: CPT | Performed by: INTERNAL MEDICINE

## 2024-11-08 PROCEDURE — 2500000004 HC RX 250 GENERAL PHARMACY W/ HCPCS (ALT 636 FOR OP/ED): Performed by: INTERNAL MEDICINE

## 2024-11-08 PROCEDURE — 94761 N-INVAS EAR/PLS OXIMETRY MLT: CPT

## 2024-11-08 PROCEDURE — 36415 COLL VENOUS BLD VENIPUNCTURE: CPT | Performed by: INTERNAL MEDICINE

## 2024-11-08 PROCEDURE — 94640 AIRWAY INHALATION TREATMENT: CPT

## 2024-11-08 PROCEDURE — 83735 ASSAY OF MAGNESIUM: CPT

## 2024-11-08 PROCEDURE — 2500000002 HC RX 250 W HCPCS SELF ADMINISTERED DRUGS (ALT 637 FOR MEDICARE OP, ALT 636 FOR OP/ED): Performed by: INTERNAL MEDICINE

## 2024-11-08 PROCEDURE — 80048 BASIC METABOLIC PNL TOTAL CA: CPT | Performed by: INTERNAL MEDICINE

## 2024-11-08 PROCEDURE — 2500000001 HC RX 250 WO HCPCS SELF ADMINISTERED DRUGS (ALT 637 FOR MEDICARE OP): Performed by: INTERNAL MEDICINE

## 2024-11-08 PROCEDURE — 2500000005 HC RX 250 GENERAL PHARMACY W/O HCPCS: Performed by: EMERGENCY MEDICINE

## 2024-11-08 PROCEDURE — 99233 SBSQ HOSP IP/OBS HIGH 50: CPT | Performed by: INTERNAL MEDICINE

## 2024-11-08 RX ORDER — IPRATROPIUM BROMIDE AND ALBUTEROL SULFATE 2.5; .5 MG/3ML; MG/3ML
3 SOLUTION RESPIRATORY (INHALATION)
Qty: 360 ML | Refills: 0 | Status: SHIPPED | OUTPATIENT
Start: 2024-11-08 | End: 2024-11-09 | Stop reason: SDUPTHER

## 2024-11-08 RX ORDER — PREDNISONE 10 MG/1
TABLET ORAL
Qty: 27 TABLET | Refills: 0 | Status: SHIPPED | OUTPATIENT
Start: 2024-11-08 | End: 2024-11-09 | Stop reason: SDUPTHER

## 2024-11-08 ASSESSMENT — PAIN SCALES - GENERAL: PAINLEVEL_OUTOF10: 0 - NO PAIN

## 2024-11-08 NOTE — SIGNIFICANT EVENT
11/8 spo2 95% on 3 liters at rest,pt ambulated  as far as she does at home,walked in rom w 3 back/forths,spo2 84%-85% on 3,increased to 6 liters w spo2 93% will let pulmonary aware

## 2024-11-08 NOTE — DISCHARGE SUMMARY
Discharge Diagnosis  Hypoxia    Issues Requiring Follow-Up  Follow-up with pulmonary    Test Results Pending At Discharge  Pending Labs       No current pending labs.            Hospital Course  Patient with a past medical history of hypertension dyslipidemia COPD with chronic respiratory failure on 3 to 4 L of oxygen at home anxiety disorder history of osteoporosis currently on Prolia presented to her pulmonology office for a regular appointment where she was noted to have severe hypoxia needing 10 L of oxygen  Patient herself denied any shortness of breath or feeling any differently than she usually does  But because of the increased needs of oxygen she was recommended admission to the hospital  Patient denies any cough chest pain palpitations fevers chills etc.  We started the patient on Solu-Medrol  We also obtain cardiology input if her aortic stenosis could be contributing to symptoms  They have feel that her symptoms are more pulmonary  Started on steroid taper  Will slowly taper off steroids  Patient is end-stage COPD  She will benefit from outpatient pulmonary rehab  Will discharge the patient home on her home oxygen at 4 L in stable condition    Pertinent Physical Exam At Time of Discharge  Physical Exam    Constitutional   General appearance: Alert and in no acute distress.     Pulmonary   Respiratory assessment: No respiratory distress, normal respiratory rhythm and effort.    Auscultation of Lungs: Clear bilateral breath sounds.   Cardiovascular   Auscultation of heart: Apical pulse normal, heart rate and rhythm normal, normal S1 and S2, no murmurs and no pericardial rub.    Exam for edema: No peripheral edema.   Abdomen   Abdominal Exam: No bruits, normal bowel sounds, soft, non-tender, no abdominal mass palpated.    Liver and Spleen exam: No hepato-splenomegaly.   Musculoskeletal   Examination of gait: Normal.    Inspection of digits and nails: No clubbing or cyanosis of the fingernails.   "  Inspection/palpation of joints, bones and muscles: No joint swelling. Normal movement of all extremities.   Skin   Skin inspection: Normal skin color and pigmentation, normal skin turgor and no visible rash.   Neurologic   Cranial nerves: Nerves 2-12 were intact, no focal neuro defects.    Home Medications     Medication List      START taking these medications     ipratropium-albuteroL 0.5-2.5 mg/3 mL nebulizer solution; Commonly known   as: Duo-Neb; Take 3 mL by nebulization 4 times a day.     CHANGE how you take these medications     omeprazole 20 mg DR capsule; Commonly known as: PriLOSEC; TAKE 1 CAPSULE   DAILY; What changed: when to take this, reasons to take this     CONTINUE taking these medications     albuterol 90 mcg/actuation inhaler; Commonly known as: Ventolin HFA;   Inhale 2 puffs every 4 hours if needed for wheezing or shortness of   breath.   amLODIPine 2.5 mg tablet; Commonly known as: Norvasc; TAKE 1 TABLET   DAILY   atorvastatin 20 mg tablet; Commonly known as: Lipitor; TAKE 1 TABLET   DAILY   cyanocobalamin 1,000 mcg tablet; Commonly known as: Vitamin B-12; Take 1   tablet (1,000 mcg) by mouth once daily.   fluticasone 50 mcg/actuation nasal spray; Commonly known as: Flonase;   Administer 2 sprays into each nostril once daily. Shake gently. Before   first use, prime pump. After use, clean tip and replace cap.   oxygen gas therapy; Commonly known as: O2   Prolia 60 mg/mL syringe; Generic drug: denosumab   silicone,dressing-foam bandage 9 X 9 \" bandage; Apply 1 patch topically   once daily.   Trelegy Ellipta 100-62.5-25 mcg blister with device; Generic drug:   fluticasone-umeclidin-vilanter; Inhale 1 puff once daily.   Vitamin D3 25 MCG (1000 UT) tablet; Generic drug: cholecalciferol     STOP taking these medications     ALPRAZolam 0.5 mg tablet; Commonly known as: Xanax       Outpatient Follow-Up  Future Appointments   Date Time Provider Department Center   11/9/2024 To Be Determined Sweta BARBOSA" ALYSSA [facility]   11/15/2024 To Be Determined Dahlia Hannah RN [facility]   11/18/2024  1:00 PM Micheal Arroyo MD [id number]   11/22/2024 To Be Determined Dahlia Hannah RN [facility]   1/27/2025  1:00 PM Fabio Chou MD [id number] [facility]     Patient seen at bedside. Events from the last visit reviewed. Discussed with staff. Results of tests and investigations from last visit reviewed and discussed with patient/Family. Electronic chart on [facility] reviewed. Input / Recommendations  from consultants  appreciated and reviewed and agreed with.     discharge summary and profile completed. medications reviewed and discussed with patient and family.  scripts completed and signed.     total discharge time in excess of 30 minutes.    Uriel Gunderson MD

## 2024-11-08 NOTE — DISCHARGE INSTRUCTIONS
[facility] Observation Unit Discharge Instructions  You came to the hospital with shortness of breath 2/2 COPD exacerbation and were admitted for observation and further care.   You were treated with Steroids and breathing treatments.   A pulmonary specialist saw you while admitted and helped manage your care.   Your discharge plan is to go home to recover with supplemental oxygen increased to 6L when ambulating, and Nebulizer treatments.    Please see your primary care doctor in 1 week for follow-up.   An appointment has been requested for you but may you need to call your doctors office to schedule.    Additionally, a referral has been ordered for you to follow up with PCP and pulmonology.  Recommended CT chest in 3-6 months.   The office or scheduling department should call you to arrange an appointment in the next week or two.     For any issues or concerns with appointments, call the  scheduling line at [phone] or the providers office directly.       See the attached information for education about any new medications and the condition(s) you were treated for.  Your medications may have changed so pay close attention to the list given to you at discharge and ask any questions you have before leaving the hospital.

## 2024-11-08 NOTE — PROGRESS NOTES
"Humera Villegas is a 74 y.o. female on day 3 of admission presenting with Hypoxia.    Subjective   Feeling better. Less jittery now that her steroids are have been switched from IV to PO. Down to 3L/min. Had home O2 study and she will need 6L/min for ambulation as her SpO2 was 84-85% on 3L/min with ambulation.     Objective   GEN: breathing well.  CV: RRR, prominent S2  LUNGS: good effort, quiet bilateral breath sounds  EXT: no edema, cyanosis, clubbing      Physical Exam    Last Recorded Vitals  Blood pressure 141/70, pulse 110, temperature 36.6 °C (97.9 °F), temperature source Temporal, resp. rate 18, height 1.575 m (5' 2\"), weight 48.5 kg (107 lb), SpO2 93%.  Intake/Output last 3 Shifts:  No intake/output data recorded.    Relevant Results  Scheduled medications  amLODIPine, 2.5 mg, oral, Daily  atorvastatin, 20 mg, oral, Daily  budesonide, 0.25 mg, nebulization, BID  enoxaparin, 40 mg, subcutaneous, q24h  ipratropium-albuteroL, 3 mL, nebulization, 4x daily  pantoprazole, 40 mg, oral, Daily before breakfast   Or  pantoprazole, 40 mg, intravenous, Daily before breakfast  predniSONE, 40 mg, oral, Daily   Followed by  [START ON 11/11/2024] predniSONE, 20 mg, oral, Daily   Followed by  [START ON 11/15/2024] predniSONE, 10 mg, oral, Daily   Followed by  [START ON 11/19/2024] predniSONE, 5 mg, oral, Daily      Continuous medications     PRN medications  PRN medications: acetaminophen **OR** acetaminophen **OR** acetaminophen, albuterol, ALPRAZolam, guaiFENesin, melatonin, ondansetron **OR** ondansetron, oxygen, polyethylene glycol    Results for orders placed or performed during the hospital encounter of 11/05/24 (from the past 24 hours)   Basic metabolic panel   Result Value Ref Range    Glucose 67 (L) 74 - 99 mg/dL    Sodium 137 136 - 145 mmol/L    Potassium 3.8 3.5 - 5.3 mmol/L    Chloride 103 98 - 107 mmol/L    Bicarbonate 28 21 - 32 mmol/L    Anion Gap 10 10 - 20 mmol/L    Urea Nitrogen 24 (H) 6 - 23 mg/dL    " Creatinine 0.59 0.50 - 1.05 mg/dL    eGFR >90 >60 mL/min/1.73m*2    Calcium 9.0 8.6 - 10.3 mg/dL   CBC   Result Value Ref Range    WBC 8.6 4.4 - 11.3 x10*3/uL    nRBC 0.0 0.0 - 0.0 /100 WBCs    RBC 4.46 4.00 - 5.20 x10*6/uL    Hemoglobin 13.3 12.0 - 16.0 g/dL    Hematocrit 43.1 36.0 - 46.0 %    MCV 97 80 - 100 fL    MCH 29.8 26.0 - 34.0 pg    MCHC 30.9 (L) 32.0 - 36.0 g/dL    RDW 18.0 (H) 11.5 - 14.5 %    Platelets 179 150 - 450 x10*3/uL   Magnesium   Result Value Ref Range    Magnesium 2.07 1.60 - 2.40 mg/dL                                     Assessment/Plan   Assessment & Plan  Acute on chronic hypoxic respiratory failure (Multi)    COPD exacerbation (Multi)    Lung nodule    Summary:  74 y.o. female admitted on 11/5/2024  4:07 PM for acute on chronic hypoxic respiratory failure likely due to AECOPD. She also has severe pulmonary HTN and moderate-severe aortic stenosis which are contributing. Her COPD is severe with most recent FEV1 of 40%. She qualifies for [place] Study and research team will contact her regarding roflumilast vs azithromycin. She has a LLL nodule measuring 8mm first noticed in September 2024.     Recommendations:  -Continue titrating O2 to maintain SpO2 between 89-92%. Currently on 3L/min (baseline). Requires 6L/min with ambulation. Updated O2 Rx sent to [facility]  -Agree with Duonebs QID and Budesonide BID. Can resume Trelegy Ellipta  -Writing Rx for home nebulizer. Can have Duonebs QID PRN as outpatient  -Continue PO prednisone with taper as prescribed upon discharge until she gets enrolled in [place] Study  -Would benefit from pulmonary rehab  -Goals of care discussion. DNR/DNI would be reasonable given her severe and likely end-stage COPD  -Lung nodule stable for 2 months, will require repeat CT chest in next 3-6 months as outpatient  -No plans from cardiology for aortic valve replacement currently  -Can follow up with me at [facility] in the next month after discharge  -Ok to go home  from pulmonary standpoint. Will sign off          Pat Dobbs, DO  Pulmonary & Critical Care  11/8/2024 12:26 PM

## 2024-11-08 NOTE — PROGRESS NOTES
11/08/24 1049   Discharge Planning   Assistance Needed follow up w/patient re; discharge planning needs-patient has her portable oxygen tank at bedside, only wants [facility]-PT-cnp updated, private duty resources provided   Expected Discharge Disposition Home Health   Does the patient need discharge transport arranged? No  (patient will call for ride home)

## 2024-11-09 ENCOUNTER — HOME CARE VISIT (OUTPATIENT)
Dept: HOME HEALTH SERVICES | Facility: HOME HEALTH | Age: 74
End: 2024-11-09
Payer: MEDICARE

## 2024-11-09 DIAGNOSIS — J44.1 COPD EXACERBATION (MULTI): ICD-10-CM

## 2024-11-09 DIAGNOSIS — J43.9 PULMONARY EMPHYSEMA, UNSPECIFIED EMPHYSEMA TYPE (MULTI): ICD-10-CM

## 2024-11-09 RX ORDER — PEN NEEDLE, DIABETIC 31 GX5/16"
NEEDLE, DISPOSABLE MISCELLANEOUS
Qty: 1 EACH | Refills: 0 | Status: SHIPPED | OUTPATIENT
Start: 2024-11-09

## 2024-11-09 RX ORDER — IPRATROPIUM BROMIDE AND ALBUTEROL SULFATE 2.5; .5 MG/3ML; MG/3ML
3 SOLUTION RESPIRATORY (INHALATION)
Qty: 360 ML | Refills: 0 | Status: SHIPPED | OUTPATIENT
Start: 2024-11-09 | End: 2024-12-09

## 2024-11-09 RX ORDER — PREDNISONE 10 MG/1
TABLET ORAL
Qty: 27 TABLET | Refills: 0 | Status: SHIPPED | OUTPATIENT
Start: 2024-11-09 | End: 2024-11-24

## 2024-11-09 RX ORDER — PEN NEEDLE, DIABETIC 31 GX5/16"
NEEDLE, DISPOSABLE MISCELLANEOUS
Qty: 1 EACH | Refills: 0 | Status: SHIPPED | OUTPATIENT
Start: 2024-11-09 | End: 2024-11-09 | Stop reason: SDUPTHER

## 2024-11-10 RX ORDER — ALBUTEROL SULFATE 0.83 MG/ML
2.5 SOLUTION RESPIRATORY (INHALATION) EVERY 4 HOURS PRN
Qty: 1080 ML | Refills: 1 | OUTPATIENT
Start: 2024-11-10

## 2024-11-10 RX ORDER — PREDNISONE 10 MG/1
TABLET ORAL
Refills: 0 | OUTPATIENT
Start: 2024-11-10

## 2024-11-10 ASSESSMENT — ENCOUNTER SYMPTOMS
CHEST TIGHTNESS: 1
CHILLS: 0
MYALGIAS: 1
FEVER: 0
PALPITATIONS: 0
ALLERGIC/IMMUNOLOGIC NEGATIVE: 1
NAUSEA: 0
WHEEZING: 1
SHORTNESS OF BREATH: 1
CHOKING: 0
NERVOUS/ANXIOUS: 1
HEMATOLOGIC/LYMPHATIC NEGATIVE: 1
VOMITING: 0
ENDOCRINE NEGATIVE: 1
GASTROINTESTINAL NEGATIVE: 1
JOINT SWELLING: 1
CARDIOVASCULAR NEGATIVE: 1
STRIDOR: 1
APNEA: 0

## 2024-11-11 ENCOUNTER — TELEPHONE (OUTPATIENT)
Dept: CARDIAC REHAB | Facility: CLINIC | Age: 74
End: 2024-11-11
Payer: MEDICARE

## 2024-11-11 ENCOUNTER — PATIENT OUTREACH (OUTPATIENT)
Dept: PRIMARY CARE | Facility: CLINIC | Age: 74
End: 2024-11-11
Payer: MEDICARE

## 2024-11-11 ENCOUNTER — HOME CARE VISIT (OUTPATIENT)
Dept: HOME HEALTH SERVICES | Facility: HOME HEALTH | Age: 74
End: 2024-11-11
Payer: MEDICARE

## 2024-11-11 NOTE — ASSESSMENT & PLAN NOTE
Pulmnology follow up in AM  C/w Trelegy and O2   #HLD  -Continue home medications including amlodipine 2.5 mg PO daily and atorvastatin 20mg PO daily      #anxiety disorder  -Continue home medications including Xanax 0.5mg PO TID PRN      #DVT prophlaxis  -Lovenox

## 2024-11-11 NOTE — PROGRESS NOTES
Discharge Facility: [facility]  Discharge Diagnosis:  hypoxia  Admission Date: 11/5/24  Discharge Date:  11/9/24    PCP Appointment Date: TBD - tasked to office  Specialist Appointment Date:  TBD - pt to schedule with pulmonology  Hospital Encounter and Summary Linked: Yes  See discharge assessment below for further details     Engagement  Call Start Time: 1129 (11/11/2024 11:35 AM)    Medications  Medications reviewed with patient/caregiver?: Yes (11/11/2024 11:35 AM)  Is the patient having any side effects they believe may be caused by any medication additions or changes?: No (11/11/2024 11:35 AM)  Does the patient have all medications ordered at discharge?: Yes (11/11/2024 11:35 AM)  Prescription Comments: START taking these medications    o ipratropium-albuteroL 0.5-2.5 mg/3 mL nebulizer solution; Commonly known   as: Duo-Neb; Take 3 mL by nebulization 4 times a day. (11/11/2024 11:35 AM)  Is the patient taking all medications as directed (includes completed medication regime)?: Yes (11/11/2024 11:35 AM)  Medication Comments: Patient has no questions or concerns regarding medications. She has a nurse visit scheduled with  this afternoon and is waiting for the nurse to come out to show her how to use the nebulizer. (11/11/2024 11:35 AM)    Appointments  Does the patient have a primary care provider?: Yes (11/11/2024 11:35 AM)  Care Management Interventions: Advised patient to make appointment (11/11/2024 11:35 AM)  Has the patient kept scheduled appointments due by today?: Not applicable (11/11/2024 11:35 AM)    Self Management  What is the home health agency?:  (11/11/2024 11:35 AM)  Has home health visited the patient within 72 hours of discharge?: Call prior to 72 hours (11/11/2024 11:35 AM)  What Durable Medical Equipment (DME) was ordered?: nebulizer (11/11/2024 11:35 AM)  Has all Durable Medical Equipment (DME) been delivered?: Yes (11/11/2024 11:35 AM)    Patient Teaching  Does the patient  have access to their discharge instructions?: Yes (11/11/2024 11:35 AM)  Care Management Interventions: Reviewed instructions with patient (11/11/2024 11:35 AM)  What is the patient's perception of their health status since discharge?: Same (11/11/2024 11:35 AM)  Is the patient/caregiver able to teach back the hierarchy of who to call/visit for symptoms/problems? PCP, Specialist, Home Health nurse, Urgent Care, ED, 911: Yes (11/11/2024 11:35 AM)  Patient/Caregiver Education Comments: Pt received nebulizer. She plans to have home health nurse demonstrate use during visit later today.  She does have a pulse oximeter at home and was encouraged to continue to spot check oxygen levels (as she was asymptomatic with recent drop in SPO2). She states that it does drop sometimes when she checks it, but after sitting for a few minutes it goes back up.  States it was not going back up at pulm appt. (11/11/2024 11:35 AM)    Wrap Up  Wrap Up Additional Comments: Successful transition of care outreach with patient. Patient reports doing well at home since discharge. New meds/changes reviewed with patient during outreach. Patient denies SOB at time of call.  Patient denies further discharge questions/concerns/needs at time of outreach call. Emphasized that follow up appts are needed after discharge with PCP and reviewed needed follow ups with any specialties to assess response to treatment from hospitalization. She will be calling to schedule pulmonology appt and PCP appts. Declines offer to help with scheduling PCP appt at this time, stating she has to arrange transportation and would rather call herself.  Patient aware of my availability for non-emergent concerns. (11/11/2024 11:35 AM)

## 2024-11-12 ENCOUNTER — HOME CARE VISIT (OUTPATIENT)
Dept: HOME HEALTH SERVICES | Facility: HOME HEALTH | Age: 74
End: 2024-11-12
Payer: MEDICARE

## 2024-11-12 ENCOUNTER — TELEPHONE (OUTPATIENT)
Dept: PRIMARY CARE | Facility: CLINIC | Age: 74
End: 2024-11-12
Payer: MEDICARE

## 2024-11-12 PROCEDURE — G0299 HHS/HOSPICE OF RN EA 15 MIN: HCPCS | Mod: HHH

## 2024-11-12 SDOH — ECONOMIC STABILITY: FOOD INSECURITY: MEALS PER DAY: 3

## 2024-11-12 SDOH — ECONOMIC STABILITY: HOUSING INSECURITY: HOME SAFETY: 3 LITERS OF OXYGEN

## 2024-11-12 ASSESSMENT — ENCOUNTER SYMPTOMS
SHORTNESS OF BREATH: 1
PAIN LOCATION - PAIN FREQUENCY: INTERMITTENT
APPETITE LEVEL: FAIR
HIGHEST PAIN SEVERITY IN PAST 24 HOURS: 0/10
PERSON REPORTING PAIN: PATIENT
PAIN: 1
PAIN SEVERITY GOAL: 0/10
SUBJECTIVE PAIN PROGRESSION: UNCHANGED
DYSPNEA ACTIVITY LEVEL: AFTER AMBULATING MORE THAN 20 FT
PAIN LOCATION - PAIN SEVERITY: 0/10
CHANGE IN APPETITE: UNCHANGED
LOWEST PAIN SEVERITY IN PAST 24 HOURS: 0/10
PAIN LOCATION - PAIN QUALITY: ACHY
PAIN LOCATION: GENERALIZED

## 2024-11-12 ASSESSMENT — ACTIVITIES OF DAILY LIVING (ADL): ENTERING_EXITING_HOME: STAND BY ASSIST

## 2024-11-12 ASSESSMENT — LIFESTYLE VARIABLES: SMOKING_STATUS: 0

## 2024-11-12 NOTE — PROGRESS NOTES
"TCM RN received voicemail message from yesterday late afternoon stating the home health nurse had not shown up yet.  Returned her call today and she reports there was a mix up but that the home health nurse came out today and she did learn how to use the nebulizer.  She is able to do this independently now and had no additional questions.  She continues to have concerns as to how long the oxygen will last and how/where she will be able to charge the oxygen machine while she's away from home. States \"I know I'll figure it out\".  Provided support and gave some suggestions.  She is wondering about getting help with transportation and encouraged her to call the senior center or city in her community as she does believe they have senior transportation.  She is in agreement with this. Encouraged her to call back with any additional questions.   "

## 2024-11-16 VITALS
RESPIRATION RATE: 20 BRPM | HEART RATE: 99 BPM | SYSTOLIC BLOOD PRESSURE: 110 MMHG | TEMPERATURE: 98.5 F | DIASTOLIC BLOOD PRESSURE: 60 MMHG | OXYGEN SATURATION: 97 %

## 2024-11-16 SDOH — ECONOMIC STABILITY: HOUSING INSECURITY: EVIDENCE OF SMOKING MATERIAL: 0

## 2024-11-16 SDOH — HEALTH STABILITY: MENTAL HEALTH: SMOKING IN HOME: 0

## 2024-11-16 ASSESSMENT — ENCOUNTER SYMPTOMS: MUSCLE WEAKNESS: 1

## 2024-11-16 ASSESSMENT — ACTIVITIES OF DAILY LIVING (ADL)
OASIS_M1830: 02
AMBULATION ASSISTANCE: STAND BY ASSIST
CURRENT_FUNCTION: STAND BY ASSIST

## 2024-11-18 ENCOUNTER — APPOINTMENT (OUTPATIENT)
Dept: CARDIOLOGY | Facility: HOSPITAL | Age: 74
End: 2024-11-18
Payer: MEDICARE

## 2024-11-18 DIAGNOSIS — R09.02 HYPOXIA: Primary | ICD-10-CM

## 2024-11-19 ENCOUNTER — HOME CARE VISIT (OUTPATIENT)
Dept: HOME HEALTH SERVICES | Facility: HOME HEALTH | Age: 74
End: 2024-11-19
Payer: MEDICARE

## 2024-11-19 VITALS
HEART RATE: 80 BPM | DIASTOLIC BLOOD PRESSURE: 80 MMHG | OXYGEN SATURATION: 97 % | SYSTOLIC BLOOD PRESSURE: 120 MMHG | TEMPERATURE: 98.1 F | RESPIRATION RATE: 18 BRPM

## 2024-11-19 PROCEDURE — G0299 HHS/HOSPICE OF RN EA 15 MIN: HCPCS | Mod: HHH

## 2024-11-19 SDOH — ECONOMIC STABILITY: HOUSING INSECURITY: EVIDENCE OF SMOKING MATERIAL: 0

## 2024-11-19 SDOH — ECONOMIC STABILITY: GENERAL

## 2024-11-19 SDOH — HEALTH STABILITY: MENTAL HEALTH: SMOKING IN HOME: 0

## 2024-11-19 ASSESSMENT — ENCOUNTER SYMPTOMS
FATIGUES EASILY: 1
DYSPNEA ON EXERTION: 1
BOWEL PATTERN NORMAL: 1
SHORTNESS OF BREATH: 1
PERSON REPORTING PAIN: PATIENT
STOOL FREQUENCY: DAILY
DYSPNEA ACTIVITY LEVEL: AFTER AMBULATING 10 - 20 FT
DENIES PAIN: 1
LAST BOWEL MOVEMENT: 67163
APPETITE LEVEL: FAIR

## 2024-11-19 ASSESSMENT — ACTIVITIES OF DAILY LIVING (ADL): MONEY MANAGEMENT (EXPENSES/BILLS): NEEDS ASSISTANCE

## 2024-11-20 ENCOUNTER — HOME CARE VISIT (OUTPATIENT)
Dept: HOME HEALTH SERVICES | Facility: HOME HEALTH | Age: 74
End: 2024-11-20
Payer: MEDICARE

## 2024-11-27 ENCOUNTER — HOME CARE VISIT (OUTPATIENT)
Dept: HOME HEALTH SERVICES | Facility: HOME HEALTH | Age: 74
End: 2024-11-27
Payer: MEDICARE

## 2024-11-27 VITALS
HEART RATE: 97 BPM | DIASTOLIC BLOOD PRESSURE: 66 MMHG | OXYGEN SATURATION: 95 % | TEMPERATURE: 99 F | RESPIRATION RATE: 18 BRPM | SYSTOLIC BLOOD PRESSURE: 110 MMHG

## 2024-11-27 PROCEDURE — G0299 HHS/HOSPICE OF RN EA 15 MIN: HCPCS | Mod: HHH

## 2024-11-27 SDOH — HEALTH STABILITY: MENTAL HEALTH: SMOKING IN HOME: 0

## 2024-11-27 SDOH — ECONOMIC STABILITY: HOUSING INSECURITY: EVIDENCE OF SMOKING MATERIAL: 0

## 2024-11-27 ASSESSMENT — ENCOUNTER SYMPTOMS
DENIES PAIN: 1
PERSON REPORTING PAIN: PATIENT

## 2024-11-27 ASSESSMENT — ACTIVITIES OF DAILY LIVING (ADL)
HOME_HEALTH_OASIS: 01
OASIS_M1830: 01

## 2024-12-02 ENCOUNTER — TELEPHONE (OUTPATIENT)
Dept: CARDIAC REHAB | Facility: CLINIC | Age: 74
End: 2024-12-02
Payer: MEDICARE

## 2024-12-02 DIAGNOSIS — J44.1 COPD EXACERBATION (MULTI): ICD-10-CM

## 2024-12-02 RX ORDER — IPRATROPIUM BROMIDE AND ALBUTEROL SULFATE 2.5; .5 MG/3ML; MG/3ML
3 SOLUTION RESPIRATORY (INHALATION)
Qty: 360 ML | Refills: 1 | Status: SHIPPED | OUTPATIENT
Start: 2024-12-02 | End: 2025-01-31

## 2024-12-06 ENCOUNTER — TELEPHONE (OUTPATIENT)
Dept: PRIMARY CARE | Facility: CLINIC | Age: 74
End: 2024-12-06
Payer: MEDICARE

## 2024-12-10 ENCOUNTER — TELEMEDICINE (OUTPATIENT)
Dept: PRIMARY CARE | Facility: CLINIC | Age: 74
End: 2024-12-10
Payer: MEDICARE

## 2024-12-10 ENCOUNTER — TELEPHONE (OUTPATIENT)
Dept: PRIMARY CARE | Facility: CLINIC | Age: 74
End: 2024-12-10

## 2024-12-10 DIAGNOSIS — J43.2 CENTRILOBULAR EMPHYSEMA (MULTI): ICD-10-CM

## 2024-12-10 DIAGNOSIS — J01.00 ACUTE NON-RECURRENT MAXILLARY SINUSITIS: Primary | ICD-10-CM

## 2024-12-10 DIAGNOSIS — J44.1 COPD EXACERBATION (MULTI): ICD-10-CM

## 2024-12-10 DIAGNOSIS — J96.12 CHRONIC RESPIRATORY FAILURE WITH HYPOXIA AND HYPERCAPNIA (MULTI): ICD-10-CM

## 2024-12-10 DIAGNOSIS — J96.11 CHRONIC RESPIRATORY FAILURE WITH HYPOXIA AND HYPERCAPNIA (MULTI): ICD-10-CM

## 2024-12-10 PROCEDURE — G2211 COMPLEX E/M VISIT ADD ON: HCPCS | Performed by: FAMILY MEDICINE

## 2024-12-10 PROCEDURE — 1123F ACP DISCUSS/DSCN MKR DOCD: CPT | Performed by: FAMILY MEDICINE

## 2024-12-10 PROCEDURE — 1159F MED LIST DOCD IN RCRD: CPT | Performed by: FAMILY MEDICINE

## 2024-12-10 PROCEDURE — 99214 OFFICE O/P EST MOD 30 MIN: CPT | Performed by: FAMILY MEDICINE

## 2024-12-10 RX ORDER — IPRATROPIUM BROMIDE AND ALBUTEROL SULFATE 2.5; .5 MG/3ML; MG/3ML
SOLUTION RESPIRATORY (INHALATION)
OUTPATIENT
Start: 2024-12-10

## 2024-12-10 RX ORDER — IPRATROPIUM BROMIDE AND ALBUTEROL SULFATE 2.5; .5 MG/3ML; MG/3ML
3 SOLUTION RESPIRATORY (INHALATION)
Qty: 360 ML | Refills: 1 | Status: SHIPPED | OUTPATIENT
Start: 2024-12-10 | End: 2025-02-08

## 2024-12-10 RX ORDER — AZITHROMYCIN 250 MG/1
TABLET, FILM COATED ORAL
Qty: 6 TABLET | Refills: 0 | Status: SHIPPED | OUTPATIENT
Start: 2024-12-10 | End: 2024-12-16 | Stop reason: ALTCHOICE

## 2024-12-10 RX ORDER — ALBUTEROL SULFATE 90 UG/1
2 INHALANT RESPIRATORY (INHALATION) EVERY 4 HOURS PRN
Qty: 6.7 G | Refills: 11 | Status: SHIPPED | OUTPATIENT
Start: 2024-12-10 | End: 2025-12-10

## 2024-12-10 RX ORDER — AZELASTINE 1 MG/ML
1 SPRAY, METERED NASAL 2 TIMES DAILY
Qty: 30 ML | Refills: 12 | Status: SHIPPED | OUTPATIENT
Start: 2024-12-10 | End: 2025-12-10

## 2024-12-10 ASSESSMENT — ENCOUNTER SYMPTOMS
LOSS OF SENSATION IN FEET: 0
DEPRESSION: 0
OCCASIONAL FEELINGS OF UNSTEADINESS: 0

## 2024-12-12 ENCOUNTER — TELEPHONE (OUTPATIENT)
Dept: CARDIAC REHAB | Facility: CLINIC | Age: 74
End: 2024-12-12
Payer: MEDICARE

## 2024-12-15 PROBLEM — J96.11 CHRONIC RESPIRATORY FAILURE WITH HYPOXIA AND HYPERCAPNIA (MULTI): Status: ACTIVE | Noted: 2024-12-15

## 2024-12-15 PROBLEM — J96.12 CHRONIC RESPIRATORY FAILURE WITH HYPOXIA AND HYPERCAPNIA (MULTI): Status: ACTIVE | Noted: 2024-12-15

## 2024-12-15 PROBLEM — J01.00 ACUTE NON-RECURRENT MAXILLARY SINUSITIS: Status: ACTIVE | Noted: 2024-12-15

## 2024-12-15 ASSESSMENT — ENCOUNTER SYMPTOMS
CHILLS: 1
SORE THROAT: 1
SINUS PRESSURE: 1
SHORTNESS OF BREATH: 0
SWOLLEN GLANDS: 1
COUGH: 1
HOARSE VOICE: 1
HEADACHES: 1

## 2024-12-15 NOTE — PROGRESS NOTES
Subjective   Patient ID: Humera Villegas is a 74 y.o. female who presents for Sinusitis and Virtual Visit.  An interactive telecommunication system which permits real time communications between the patient (at the originating site) and provider (at the distant site) was utilized to provide this telehealth service.    Verbal consent was requested and obtained from the patient for this telehealth visit.    We have confirmed the patient wishes to see me, Dr. Fabio Chou , a board certified Family Medicine physician with an active [place] medical license as well as verified the patient's identity and physical location in [place].  Audio and Visual both.      Sinusitis  This is a recurrent problem. The current episode started in the past 7 days. The problem has been gradually worsening since onset. There has been no fever. Her pain is at a severity of 6/10. Associated symptoms include chills, congestion, coughing, headaches, a hoarse voice, sinus pressure, a sore throat and swollen glands. Pertinent negatives include no shortness of breath. Past treatments include nothing. The treatment provided mild relief.   Has also been SOB  On O2 4 liters   Current Outpatient Medications on File Prior to Visit   Medication Sig Dispense Refill    amLODIPine (Norvasc) 2.5 mg tablet TAKE 1 TABLET DAILY 90 tablet 3    atorvastatin (Lipitor) 20 mg tablet TAKE 1 TABLET DAILY 90 tablet 3    cholecalciferol (Vitamin D3) 25 MCG (1000 UT) tablet Take 1 tablet (25 mcg) by mouth once daily.      cyanocobalamin (Vitamin B-12) 1,000 mcg tablet Take 1 tablet (1,000 mcg) by mouth once daily. 90 tablet 3    denosumab (Prolia) 60 mg/mL syringe Inject 1 mL (60 mg) under the skin every 6 months.      fluticasone (Flonase) 50 mcg/actuation nasal spray Administer 2 sprays into each nostril once daily. Shake gently. Before first use, prime pump. After use, clean tip and replace cap. 16 g 2    fluticasone-umeclidin-vilanter (Trelegy Ellipta) 100-62.5-25 mcg  "blister with device Inhale 1 puff once daily. 90 each 1    nebulizers (Compact Compressor Nebulizer) misc Use with Duoneb every 6 hours routine for copd 1 each 0    nebulizers misc Use every 6 hours with Duoneb nebulizer medication 1 each 0    omeprazole (PriLOSEC) 20 mg DR capsule TAKE 1 CAPSULE DAILY 90 capsule 3    oxygen (O2) gas therapy Inhale 3 L/min continuously. Indications: COPD      silicone,dressing-foam bandage 9 X 9 \" bandage Apply 1 patch topically once daily. 30 each 1    [DISCONTINUED] albuterol (Ventolin HFA) 90 mcg/actuation inhaler Inhale 2 puffs every 4 hours if needed for wheezing or shortness of breath. 8 g 11    [DISCONTINUED] ipratropium-albuteroL (Duo-Neb) 0.5-2.5 mg/3 mL nebulizer solution Take 3 mL by nebulization 4 times a day. 360 mL 1     No current facility-administered medications on file prior to visit.        Review of Systems   Constitutional:  Positive for chills.   HENT:  Positive for congestion, hoarse voice, sinus pressure and sore throat.    Respiratory:  Positive for cough. Negative for shortness of breath.    Neurological:  Positive for headaches.       Objective   There were no vitals taken for this visit.  BSA: There is no height or weight on file to calculate BSA.  Growth percentiles: Facility age limit for growth %melodie is 20 years. Facility age limit for growth %melodie is 20 years.   No visits with results within 1 Week(s) from this visit.   Latest known visit with results is:   Admission on 11/05/2024, Discharged on 11/08/2024   Component Date Value Ref Range Status    POCT pH, Venous 11/05/2024 7.40  7.33 - 7.43 pH Final    POCT pCO2, Venous 11/05/2024 45  41 - 51 mm Hg Final    POCT pO2, Venous 11/05/2024 38  35 - 45 mm Hg Final    POCT SO2, Venous 11/05/2024 56  45 - 75 % Final    POCT Oxy Hemoglobin, Venous 11/05/2024 55.2  45.0 - 75.0 % Final    POCT Hematocrit Calculated, Venous 11/05/2024 47.0 (H)  36.0 - 46.0 % Final    POCT Sodium, Venous 11/05/2024 132 (L)  136 " - 145 mmol/L Final    POCT Potassium, Venous 11/05/2024 4.4  3.5 - 5.3 mmol/L Final    POCT Chloride, Venous 11/05/2024 99  98 - 107 mmol/L Final    POCT Ionized Calicum, Venous 11/05/2024 1.21  1.10 - 1.33 mmol/L Final    POCT Glucose, Venous 11/05/2024 146 (H)  74 - 99 mg/dL Final    POCT Lactate, Venous 11/05/2024 1.7  0.4 - 2.0 mmol/L Final    POCT Base Excess, Venous 11/05/2024 2.4  -2.0 - 3.0 mmol/L Final    POCT HCO3 Calculated, Venous 11/05/2024 27.9 (H)  22.0 - 26.0 mmol/L Final    POCT Hemoglobin, Venous 11/05/2024 15.8  12.0 - 16.0 g/dL Final    POCT Anion Gap, Venous 11/05/2024 10.0  10.0 - 25.0 mmol/L Final    Patient Temperature 11/05/2024 37.0  degrees Celsius Final    FiO2 11/05/2024 100  % Final    13L    WBC 11/05/2024 5.5  4.4 - 11.3 x10*3/uL Final    nRBC 11/05/2024 0.0  0.0 - 0.0 /100 WBCs Final    RBC 11/05/2024 4.74  4.00 - 5.20 x10*6/uL Final    Hemoglobin 11/05/2024 14.6  12.0 - 16.0 g/dL Final    Hematocrit 11/05/2024 44.6  36.0 - 46.0 % Final    MCV 11/05/2024 94  80 - 100 fL Final    MCH 11/05/2024 30.8  26.0 - 34.0 pg Final    MCHC 11/05/2024 32.7  32.0 - 36.0 g/dL Final    RDW 11/05/2024 17.2 (H)  11.5 - 14.5 % Final    Platelets 11/05/2024 179  150 - 450 x10*3/uL Final    Neutrophils % 11/05/2024 71.5  40.0 - 80.0 % Final    Immature Granulocytes %, Automated 11/05/2024 0.4  0.0 - 0.9 % Final    Immature Granulocyte Count (IG) includes promyelocytes, myelocytes and metamyelocytes but does not include bands. Percent differential counts (%) should be interpreted in the context of the absolute cell counts (cells/UL).    Lymphocytes % 11/05/2024 18.9  13.0 - 44.0 % Final    Monocytes % 11/05/2024 8.5  2.0 - 10.0 % Final    Eosinophils % 11/05/2024 0.5  0.0 - 6.0 % Final    Basophils % 11/05/2024 0.2  0.0 - 2.0 % Final    Neutrophils Absolute 11/05/2024 3.93  1.60 - 5.50 x10*3/uL Final    Percent differential counts (%) should be interpreted in the context of the absolute cell counts  (cells/uL).    Immature Granulocytes Absolute, Au* 11/05/2024 0.02  0.00 - 0.50 x10*3/uL Final    Lymphocytes Absolute 11/05/2024 1.04  0.80 - 3.00 x10*3/uL Final    Monocytes Absolute 11/05/2024 0.47  0.05 - 0.80 x10*3/uL Final    Eosinophils Absolute 11/05/2024 0.03  0.00 - 0.40 x10*3/uL Final    Basophils Absolute 11/05/2024 0.01  0.00 - 0.10 x10*3/uL Final    Glucose 11/05/2024 136 (H)  74 - 99 mg/dL Final    Sodium 11/05/2024 136  136 - 145 mmol/L Final    Potassium 11/05/2024 4.5  3.5 - 5.3 mmol/L Final    Chloride 11/05/2024 100  98 - 107 mmol/L Final    Bicarbonate 11/05/2024 29  21 - 32 mmol/L Final    Anion Gap 11/05/2024 12  10 - 20 mmol/L Final    Urea Nitrogen 11/05/2024 16  6 - 23 mg/dL Final    Creatinine 11/05/2024 0.71  0.50 - 1.05 mg/dL Final    eGFR 11/05/2024 88  >60 mL/min/1.73m*2 Final    Calculations of estimated GFR are performed using the 2021 CKD-EPI Study Refit equation without the race variable for the IDMS-Traceable creatinine methods.  https://jasn.asnjournals.org/content/early/2021/09/22/ASN.5229345384    Calcium 11/05/2024 9.6  8.6 - 10.3 mg/dL Final    Troponin I, High Sensitivity 11/05/2024 7  0 - 13 ng/L Final    BNP 11/05/2024 140 (H)  0 - 99 pg/mL Final    Glucose 11/05/2024 179 (H)  74 - 99 mg/dL Final    Sodium 11/05/2024 137  136 - 145 mmol/L Final    Potassium 11/05/2024 3.7  3.5 - 5.3 mmol/L Final    Chloride 11/05/2024 103  98 - 107 mmol/L Final    Bicarbonate 11/05/2024 25  21 - 32 mmol/L Final    Anion Gap 11/05/2024 13  10 - 20 mmol/L Final    Urea Nitrogen 11/05/2024 15  6 - 23 mg/dL Final    Creatinine 11/05/2024 0.67  0.50 - 1.05 mg/dL Final    eGFR 11/05/2024 >90  >60 mL/min/1.73m*2 Final    Calculations of estimated GFR are performed using the 2021 CKD-EPI Study Refit equation without the race variable for the IDMS-Traceable creatinine methods.  https://jasn.asnjournals.org/content/early/2021/09/22/ASN.3287277078    Calcium 11/05/2024 9.1  8.6 - 10.3 mg/dL Final     Glucose 11/06/2024 117 (H)  74 - 99 mg/dL Final    Sodium 11/06/2024 136  136 - 145 mmol/L Final    Potassium 11/06/2024 4.5  3.5 - 5.3 mmol/L Final    Chloride 11/06/2024 101  98 - 107 mmol/L Final    Bicarbonate 11/06/2024 26  21 - 32 mmol/L Final    Anion Gap 11/06/2024 14  10 - 20 mmol/L Final    Urea Nitrogen 11/06/2024 16  6 - 23 mg/dL Final    Creatinine 11/06/2024 0.65  0.50 - 1.05 mg/dL Final    eGFR 11/06/2024 >90  >60 mL/min/1.73m*2 Final    Calculations of estimated GFR are performed using the 2021 CKD-EPI Study Refit equation without the race variable for the IDMS-Traceable creatinine methods.  https://jasn.asnjournals.org/content/early/2021/09/22/ASN.9882405947    Calcium 11/06/2024 9.4  8.6 - 10.3 mg/dL Final    WBC 11/05/2024 5.2  4.4 - 11.3 x10*3/uL Final    nRBC 11/05/2024 0.0  0.0 - 0.0 /100 WBCs Final    RBC 11/05/2024 4.67  4.00 - 5.20 x10*6/uL Final    Hemoglobin 11/05/2024 14.3  12.0 - 16.0 g/dL Final    Hematocrit 11/05/2024 43.6  36.0 - 46.0 % Final    MCV 11/05/2024 93  80 - 100 fL Final    MCH 11/05/2024 30.6  26.0 - 34.0 pg Final    MCHC 11/05/2024 32.8  32.0 - 36.0 g/dL Final    RDW 11/05/2024 17.3 (H)  11.5 - 14.5 % Final    Platelets 11/05/2024 167  150 - 450 x10*3/uL Final    WBC 11/06/2024 5.0  4.4 - 11.3 x10*3/uL Final    nRBC 11/06/2024 0.0  0.0 - 0.0 /100 WBCs Final    RBC 11/06/2024 4.68  4.00 - 5.20 x10*6/uL Final    Hemoglobin 11/06/2024 14.4  12.0 - 16.0 g/dL Final    Hematocrit 11/06/2024 44.9  36.0 - 46.0 % Final    MCV 11/06/2024 96  80 - 100 fL Final    MCH 11/06/2024 30.8  26.0 - 34.0 pg Final    MCHC 11/06/2024 32.1  32.0 - 36.0 g/dL Final    RDW 11/06/2024 17.3 (H)  11.5 - 14.5 % Final    Platelets 11/06/2024 171  150 - 450 x10*3/uL Final    Color, Urine 11/06/2024 Light-Yellow  Light-Yellow, Yellow, Dark-Yellow Final    Appearance, Urine 11/06/2024 Clear  Clear Final    Specific Gravity, Urine 11/06/2024 1.025  1.005 - 1.035 Final    pH, Urine 11/06/2024 6.5  5.0, 5.5,  6.0, 6.5, 7.0, 7.5, 8.0 Final    Protein, Urine 11/06/2024 NEGATIVE  NEGATIVE, 10 (TRACE), 20 (TRACE) mg/dL Final    Glucose, Urine 11/06/2024 Normal  Normal mg/dL Final    Blood, Urine 11/06/2024 NEGATIVE  NEGATIVE Final    Ketones, Urine 11/06/2024 NEGATIVE  NEGATIVE mg/dL Final    Bilirubin, Urine 11/06/2024 NEGATIVE  NEGATIVE Final    Urobilinogen, Urine 11/06/2024 Normal  Normal mg/dL Final    Nitrite, Urine 11/06/2024 NEGATIVE  NEGATIVE Final    Leukocyte Esterase, Urine 11/06/2024 NEGATIVE  NEGATIVE Final    Glucose 11/07/2024 154 (H)  74 - 99 mg/dL Final    Sodium 11/07/2024 137  136 - 145 mmol/L Final    Potassium 11/07/2024 4.5  3.5 - 5.3 mmol/L Final    Chloride 11/07/2024 103  98 - 107 mmol/L Final    Bicarbonate 11/07/2024 27  21 - 32 mmol/L Final    Anion Gap 11/07/2024 12  10 - 20 mmol/L Final    Urea Nitrogen 11/07/2024 20  6 - 23 mg/dL Final    Creatinine 11/07/2024 0.62  0.50 - 1.05 mg/dL Final    eGFR 11/07/2024 >90  >60 mL/min/1.73m*2 Final    Calculations of estimated GFR are performed using the 2021 CKD-EPI Study Refit equation without the race variable for the IDMS-Traceable creatinine methods.  https://jasn.asnjournals.org/content/early/2021/09/22/ASN.3908886897    Calcium 11/07/2024 9.3  8.6 - 10.3 mg/dL Final    WBC 11/07/2024 11.3  4.4 - 11.3 x10*3/uL Final    nRBC 11/07/2024 0.0  0.0 - 0.0 /100 WBCs Final    RBC 11/07/2024 4.31  4.00 - 5.20 x10*6/uL Final    Hemoglobin 11/07/2024 13.4  12.0 - 16.0 g/dL Final    Hematocrit 11/07/2024 42.1  36.0 - 46.0 % Final    MCV 11/07/2024 98  80 - 100 fL Final    MCH 11/07/2024 31.1  26.0 - 34.0 pg Final    MCHC 11/07/2024 31.8 (L)  32.0 - 36.0 g/dL Final    RDW 11/07/2024 17.7 (H)  11.5 - 14.5 % Final    Platelets 11/07/2024 159  150 - 450 x10*3/uL Final    Troponin I, High Sensitivity 11/07/2024 11  0 - 13 ng/L Final    Glucose 11/08/2024 67 (L)  74 - 99 mg/dL Final    Sodium 11/08/2024 137  136 - 145 mmol/L Final    Potassium 11/08/2024 3.8  3.5 -  5.3 mmol/L Final    Chloride 11/08/2024 103  98 - 107 mmol/L Final    Bicarbonate 11/08/2024 28  21 - 32 mmol/L Final    Anion Gap 11/08/2024 10  10 - 20 mmol/L Final    Urea Nitrogen 11/08/2024 24 (H)  6 - 23 mg/dL Final    Creatinine 11/08/2024 0.59  0.50 - 1.05 mg/dL Final    eGFR 11/08/2024 >90  >60 mL/min/1.73m*2 Final    Calculations of estimated GFR are performed using the 2021 CKD-EPI Study Refit equation without the race variable for the IDMS-Traceable creatinine methods.  https://jasn.asnjournals.org/content/early/2021/09/22/ASN.0572449960    Calcium 11/08/2024 9.0  8.6 - 10.3 mg/dL Final    WBC 11/08/2024 8.6  4.4 - 11.3 x10*3/uL Final    nRBC 11/08/2024 0.0  0.0 - 0.0 /100 WBCs Final    RBC 11/08/2024 4.46  4.00 - 5.20 x10*6/uL Final    Hemoglobin 11/08/2024 13.3  12.0 - 16.0 g/dL Final    Hematocrit 11/08/2024 43.1  36.0 - 46.0 % Final    MCV 11/08/2024 97  80 - 100 fL Final    MCH 11/08/2024 29.8  26.0 - 34.0 pg Final    MCHC 11/08/2024 30.9 (L)  32.0 - 36.0 g/dL Final    RDW 11/08/2024 18.0 (H)  11.5 - 14.5 % Final    Platelets 11/08/2024 179  150 - 450 x10*3/uL Final    Magnesium 11/08/2024 2.07  1.60 - 2.40 mg/dL Final      Physical Exam  Constitutional:       General: She is not in acute distress.     Appearance: She is ill-appearing. She is not toxic-appearing.   Neurological:      Mental Status: She is alert.         Assessment/Plan   Problem List Items Addressed This Visit       COPD (chronic obstructive pulmonary disease) (Multi)    Relevant Medications    albuterol (Ventolin HFA) 90 mcg/actuation inhaler    Acute non-recurrent maxillary sinusitis - Primary    Relevant Medications    azelastine (Astelin) 137 mcg (0.1 %) nasal spray    azithromycin (Zithromax) 250 mg tablet    Chronic respiratory failure with hypoxia and hypercapnia (Multi)     Will c/w O2

## 2024-12-16 ENCOUNTER — OFFICE VISIT (OUTPATIENT)
Dept: CARDIOLOGY | Facility: HOSPITAL | Age: 74
End: 2024-12-16
Payer: MEDICARE

## 2024-12-16 VITALS
WEIGHT: 109 LBS | HEIGHT: 62 IN | DIASTOLIC BLOOD PRESSURE: 70 MMHG | OXYGEN SATURATION: 92 % | BODY MASS INDEX: 20.06 KG/M2 | SYSTOLIC BLOOD PRESSURE: 119 MMHG | HEART RATE: 110 BPM

## 2024-12-16 DIAGNOSIS — I35.0 NONRHEUMATIC AORTIC VALVE STENOSIS: Primary | ICD-10-CM

## 2024-12-16 PROCEDURE — 1159F MED LIST DOCD IN RCRD: CPT | Performed by: INTERNAL MEDICINE

## 2024-12-16 PROCEDURE — 3078F DIAST BP <80 MM HG: CPT | Performed by: INTERNAL MEDICINE

## 2024-12-16 PROCEDURE — 99214 OFFICE O/P EST MOD 30 MIN: CPT | Performed by: INTERNAL MEDICINE

## 2024-12-16 PROCEDURE — 1036F TOBACCO NON-USER: CPT | Performed by: INTERNAL MEDICINE

## 2024-12-16 PROCEDURE — 1123F ACP DISCUSS/DSCN MKR DOCD: CPT | Performed by: INTERNAL MEDICINE

## 2024-12-16 PROCEDURE — G2211 COMPLEX E/M VISIT ADD ON: HCPCS | Performed by: INTERNAL MEDICINE

## 2024-12-16 PROCEDURE — 3074F SYST BP LT 130 MM HG: CPT | Performed by: INTERNAL MEDICINE

## 2024-12-16 ASSESSMENT — ENCOUNTER SYMPTOMS
LOSS OF SENSATION IN FEET: 1
DEPRESSION: 0
OCCASIONAL FEELINGS OF UNSTEADINESS: 1

## 2024-12-16 NOTE — PROGRESS NOTES
"Chief Complaint:   Hypertension     History Of Present Illness:    Humera Villegas is a 74 y.o. female here for follow-up regarding aortic stenosis. She has a history of moderate-severe aortic stenosis, hypertension, and COPD/severe emphysema with chronic hypoxic respiratory failure and group III pulmonary hypertension.    She reports feeling well and offers no specific complaints. Reports stable shortness of breath.          Last Recorded Vitals:  Vitals:    12/16/24 1551   BP: 119/70   BP Location: Left arm   Patient Position: Sitting   BP Cuff Size: Adult   Pulse: 110   SpO2: 92%   Weight: 49.4 kg (109 lb)   Height: 1.575 m (5' 2\")             Allergies:  Amoxicillin and Penicillin    Outpatient Medications:  Current Outpatient Medications   Medication Instructions    albuterol (Ventolin HFA) 90 mcg/actuation inhaler 2 puffs, inhalation, Every 4 hours PRN    amLODIPine (NORVASC) 2.5 mg, oral, Daily    atorvastatin (LIPITOR) 20 mg, oral, Daily    azelastine (Astelin) 137 mcg (0.1 %) nasal spray 1 spray, Each Nostril, 2 times daily, Use in each nostril as directed    cholecalciferol (Vitamin D3) 25 MCG (1000 UT) tablet 1 tablet, Daily    cyanocobalamin (VITAMIN B-12) 1,000 mcg, oral, Daily    denosumab (Prolia) 60 mg/mL syringe 1 mL, Every 6 months    fluticasone (Flonase) 50 mcg/actuation nasal spray 2 sprays, Each Nostril, Daily, Shake gently. Before first use, prime pump. After use, clean tip and replace cap.    fluticasone-umeclidin-vilanter (Trelegy Ellipta) 100-62.5-25 mcg blister with device 1 puff, inhalation, Daily    ipratropium-albuteroL (Duo-Neb) 0.5-2.5 mg/3 mL nebulizer solution 3 mL, nebulization, 4 times daily RT    nebulizers (Compact Compressor Nebulizer) misc Use with Duoneb every 6 hours routine for copd    nebulizers misc Use every 6 hours with Duoneb nebulizer medication    omeprazole (PRILOSEC) 20 mg, oral, Daily    oxygen (O2) 3 L/min, Continuous    silicone,dressing-foam bandage 9 X 9 \" " bandage 1 patch, topical (top), Daily         Physical Exam:  Gen Well appearing septuagenarian female sitting up in NAD. Body mass index is 19.94 kg/m².   CV rrr. 2/6 LAVELL. No JVD or leg edema.  Pulm Reduced throughout. Lungs are otherwise clear with normal respiratory effort.  Neuro Alert and conversant. Grossly nonfocal.            I reviewed most recent imaging / labs / and office notes as well as details of any recent hospitalizations or ED visits.    Assessment/Plan   1.  Aortic stenosis  Moderate-severe but mostly moderate by recent echo and exam. For repeat TTE next April.      2. Hypertension   BP well controlled. Her present regimen is to continue.         Follow-up April with same day echo        Micheal Arroyo MD

## 2024-12-20 ENCOUNTER — APPOINTMENT (OUTPATIENT)
Dept: PRIMARY CARE | Facility: CLINIC | Age: 74
End: 2024-12-20
Payer: MEDICARE

## 2024-12-20 ENCOUNTER — OFFICE VISIT (OUTPATIENT)
Dept: PULMONOLOGY | Facility: CLINIC | Age: 74
End: 2024-12-20
Payer: MEDICARE

## 2024-12-20 VITALS
RESPIRATION RATE: 20 BRPM | DIASTOLIC BLOOD PRESSURE: 72 MMHG | SYSTOLIC BLOOD PRESSURE: 114 MMHG | OXYGEN SATURATION: 93 % | HEIGHT: 62 IN | BODY MASS INDEX: 20.06 KG/M2 | HEART RATE: 110 BPM | TEMPERATURE: 97.6 F | WEIGHT: 109 LBS

## 2024-12-20 DIAGNOSIS — J96.11 CHRONIC HYPOXIC RESPIRATORY FAILURE (MULTI): ICD-10-CM

## 2024-12-20 DIAGNOSIS — I27.20 PULMONARY HTN (MULTI): ICD-10-CM

## 2024-12-20 DIAGNOSIS — J44.9 CHRONIC OBSTRUCTIVE PULMONARY DISEASE, UNSPECIFIED COPD TYPE (MULTI): Primary | ICD-10-CM

## 2024-12-20 DIAGNOSIS — R91.1 LUNG NODULE: ICD-10-CM

## 2024-12-20 PROCEDURE — 1160F RVW MEDS BY RX/DR IN RCRD: CPT | Performed by: INTERNAL MEDICINE

## 2024-12-20 PROCEDURE — 1036F TOBACCO NON-USER: CPT | Performed by: INTERNAL MEDICINE

## 2024-12-20 PROCEDURE — 3078F DIAST BP <80 MM HG: CPT | Performed by: INTERNAL MEDICINE

## 2024-12-20 PROCEDURE — 99215 OFFICE O/P EST HI 40 MIN: CPT | Performed by: INTERNAL MEDICINE

## 2024-12-20 PROCEDURE — 1159F MED LIST DOCD IN RCRD: CPT | Performed by: INTERNAL MEDICINE

## 2024-12-20 PROCEDURE — 3074F SYST BP LT 130 MM HG: CPT | Performed by: INTERNAL MEDICINE

## 2024-12-20 PROCEDURE — 1123F ACP DISCUSS/DSCN MKR DOCD: CPT | Performed by: INTERNAL MEDICINE

## 2024-12-20 ASSESSMENT — COPD QUESTIONNAIRES
QUESTION7_SLEEPQUALITY: 0 - I SLEEP SOUNDLY
QUESTION4_WALKINCLINE: 5 - WHEN I WALK UP A HILL OR ONE FLIGHT OF STAIRS I AM VERY BREATHLESS
CAT_TOTALSCORE: 17
QUESTION5_HOMEACTIVITIES: 5 - I AM VERY LIMITED DOING ACTIVITIES AT HOME
QUESTION8_ENERGYLEVEL: 4
QUESTION3_CHESTTIGHTNESS: 0 - MY CHEST DOES NOT FEEL TIGHT AT ALL
QUESTION6_LEAVINGHOUSE: 0 - I AM CONFIDENT LEAVING MY HOME DESPITE MY LUNG CONDITION
QUESTION1_COUGHFREQUENCY: 1
QUESTION2_CHESTPHLEGM: 2

## 2024-12-20 ASSESSMENT — ENCOUNTER SYMPTOMS
UNEXPECTED WEIGHT CHANGE: 0
SHORTNESS OF BREATH: 1
FATIGUE: 1
COUGH: 0
WHEEZING: 0
FEVER: 0

## 2024-12-20 NOTE — LETTER
To whom it may concern:    Mrs. Humera Villegas was seen in the office on 12/20/2024 for follow up of her chronic obstructive pulmonary disease. She has severely limited exercise capacity and wears oxygen chronically. She is unable to walk the length of her driveway to reach her mailbox, and her oxygen tubing cannot extend past 30 feet. Please kindly deliver her mail to her front door.       Thanks,        Pat Dobbs,

## 2024-12-20 NOTE — PROGRESS NOTES
"    Department of Medicine  Division of Pulmonary, Critical Care, and Sleep Medicine  Follow Up  [facility], [address]    Humera Villegas is a 74 y.o. female who returns as a follow up for hospital follow up. Last visit was on 11/8/2024.    Physician HPI (12/20/2024):  Admitted to [facility] from 11/5/2024-11/8/2024 for AECOPD and acute on chronic hypoxic respiratory failure. She also has a history of severe aortic stenosis, pulmonary HTN, LLL 8mm nodule. Her most recent FEV1 was 40% in 2020.    She has both good days and bad days in terms of her breathing.  She has been using both Trelegy Ellipta and DuoNebs every 6 hours at home.  She is minimally ambulatory and gets quite winded with minimal exertion.  She brought a form from the post office requesting that her mail be delivered directly to her doorstep so she would not have to walk the 75 feet of the length of her driveway.  She also states that she not receive a phone call from the [place] research study team.    Per previous notes:  11/6/2024:  74 y.o. female admitted on 11/5/2024  4:07 PM for hypoxia. She was seen by Dr. Smith in the office yesterday as a routine follow up and was noted to be hypoxic to 65%. She was placed on 10L/min O2 and sent to the ER. She was admitted in September for similar presentation of hypoxia. After her last admission she was discharged on a prednisone taper and states that once her prednisone finished, she started becoming more dyspneic with exertion. Minimal activity causes her to be dyspneic. She denies wheezing. She has an occasional morning productive cough and complains of runny nose. She is able to walk up 12 steps at home, but has to sit and rest for a few minutes to \"recover my oxygen.\"     She has a history of severe COPD (FEV1 40% in 2020), chronic hypoxic respiratory failure on 3L/min, severe aortic stenosis and pulmonary HTN. She is a former smoker, and is maintained on Trelegy Ellipta. She has a nebulizer at " home, but does not use it.     Labs on admission notable for , troponin 7, WBC 5.5. CTA was done ruling out PE but was notable for severe bilateral emphysema, and previously noted LLL nodule was still present.    I have personally reviewed PMH, PSH, Family History in the HISTORY tab of this chart, and unless noted above are not pertinent to the presenting complaint.  I have personally reviewed Social History as provided in the electronic medical record.    Immunization History:  Immunization History   Administered Date(s) Administered    Flu vaccine, quadrivalent, high-dose, preservative free, age 65y+ (FLUZONE) 09/11/2020, 10/04/2022    Flu vaccine, trivalent, preservative free, HIGH-DOSE, age 65y+ (Fluzone) 09/29/2016, 09/26/2017, 09/17/2018    Influenza, Seasonal, Quadrivalent, Adjuvanted 10/05/2021, 09/08/2023    Influenza, trivalent, adjuvanted 09/16/2019    Moderna SARS-CoV-2 Vaccination 02/26/2021, 03/26/2021, 10/26/2021    Pneumococcal conjugate vaccine, 13-valent (PREVNAR 13) 09/09/2018    Pneumococcal conjugate vaccine, 20-valent (PREVNAR 20) 09/08/2023    Pneumococcal polysaccharide vaccine, 23-valent, age 2 years and older (PNEUMOVAX 23) 11/03/2016    Zoster vaccine, recombinant, adult (SHINGRIX) 11/05/2018, 03/12/2019       Current Medications:  Current Outpatient Medications   Medication Instructions    albuterol (Ventolin HFA) 90 mcg/actuation inhaler 2 puffs, inhalation, Every 4 hours PRN    amLODIPine (NORVASC) 2.5 mg, oral, Daily    atorvastatin (LIPITOR) 20 mg, oral, Daily    azelastine (Astelin) 137 mcg (0.1 %) nasal spray 1 spray, Each Nostril, 2 times daily, Use in each nostril as directed    cholecalciferol (Vitamin D3) 25 MCG (1000 UT) tablet 1 tablet, Daily    cyanocobalamin (VITAMIN B-12) 1,000 mcg, oral, Daily    denosumab (Prolia) 60 mg/mL syringe 1 mL, Every 6 months    fluticasone (Flonase) 50 mcg/actuation nasal spray 2 sprays, Each Nostril, Daily, Shake gently. Before first  "use, prime pump. After use, clean tip and replace cap.    fluticasone-umeclidin-vilanter (Trelegy Ellipta) 100-62.5-25 mcg blister with device 1 puff, inhalation, Daily    ipratropium-albuteroL (Duo-Neb) 0.5-2.5 mg/3 mL nebulizer solution 3 mL, nebulization, 4 times daily RT    nebulizers (Compact Compressor Nebulizer) misc Use with Duoneb every 6 hours routine for copd    nebulizers misc Use every 6 hours with Duoneb nebulizer medication    omeprazole (PRILOSEC) 20 mg, oral, Daily    oxygen (O2) 3 L/min, Continuous    silicone,dressing-foam bandage 9 X 9 \" bandage 1 patch, topical (top), Daily        Drug Allergies/Intolerances:  Allergies   Allergen Reactions    Amoxicillin Unknown    Penicillin Other        Review of Systems:  Review of Systems   Constitutional:  Positive for fatigue. Negative for fever and unexpected weight change.   Respiratory:  Positive for shortness of breath. Negative for cough and wheezing.         All other review of systems are negative and/or non-contributory.    Physical Examination:  Vitals:    24 1042   BP: 114/72   BP Location: Right arm   Patient Position: Sitting   Pulse: 110   Resp: 20   Temp: 36.4 °C (97.6 °F)   TempSrc: Temporal   SpO2: 90%   Weight: 49.4 kg (109 lb)   Height: 1.575 m (5' 2\")          GEN: breathing comfortably  ENT: wearing O2 at 4L/min  CV: RRR, systolic murmur  LUNGS: good effort, quiet breath sounds  EXT: no edema, cyanosis, clubbing      Exacerbation History     2024 (admitted)  2024    Pulmonary Function Test Results     2020:  FVC: 2.16 L (81%)  FEV1: 0.84 L (40%)  FEV1/FVC: 39  T.74 L (98%)  RV: 155%  DLCO: 28%    O2 Requirements     6MWT:    Sleep Study       CAT score     COPD Assessment Test (CAT)      I never cough 1 I cough all the time   I have no phlegm (mucus) in my chest at all 2 My chest is completely full of phlegm (mucus)   My chest does not feel tight at all 0 My chest feels very tight   When I walk up a " hill or one flight of stairs I am not breathless 5 When I walk up a hill or one flight of stairs I am very breathless   I am not limited doing any activities at home 5 I am very limited doing activities at home   I am confident leaving my home despite my lung condition 0 I am not at all confident leaving my home because of my lung condition   I sleep soundly 0 I don't sleep soundly because of my lung condition   I have lots of energy 4 I have no energy at all     Total: 17    >30 Very High  >20 High  10-20 Medium  <10 Low  5 upper limit of normal in healthy adults    Reference: Bhargav PW, Zeynep G, Chava P, Cy I, Agueda WH, Star Cruz N. Development and first validation of the COPD Assessment Test. Eur Respir J. 2009;34(3):648-54.    mMRC (Modified Medical Research Hoh) Dyspnea Scale  Too dyspneic to leave house or breathless when dressing: +4    Reference: Pa DA, Wells CK. Evaluation of clinical methods for rating dyspnea. CHEST. 1988;93(3):580-6.      Peak Flow and ACT     N/A    Chest Radiograph     11/5/2024:  Extensive COPD changes.       Slight worsening patchy basilar opacities suggest possible edema or   pneumonia.     Chest CT Scan     CTA 11/5/2024:  The trachea and central airways are patent. No endobronchial lesion  is seen.  There are extensive bilateral emphysematous changes  No evidence of pulmonary embolism    8mm LLL nodule stable     CTA 9/12/2024:  There is very severe central lobar emphysematous change in the lungs  with an 8 mm  noncalcified subpleural nodule in the inferior left lung  base laterally and some linear scarring or discoid atelectasis in the  posterior left lower lobe but there is no significant alveolar  consolidation and no effusion or pneumothorax.    Bronchoscopy     None    Labs     Lab Results   Component Value Date    WBC 8.6 11/08/2024    HGB 13.3 11/08/2024    HCT 43.1 11/08/2024    MCV 97 11/08/2024     11/08/2024     Lab Results   Component Value Date      (H) 2024     Lab Results   Component Value Date    EOSABS 0.03 2024       Echocardiogram     2024:  1. Left ventricular ejection fraction is hyperdynamic, by visual estimate at 80%.   2. Left ventricular diastolic filling was indeterminate.   3. Left ventricular cavity size is decreased.   4. There is normal right ventricular global systolic function.   5. Moderately enlarged right ventricle.   6. There is moderate mitral annular calcification.   7. Severely elevated right ventricular systolic pressure at 89mmHg   8. Moderate to severe aortic valve stenosis.   9. There is moderate to severe aortic valve cusp calcification.       ASSESSMENT & PLAN     Problem List Items Addressed This Visit       Chronic hypoxic respiratory failure (Multi)    COPD (chronic obstructive pulmonary disease) (Multi)    Lung nodule - Primary    Pulmonary HTN (Multi)        SUMMARY:  74 y.o. female  with severe GOLD group E COPD (FEV1 40%). Patient is doing overall well today. CAT score is 17. Absolute eosinophils 30. A1AT not tested. Exercise tolerance is severely limited and she is not able to safely walk the length of her driveway to reach her mailbox without the use of oxygen. Reviewed CT scan of her lungs with patient and friend today in the office. Has repeat echo to assess her aortic stenosis. No surgical interventions planned at this time. Pulmonary HTN combination of Group II and III.    PLAN:  -Bronchodilators: continue Trelegy Ellipta. Advised to use Duonebs q6 hours as needed not as around the clock therapy  -Vaccinations: influenza: received; pneumococcal: completed series;   -Lung cancer screeninmm lung nodule. Repeat CT chest due February - May 2025  -Smoking cessation: already quit  -Pulmonary rehab: would be a good candidate. Will discuss in further detail at next visit  -Oxygen: continue O2 3L/min at rest and 6L/min with exertion.  -Will repeat PFT and O2 desaturation study in January  -Will  reach out to research team again about enrolling her in [place] Study for azithromycin vs roflumilast  -Filled out paperwork for post office.    Follow-up: April 2025      Pat Dobbs DO  Staff Physician - Pulmonary & Critical Care  12/20/24 10:52 AM  Office number: [phone]   Fax number:  [phone]

## 2025-01-03 ENCOUNTER — PATIENT OUTREACH (OUTPATIENT)
Dept: PRIMARY CARE | Facility: CLINIC | Age: 75
End: 2025-01-03
Payer: MEDICARE

## 2025-01-03 NOTE — PROGRESS NOTES
Successful outreach to patient regarding hospitalization as patient continues TCM program.   At time of outreach call the patient feels as if their condition has improved since initial visit with PCP or specialist.  Patient is continuing to use oxygen.  She's scheduled for PFT and oxygen studies later this month. Recent follow up with pulmonology.  She has stopped using the nebulizer every six hours scheduled and changed to prn use. She reports since doing this, she is feeling better. Also states her oxygen levels are now around 97%.   Questions or concerns addressed at this time with patient.   Provided contact information to patient if any further non-emergent needs arise.

## 2025-01-20 ENCOUNTER — APPOINTMENT (OUTPATIENT)
Dept: RESPIRATORY THERAPY | Facility: CLINIC | Age: 75
End: 2025-01-20
Payer: MEDICARE

## 2025-01-27 ENCOUNTER — APPOINTMENT (OUTPATIENT)
Dept: PRIMARY CARE | Facility: CLINIC | Age: 75
End: 2025-01-27
Payer: MEDICARE

## 2025-01-28 ENCOUNTER — APPOINTMENT (OUTPATIENT)
Dept: PULMONOLOGY | Facility: CLINIC | Age: 75
End: 2025-01-28
Payer: MEDICARE

## 2025-02-03 ENCOUNTER — TELEPHONE (OUTPATIENT)
Dept: CARDIAC REHAB | Facility: CLINIC | Age: 75
End: 2025-02-03
Payer: MEDICARE

## 2025-02-03 NOTE — TELEPHONE ENCOUNTER
Pt wanted me to reach out to her about pulmonary rehab in February. Spoke with pt back on 11/18/24.

## 2025-02-04 ENCOUNTER — APPOINTMENT (OUTPATIENT)
Dept: RESPIRATORY THERAPY | Facility: CLINIC | Age: 75
End: 2025-02-04
Payer: MEDICARE

## 2025-02-05 ENCOUNTER — TELEPHONE (OUTPATIENT)
Dept: PRIMARY CARE | Facility: CLINIC | Age: 75
End: 2025-02-05
Payer: MEDICARE

## 2025-02-05 NOTE — TELEPHONE ENCOUNTER
A pa was done I dont know who completed it - but trelegy was denied by medicare D due to other lower cost meds available  for the pt

## 2025-02-06 ENCOUNTER — APPOINTMENT (OUTPATIENT)
Dept: PRIMARY CARE | Facility: CLINIC | Age: 75
End: 2025-02-06
Payer: MEDICARE

## 2025-02-27 DIAGNOSIS — J44.9 CHRONIC OBSTRUCTIVE PULMONARY DISEASE, UNSPECIFIED COPD TYPE (MULTI): ICD-10-CM

## 2025-02-27 RX ORDER — FLUTICASONE FUROATE, UMECLIDINIUM BROMIDE AND VILANTEROL TRIFENATATE 100; 62.5; 25 UG/1; UG/1; UG/1
POWDER RESPIRATORY (INHALATION) DAILY
Qty: 180 EACH | Refills: 1 | Status: SHIPPED | OUTPATIENT
Start: 2025-02-27

## 2025-03-04 ENCOUNTER — TELEPHONE (OUTPATIENT)
Dept: PULMONOLOGY | Facility: CLINIC | Age: 75
End: 2025-03-04
Payer: MEDICARE

## 2025-03-04 DIAGNOSIS — J44.9 CHRONIC OBSTRUCTIVE PULMONARY DISEASE, UNSPECIFIED COPD TYPE (MULTI): Primary | ICD-10-CM

## 2025-03-04 DIAGNOSIS — J43.9 PULMONARY EMPHYSEMA, UNSPECIFIED EMPHYSEMA TYPE (MULTI): ICD-10-CM

## 2025-03-04 RX ORDER — AZITHROMYCIN 250 MG/1
250 TABLET, FILM COATED ORAL DAILY
Qty: 30 TABLET | Refills: 3 | Status: CANCELLED | OUTPATIENT
Start: 2025-04-01 | End: 2025-07-30

## 2025-03-04 RX ORDER — AZITHROMYCIN 250 MG/1
250 TABLET, FILM COATED ORAL 3 TIMES WEEKLY
Qty: 15 TABLET | Refills: 0 | Status: SHIPPED | OUTPATIENT
Start: 2025-03-05 | End: 2025-04-09

## 2025-03-04 NOTE — TELEPHONE ENCOUNTER
Spoke with patient regarding enrollment in the [place] clinical trial.  She has been randomized to the azithromycin group.  We will start her at 250 mg Monday/Wednesday/Friday for 4 weeks.  She has an appointment coming up with me on April 7, 2025 and will decide then whether to do 250mg daily vs 500mg M/W/F. All of her questions were answered. She is feeling well today.

## 2025-03-04 NOTE — PROGRESS NOTES
New Medications Ordered This Visit   Medications    azithromycin (Zithromax) 250 mg tablet     Sig: Take 1 tablet (250 mg) by mouth 3 times a week.     Dispense:  15 tablet     Refill:  0

## 2025-03-05 ENCOUNTER — PATIENT OUTREACH (OUTPATIENT)
Dept: PRIMARY CARE | Facility: CLINIC | Age: 75
End: 2025-03-05
Payer: MEDICARE

## 2025-03-05 NOTE — PROGRESS NOTES
TCM RN Outreach  Final call back to assess needs post discharge. Left voicemail for patient. Contact information provided.   Patient has met target of no readmission for 90 days post hospital discharge and is graduated from Transitional Care Management program at this time.

## 2025-04-02 DIAGNOSIS — J44.1 COPD EXACERBATION (MULTI): ICD-10-CM

## 2025-04-02 RX ORDER — IPRATROPIUM BROMIDE AND ALBUTEROL SULFATE 2.5; .5 MG/3ML; MG/3ML
SOLUTION RESPIRATORY (INHALATION)
Qty: 360 ML | Refills: 1 | Status: SHIPPED | OUTPATIENT
Start: 2025-04-02

## 2025-04-07 ENCOUNTER — OFFICE VISIT (OUTPATIENT)
Dept: PULMONOLOGY | Facility: CLINIC | Age: 75
End: 2025-04-07
Payer: MEDICARE

## 2025-04-07 ENCOUNTER — HOSPITAL ENCOUNTER (OUTPATIENT)
Facility: CLINIC | Age: 75
Discharge: HOME | End: 2025-04-07
Payer: MEDICARE

## 2025-04-07 VITALS
RESPIRATION RATE: 20 BRPM | OXYGEN SATURATION: 92 % | BODY MASS INDEX: 19.32 KG/M2 | HEIGHT: 62 IN | WEIGHT: 105 LBS | SYSTOLIC BLOOD PRESSURE: 148 MMHG | HEART RATE: 98 BPM | TEMPERATURE: 97.4 F | DIASTOLIC BLOOD PRESSURE: 76 MMHG

## 2025-04-07 DIAGNOSIS — R91.1 LUNG NODULE: ICD-10-CM

## 2025-04-07 DIAGNOSIS — J44.9 CHRONIC OBSTRUCTIVE PULMONARY DISEASE, UNSPECIFIED COPD TYPE (MULTI): Primary | ICD-10-CM

## 2025-04-07 DIAGNOSIS — J44.9 CHRONIC OBSTRUCTIVE PULMONARY DISEASE, UNSPECIFIED COPD TYPE (MULTI): ICD-10-CM

## 2025-04-07 PROCEDURE — 94618 PULMONARY STRESS TESTING: CPT | Performed by: INTERNAL MEDICINE

## 2025-04-07 PROCEDURE — 99214 OFFICE O/P EST MOD 30 MIN: CPT | Performed by: INTERNAL MEDICINE

## 2025-04-07 PROCEDURE — 3078F DIAST BP <80 MM HG: CPT | Performed by: INTERNAL MEDICINE

## 2025-04-07 PROCEDURE — 1036F TOBACCO NON-USER: CPT | Performed by: INTERNAL MEDICINE

## 2025-04-07 PROCEDURE — 1123F ACP DISCUSS/DSCN MKR DOCD: CPT | Performed by: INTERNAL MEDICINE

## 2025-04-07 PROCEDURE — 3077F SYST BP >= 140 MM HG: CPT | Performed by: INTERNAL MEDICINE

## 2025-04-07 PROCEDURE — 1159F MED LIST DOCD IN RCRD: CPT | Performed by: INTERNAL MEDICINE

## 2025-04-07 RX ORDER — AZITHROMYCIN 250 MG/1
500 TABLET, FILM COATED ORAL 3 TIMES WEEKLY
Qty: 77 TABLET | Refills: 1 | Status: SHIPPED | OUTPATIENT
Start: 2025-04-07 | End: 2025-10-04

## 2025-04-07 ASSESSMENT — COPD QUESTIONNAIRES
QUESTION3_CHESTTIGHTNESS: 0 - MY CHEST DOES NOT FEEL TIGHT AT ALL
QUESTION5_HOMEACTIVITIES: 3
QUESTION1_COUGHFREQUENCY: 1
QUESTION4_WALKINCLINE: 3
QUESTION6_LEAVINGHOUSE: 0 - I AM CONFIDENT LEAVING MY HOME DESPITE MY LUNG CONDITION
QUESTION7_SLEEPQUALITY: 1
QUESTION2_CHESTPHLEGM: 0 - I HAVE NO PHLEGM (MUCUS) IN MY CHEST AT ALL
CAT_TOTALSCORE: 11
QUESTION8_ENERGYLEVEL: 3

## 2025-04-07 NOTE — PROGRESS NOTES
Department of Medicine  Division of Pulmonary, Critical Care, and Sleep Medicine  Follow Up  [facility], [address]    Humera Villegas is a 74 y.o. female who returns as a follow up for COPD. Last visit was on 12/20/2024.    Physician HPI (4/7/2025):  We initiated azithromycin 250mg M/W/F earlier in March after she was enrolled in the [place] clinical trial. Tolerating the medication without any notable side effects. Using Trelegy daily. Only using DuoNebs before bed time as it causes her tremors. No sputum production, cough, wheeze. Dyspnea is manageable. Admits to leaving her O2 at 4L/min at home rather than increasing it to the prescribed 6L/min with exertion. Has not gotten repeat PFT or O2 desat study citing weather and transportation as a problem. She would like to opt out of repeat PFT if it will not .    Per previous notes:  12/20/2024:  Admitted to [facility] from 11/5/2024-11/8/2024 for AECOPD and acute on chronic hypoxic respiratory failure. She also has a history of severe aortic stenosis, pulmonary HTN, LLL 8mm nodule. Her most recent FEV1 was 40% in 2020.     She has both good days and bad days in terms of her breathing.  She has been using both Trelegy Ellipta and DuoNebs every 6 hours at home.  She is minimally ambulatory and gets quite winded with minimal exertion.  She brought a form from the post office requesting that her mail be delivered directly to her doorstep so she would not have to walk the 75 feet of the length of her driveway.  She also states that she not receive a phone call from the [facility] research study team.    11/6/2024:  74 y.o. female admitted on 11/5/2024  4:07 PM for hypoxia. She was seen by Dr. Smith in the office yesterday as a routine follow up and was noted to be hypoxic to 65%. She was placed on 10L/min O2 and sent to the ER. She was admitted in September for similar presentation of hypoxia. After her last admission she was discharged on a  "prednisone taper and states that once her prednisone finished, she started becoming more dyspneic with exertion. Minimal activity causes her to be dyspneic. She denies wheezing. She has an occasional morning productive cough and complains of runny nose. She is able to walk up 12 steps at home, but has to sit and rest for a few minutes to \"recover my oxygen.\"     She has a history of severe COPD (FEV1 40% in 2020), chronic hypoxic respiratory failure on 3L/min, severe aortic stenosis and pulmonary HTN. She is a former smoker, and is maintained on Trelegy Ellipta. She has a nebulizer at home, but does not use it.     Labs on admission notable for , troponin 7, WBC 5.5. CTA was done ruling out PE but was notable for severe bilateral emphysema, and previously noted LLL nodule was still present.       I have personally reviewed PMH, PSH, Family History in the HISTORY tab of this chart, and unless noted above are not pertinent to the presenting complaint.  I have personally reviewed Social History as provided in the electronic medical record.    Immunization History:  Immunization History   Administered Date(s) Administered    Flu vaccine, quadrivalent, high-dose, preservative free, age 65y+ (FLUZONE) 09/11/2020, 10/04/2022    Flu vaccine, trivalent, preservative free, HIGH-DOSE, age 65y+ (Fluzone) 09/29/2016, 09/26/2017, 09/17/2018    Influenza, Seasonal, Quadrivalent, Adjuvanted 10/05/2021, 09/08/2023    Influenza, trivalent, adjuvanted 09/16/2019    Moderna SARS-CoV-2 Vaccination 02/26/2021, 03/26/2021, 10/26/2021    Pneumococcal conjugate vaccine, 13-valent (PREVNAR 13) 09/09/2018    Pneumococcal conjugate vaccine, 20-valent (PREVNAR 20) 09/08/2023    Pneumococcal polysaccharide vaccine, 23-valent, age 2 years and older (PNEUMOVAX 23) 11/03/2016    Zoster vaccine, recombinant, adult (SHINGRIX) 11/05/2018, 03/12/2019       Current Medications:  Current Outpatient Medications   Medication Instructions    " "albuterol (Ventolin HFA) 90 mcg/actuation inhaler 2 puffs, inhalation, Every 4 hours PRN    amLODIPine (NORVASC) 2.5 mg, oral, Daily    atorvastatin (LIPITOR) 20 mg, oral, Daily    azelastine (Astelin) 137 mcg (0.1 %) nasal spray 1 spray, Each Nostril, 2 times daily, Use in each nostril as directed    azithromycin (ZITHROMAX) 250 mg, oral, 3 times weekly    cholecalciferol (Vitamin D3) 25 MCG (1000 UT) tablet 1 tablet, Daily    cyanocobalamin (VITAMIN B-12) 1,000 mcg, oral, Daily    denosumab (Prolia) 60 mg/mL syringe 1 mL, Every 6 months    fluticasone (Flonase) 50 mcg/actuation nasal spray 2 sprays, Each Nostril, Daily, Shake gently. Before first use, prime pump. After use, clean tip and replace cap.    fluticasone-umeclidin-vilanter (Trelegy Ellipta) 100-62.5-25 mcg blister with device inhalation, Daily    ipratropium-albuteroL (Duo-Neb) 0.5-2.5 mg/3 mL nebulizer solution USE 3 ML VIA NEBULIZER FOUR TIMES DAILY    nebulizers (Compact Compressor Nebulizer) misc Use with Duoneb every 6 hours routine for copd    nebulizers misc Use every 6 hours with Duoneb nebulizer medication    omeprazole (PRILOSEC) 20 mg, oral, Daily    oxygen (O2) 3 L/min, Continuous    silicone,dressing-foam bandage 9 X 9 \" bandage 1 patch, topical (top), Daily        Drug Allergies/Intolerances:  Allergies   Allergen Reactions    Amoxicillin Unknown    Penicillin Other        Review of Systems:  Review of Systems     All other review of systems are negative and/or non-contributory.    Physical Examination:  Vitals:    04/07/25 1315   BP: 148/76   BP Location: Right arm   Patient Position: Sitting   Pulse: 98   Resp: 20   Temp: 36.3 °C (97.4 °F)   TempSrc: Temporal   SpO2: 92%   Weight: 47.6 kg (105 lb)   Height: 1.575 m (5' 2\")          GEN: breathing well  ENT: wearing O2 nasal cannula  CV: RRR, no m/g/r  LUNGS: good effort, quiet bilateral breath sounds      Exacerbation History     November 2024 (admitted)  September 2024    Pulmonary " Function Test Results     2020:  FVC: 2.16 L (81%)  FEV1: 0.84 L (40%)  FEV1/FVC: 39  T.74 L (98%)  RV: 155%  DLCO: 28%       O2 Requirements     6MWT: none    Sleep Study     None    CAT score     2025: 11  2024: 17    Peak Flow and ACT     N/A    Chest Radiograph     2024:  Extensive COPD changes.       Slight worsening patchy basilar opacities suggest possible edema or   pneumonia.     Chest CT Scan     CTA 2024:  The trachea and central airways are patent. No endobronchial lesion  is seen.  There are extensive bilateral emphysematous changes  No evidence of pulmonary embolism     8mm LLL nodule stable     CTA 2024:  There is very severe central lobar emphysematous change in the lungs  with an 8 mm  noncalcified subpleural nodule in the inferior left lung  base laterally and some linear scarring or discoid atelectasis in the  posterior left lower lobe but there is no significant alveolar  consolidation and no effusion or pneumothorax.    Bronchoscopy     None    Labs     Lab Results   Component Value Date    WBC 8.6 2024    HGB 13.3 2024    HCT 43.1 2024    MCV 97 2024     2024     Lab Results   Component Value Date     (H) 2024     Lab Results   Component Value Date    EOSABS 0.03 2024       Echocardiogram     No results found for this or any previous visit from the past 365 days.       ASSESSMENT & PLAN     Problem List Items Addressed This Visit       COPD (chronic obstructive pulmonary disease) (Multi) - Primary    Relevant Medications    azithromycin (Zithromax) 250 mg tablet    Other Relevant Orders    Oximetry Desat/O2 Titration (Exercise Desat)    Lung nodule    Relevant Orders    CT chest wo IV contrast        SUMMARY:  74 y.o. female  with severe GOLD group E COPD (FEV1 40%). Patient is doing overall well. CAT score is 11. Absolute eosinophils 30. A1AT not tested.    PLAN:  -Bronchodilators: Continue Trelegy  Ellipta. Advised that she can use Duonebs q6 PRN rather than around the clock  -Will increase azithromycin to 500mg M/W/  -Vaccinations: influenza: received; pneumococcal: completed series;   -Lung cancer screeninmm lung nodule. Repeat CT chest now  -Smoking cessation: quit  -Oxygen: continue O2 3L/min at rest and 6L/min with exertion   -Will O2 desaturation study today to see about adjusting her O2 prescription  -Patient declined repeat PFT    Follow-up: 2025 virtual      Pat Dobbs DO  Staff Physician - Pulmonary & Critical Care  25 1:04 PM  Office number: [phone]   Fax number:  [phone]

## 2025-04-17 ENCOUNTER — OFFICE VISIT (OUTPATIENT)
Dept: CARDIOLOGY | Facility: HOSPITAL | Age: 75
End: 2025-04-17
Payer: MEDICARE

## 2025-04-17 ENCOUNTER — HOSPITAL ENCOUNTER (OUTPATIENT)
Dept: CARDIOLOGY | Facility: HOSPITAL | Age: 75
Discharge: HOME | End: 2025-04-17
Payer: MEDICARE

## 2025-04-17 VITALS
SYSTOLIC BLOOD PRESSURE: 134 MMHG | BODY MASS INDEX: 19.32 KG/M2 | OXYGEN SATURATION: 92 % | DIASTOLIC BLOOD PRESSURE: 74 MMHG | HEART RATE: 101 BPM | WEIGHT: 105 LBS | HEIGHT: 62 IN

## 2025-04-17 DIAGNOSIS — E78.5 HYPERLIPIDEMIA, UNSPECIFIED HYPERLIPIDEMIA TYPE: Primary | ICD-10-CM

## 2025-04-17 DIAGNOSIS — I35.0 NONRHEUMATIC AORTIC VALVE STENOSIS: ICD-10-CM

## 2025-04-17 DIAGNOSIS — I35.0 MODERATE AORTIC STENOSIS: ICD-10-CM

## 2025-04-17 DIAGNOSIS — I10 BENIGN ESSENTIAL HYPERTENSION: ICD-10-CM

## 2025-04-17 PROBLEM — R01.1 SYSTOLIC EJECTION MURMUR: Status: RESOLVED | Noted: 2023-12-04 | Resolved: 2025-04-17

## 2025-04-17 LAB
AORTIC VALVE MEAN GRADIENT: 27 MMHG
AORTIC VALVE PEAK VELOCITY: 3.63 M/S
AV PEAK GRADIENT: 53 MMHG
AVA (PEAK VEL): 0.78 CM2
AVA (VTI): 0.89 CM2
EJECTION FRACTION APICAL 4 CHAMBER: 74.1
EJECTION FRACTION: 70 %
LEFT ATRIUM VOLUME AREA LENGTH INDEX BSA: 25.8 ML/M2
LEFT VENTRICLE INTERNAL DIMENSION DIASTOLE: 3.23 CM (ref 3.5–6)
LEFT VENTRICULAR OUTFLOW TRACT DIAMETER: 1.97 CM
MITRAL VALVE E/A RATIO: 0.95
RIGHT VENTRICLE FREE WALL PEAK S': 11 CM/S
RIGHT VENTRICLE PEAK SYSTOLIC PRESSURE: 67.8 MMHG
TRICUSPID ANNULAR PLANE SYSTOLIC EXCURSION: 2.4 CM

## 2025-04-17 PROCEDURE — 3078F DIAST BP <80 MM HG: CPT | Performed by: INTERNAL MEDICINE

## 2025-04-17 PROCEDURE — 93306 TTE W/DOPPLER COMPLETE: CPT | Performed by: INTERNAL MEDICINE

## 2025-04-17 PROCEDURE — 1159F MED LIST DOCD IN RCRD: CPT | Performed by: INTERNAL MEDICINE

## 2025-04-17 PROCEDURE — 3075F SYST BP GE 130 - 139MM HG: CPT | Performed by: INTERNAL MEDICINE

## 2025-04-17 PROCEDURE — 99214 OFFICE O/P EST MOD 30 MIN: CPT | Mod: 25 | Performed by: INTERNAL MEDICINE

## 2025-04-17 PROCEDURE — 93306 TTE W/DOPPLER COMPLETE: CPT

## 2025-04-17 PROCEDURE — 1036F TOBACCO NON-USER: CPT | Performed by: INTERNAL MEDICINE

## 2025-04-17 PROCEDURE — 99214 OFFICE O/P EST MOD 30 MIN: CPT | Performed by: INTERNAL MEDICINE

## 2025-04-17 PROCEDURE — G2211 COMPLEX E/M VISIT ADD ON: HCPCS | Performed by: INTERNAL MEDICINE

## 2025-04-17 PROCEDURE — 1123F ACP DISCUSS/DSCN MKR DOCD: CPT | Performed by: INTERNAL MEDICINE

## 2025-04-17 NOTE — PROGRESS NOTES
"Chief Complaint:   No chief complaint on file.     History Of Present Illness:    Humera Villegas is a 74 y.o. female here for follow-up regarding aortic stenosis. She has a history of moderate-severe aortic stenosis, hypertension, and COPD/severe emphysema with chronic hypoxic respiratory failure and group III pulmonary hypertension.    She reports feeling well and offers no specific complaints. Reports stable marked exertional shortness of breath.        Last Recorded Vitals:  Vitals:    04/17/25 1411   BP: 134/74   Pulse: 101   SpO2: 92%   Weight: 47.6 kg (105 lb)   Height: 1.575 m (5' 2\")             Allergies:  Amoxicillin and Penicillin    Outpatient Medications:  Current Outpatient Medications   Medication Instructions    albuterol (Ventolin HFA) 90 mcg/actuation inhaler 2 puffs, inhalation, Every 4 hours PRN    amLODIPine (NORVASC) 2.5 mg, oral, Daily    atorvastatin (LIPITOR) 20 mg, oral, Daily    azelastine (Astelin) 137 mcg (0.1 %) nasal spray 1 spray, Each Nostril, 2 times daily, Use in each nostril as directed    azithromycin (ZITHROMAX) 500 mg, oral, 3 times weekly    cholecalciferol (Vitamin D3) 25 MCG (1000 UT) tablet 1 tablet, Daily    cyanocobalamin (VITAMIN B-12) 1,000 mcg, oral, Daily    denosumab (Prolia) 60 mg/mL syringe 1 mL, Every 6 months    fluticasone (Flonase) 50 mcg/actuation nasal spray 2 sprays, Each Nostril, Daily, Shake gently. Before first use, prime pump. After use, clean tip and replace cap.    fluticasone-umeclidin-vilanter (Trelegy Ellipta) 100-62.5-25 mcg blister with device inhalation, Daily    ipratropium-albuteroL (Duo-Neb) 0.5-2.5 mg/3 mL nebulizer solution USE 3 ML VIA NEBULIZER FOUR TIMES DAILY    nebulizers (Compact Compressor Nebulizer) misc Use with Duoneb every 6 hours routine for copd    nebulizers misc Use every 6 hours with Duoneb nebulizer medication    omeprazole (PRILOSEC) 20 mg, oral, Daily    oxygen (O2) 3 L/min, Continuous         Physical Exam:  Gen Chronically " ill appearing septuagenarian female sitting up in NAD. Body mass index is 19.2 kg/m².   CV rrr. 2/6 LAVELL. No JVD or leg edema.  Pulm Reduced throughout. Lungs are otherwise clear with normal respiratory effort.  Neuro Alert and conversant. Grossly nonfocal.        I reviewed most recent imaging / labs / and office notes as well as details of any recent hospitalizations or ED visits.    Assessment/Plan   1.  Aortic stenosis  Moderate-severe but more normal flow, low gradient by TTE today. More moderate on exam. Will send for an aortic valve calcium score and for TAVR referral if her score is > 1200.     2. Hypertension   BP well controlled. Her present regimen is to continue.     3. Hyperlipidemia   On statin. Lipids well managed. Her present regimen is to continue.         Follow-up 6 months         Micheal Arroyo MD

## 2025-05-14 ENCOUNTER — NURSING HOME VISIT (OUTPATIENT)
Dept: POST ACUTE CARE | Facility: EXTERNAL LOCATION | Age: 75
End: 2025-05-14
Payer: MEDICARE

## 2025-05-14 DIAGNOSIS — J44.1 COPD EXACERBATION (MULTI): ICD-10-CM

## 2025-05-14 DIAGNOSIS — R53.81 PHYSICAL DECONDITIONING: ICD-10-CM

## 2025-05-14 DIAGNOSIS — J96.01 ACUTE RESPIRATORY FAILURE WITH HYPOXIA AND HYPERCAPNIA: Primary | ICD-10-CM

## 2025-05-14 DIAGNOSIS — E78.00 PURE HYPERCHOLESTEROLEMIA: ICD-10-CM

## 2025-05-14 DIAGNOSIS — M80.00XG AGE-RELATED OSTEOPOROSIS WITH CURRENT PATHOLOGICAL FRACTURE WITH DELAYED HEALING, SUBSEQUENT ENCOUNTER: ICD-10-CM

## 2025-05-14 DIAGNOSIS — K21.9 GASTROESOPHAGEAL REFLUX DISEASE WITHOUT ESOPHAGITIS: ICD-10-CM

## 2025-05-14 DIAGNOSIS — J96.02 ACUTE RESPIRATORY FAILURE WITH HYPOXIA AND HYPERCAPNIA: Primary | ICD-10-CM

## 2025-05-14 DIAGNOSIS — I10 BENIGN ESSENTIAL HYPERTENSION: ICD-10-CM

## 2025-05-14 DIAGNOSIS — I35.0 MODERATE AORTIC STENOSIS: ICD-10-CM

## 2025-05-14 DIAGNOSIS — S22.072G: ICD-10-CM

## 2025-05-14 DIAGNOSIS — J30.1 ALLERGIC RHINITIS DUE TO POLLEN, UNSPECIFIED SEASONALITY: ICD-10-CM

## 2025-05-14 DIAGNOSIS — I50.32 CHRONIC DIASTOLIC (CONGESTIVE) HEART FAILURE: ICD-10-CM

## 2025-05-14 DIAGNOSIS — I27.20 PULMONARY HTN (MULTI): ICD-10-CM

## 2025-05-14 PROCEDURE — 99306 1ST NF CARE HIGH MDM 50: CPT | Performed by: FAMILY MEDICINE

## 2025-05-14 ASSESSMENT — ENCOUNTER SYMPTOMS
CHILLS: 0
VOMITING: 0
ENDOCRINE NEGATIVE: 1
FATIGUE: 1
COUGH: 1
SHORTNESS OF BREATH: 1
FEVER: 0
NAUSEA: 0
MYALGIAS: 1
BACK PAIN: 1
ALLERGIC/IMMUNOLOGIC NEGATIVE: 1
SLEEP DISTURBANCE: 1
ACTIVITY CHANGE: 1
CONSTIPATION: 1
NERVOUS/ANXIOUS: 1
CARDIOVASCULAR NEGATIVE: 1
APPETITE CHANGE: 1
ARTHRALGIAS: 1
SINUS PRESSURE: 1
HEMATOLOGIC/LYMPHATIC NEGATIVE: 1
WHEEZING: 1

## 2025-05-14 NOTE — LETTER
Patient: Humera Villegas  : 1950    Encounter Date: 2025    Subjective  Patient ID: Humera Villegas is a 74 y.o. female who presents for No chief complaint on file..      HPI  74 F h/o COPD, chronic hypoxic respiratory failure (3L NC) severe aortic stenosis, HTN, p/w SOB & back pain, CT TS T8 chronic compression deformity >50% ht loss no retropulsion, T10 burst fx > 50% ht loss, min retropulsion, admitted to [facility] for rehab.    Patient presented to ED on May 3 with shortness of breath and back pain.  She has been hospitalized and been treated for a COPD exacerbation. And initially treated for Pneumonia.   Her back pain never improved, CT thoracic spine obtained with findings above.  Patient denies any weakness, numbness, paresthesias, saddle anesthesia, bowel bladder incontinence  Today she states that she is SOB on ambulation.   Continues to have mderate to severe pain in the back  Using 3-4 Liters of O2. Desaturates often     Review of Systems   Constitutional:  Positive for activity change, appetite change and fatigue. Negative for chills and fever.   HENT:  Positive for sinus pressure.    Respiratory:  Positive for cough, shortness of breath and wheezing.    Cardiovascular: Negative.    Gastrointestinal:  Positive for constipation. Negative for nausea and vomiting.   Endocrine: Negative.    Genitourinary: Negative.    Musculoskeletal:  Positive for arthralgias, back pain, gait problem and myalgias.   Skin: Negative.  Negative for rash.   Allergic/Immunologic: Negative.    Hematological: Negative.    Psychiatric/Behavioral:  Positive for sleep disturbance. The patient is nervous/anxious.    All other systems reviewed and are negative.      Objective  There were no vitals taken for this visit.  BSA: There is no height or weight on file to calculate BSA.  Growth percentiles: Facility age limit for growth %melodie is 20 years. Facility age limit for growth %melodie is 20 years.   No visits with  results within 1 Week(s) from this visit.   Latest known visit with results is:   Hospital Outpatient Visit on 04/17/2025   Component Date Value Ref Range Status   • AV pk micaela 04/17/2025 3.63  m/s Final   • AV mn grad 04/17/2025 27  mmHg Final   • LVOT diam 04/17/2025 1.97  cm Final   • MV E/A ratio 04/17/2025 0.95   Final   • LA vol index A/L 04/17/2025 25.8  ml/m2 Final   • Tricuspid annular plane systolic e* 04/17/2025 2.4  cm Final   • LV EF 04/17/2025 70  % Final   • RV free wall pk S' 04/17/2025 11.00  cm/s Final   • LVIDd 04/17/2025 3.23  cm Final   • RVSP 04/17/2025 67.8  mmHg Final   • Aortic Valve Area by Continuity of* 04/17/2025 0.89  cm2 Final   • Aortic Valve Area by Continuity of* 04/17/2025 0.78  cm2 Final   • AV pk grad 04/17/2025 53  mmHg Final   • LV A4C EF 04/17/2025 74.1   Final      Physical Exam  Constitutional:       General: She is not in acute distress.     Appearance: She is ill-appearing. She is not toxic-appearing.   HENT:      Head: Normocephalic.      Nose: Rhinorrhea present.   Eyes:      Pupils: Pupils are equal, round, and reactive to light.   Cardiovascular:      Rate and Rhythm: Normal rate and regular rhythm.      Heart sounds: Murmur heard.   Pulmonary:      Breath sounds: No wheezing.      Comments: Very diminished air entry  Abdominal:      Palpations: Abdomen is soft.   Musculoskeletal:         General: Tenderness present.   Neurological:      General: No focal deficit present.         Assessment/Plan  Problem List Items Addressed This Visit       Benign essential hypertension    AR (allergic rhinitis)    Gastroesophageal reflux disease    Osteoporosis    COPD exacerbation (Multi)    Moderate aortic stenosis    Physical deconditioning    Pulmonary HTN (Multi)    Hyperlipidemia    Acute respiratory failure with hypoxia and hypercapnia - Primary    Chronic diastolic (congestive) heart failure    Closed unstable burst fracture of tenth thoracic vertebra with delayed healing        74 F h/o COPD, chronic hypoxic respiratory failure (3L NC) severe aortic stenosis, HTN, p/w SOB & back pain, CT TS T8 chronic compression deformity >50% ht loss no retropulsion, T10 burst fx > 50% ht loss, min retropulsion,       T10 burst fracture   - Would maintain hard TLSO brace when patient is upright, when they are ambulating, and when they are active.  Can remove brace for changing clothes and bathing  -  physical therapy and Occupational Therapy, patient needs to be in brace   - Pain control Lidocaine Patch Robaxin Tylenol TRamadol  - Will arrange for follow-up with repeat x-rays in 6 weeks with outpatient neurosurgery     Severe COPD (GOLD E) with acute exacerbation: . Managed with Trelegy 100, and albuterol HFA and DuoNebs PRN. Presented with worsening shortness of breath and cough. Has increased oxygen need at present. Was on BiPap in ER   Baseline O2 3l-6l at home  On 5/10 began prednisone taper 40mgx3 days, 30mgx3 days, 20mgx3 days, 10mg x3 days (Start on 5/9)  Continue DuoNebs TID.    Moderate to severe Aortic stenosis /HTN/HLD c/w Amlodipine and Atorvastatin    GERD/Esophageal dysmotility  c/w Omeprazole    Physical Deconditioning OT/PT . Lives alone      PLAN:Reviewed orders, medications, records, and pertinent labs/x-rays from   hospital. Monitor VS, BS, O2, etc as per protocol. See written orders. PT/OT   will evaluate and start appropriate rehabilitation program. Reviewed and signed   off orders, medications,Labs, x-rays, and current diagnoses. Reviewed and   updated CPR status and any changes in Advanced directives. Continue Rehab Will   see 1-2 times weekly for next 30 days then reassess. Will always see at   resident, family , or nursing request. Discharge Planning   Time   Time Spent With Patient: 60 minutes            Electronically Signed By: Fabio Chou MD   5/15/25  6:45 AM

## 2025-05-15 ENCOUNTER — NURSING HOME VISIT (OUTPATIENT)
Dept: POST ACUTE CARE | Facility: EXTERNAL LOCATION | Age: 75
End: 2025-05-15
Payer: MEDICARE

## 2025-05-15 DIAGNOSIS — S22.072G: Primary | ICD-10-CM

## 2025-05-15 DIAGNOSIS — J43.9 PULMONARY EMPHYSEMA, UNSPECIFIED EMPHYSEMA TYPE (MULTI): ICD-10-CM

## 2025-05-15 DIAGNOSIS — E78.00 ELEVATED LDL CHOLESTEROL LEVEL: ICD-10-CM

## 2025-05-15 DIAGNOSIS — K21.9 GASTROESOPHAGEAL REFLUX DISEASE WITHOUT ESOPHAGITIS: ICD-10-CM

## 2025-05-15 DIAGNOSIS — R53.81 PHYSICAL DECONDITIONING: ICD-10-CM

## 2025-05-15 DIAGNOSIS — F41.9 ANXIETY: ICD-10-CM

## 2025-05-15 DIAGNOSIS — M54.6 THORACIC BACK PAIN, UNSPECIFIED BACK PAIN LATERALITY, UNSPECIFIED CHRONICITY: ICD-10-CM

## 2025-05-15 DIAGNOSIS — J96.21 ACUTE ON CHRONIC HYPOXIC RESPIRATORY FAILURE: ICD-10-CM

## 2025-05-15 DIAGNOSIS — I10 BENIGN ESSENTIAL HYPERTENSION: ICD-10-CM

## 2025-05-15 PROBLEM — J96.02 ACUTE RESPIRATORY FAILURE WITH HYPOXIA AND HYPERCAPNIA: Status: ACTIVE | Noted: 2025-05-15

## 2025-05-15 PROBLEM — I11.0 HYPERTENSIVE HEART DISEASE WITH HEART FAILURE: Status: ACTIVE | Noted: 2025-05-15

## 2025-05-15 PROBLEM — J96.12 CHRONIC RESPIRATORY FAILURE WITH HYPERCAPNIA: Status: ACTIVE | Noted: 2025-05-15

## 2025-05-15 PROBLEM — J96.02 ACUTE RESPIRATORY FAILURE WITH HYPERCAPNIA: Status: ACTIVE | Noted: 2025-05-15

## 2025-05-15 PROBLEM — I50.32 CHRONIC DIASTOLIC (CONGESTIVE) HEART FAILURE: Status: ACTIVE | Noted: 2025-05-15

## 2025-05-15 PROBLEM — J96.01 ACUTE RESPIRATORY FAILURE WITH HYPOXIA AND HYPERCAPNIA: Status: ACTIVE | Noted: 2025-05-15

## 2025-05-15 PROCEDURE — 99310 SBSQ NF CARE HIGH MDM 45: CPT | Performed by: PHYSICIAN ASSISTANT

## 2025-05-15 NOTE — ASSESSMENT & PLAN NOTE
Oxygen by nasal cannula, Trelegy, albuterol, DuoNebs 3 times daily, and currently on a prednisone taper

## 2025-05-15 NOTE — PROGRESS NOTES
PROGRESS NOTE    History Of Present Illness  Subjective   Patient ID: Humera Villegas is a 74 y.o. female who is acute skilled care being seen and evaluated for multiple medical problems.    HPI:  74 F h/o COPD, chronic hypoxic respiratory failure (3L NC) severe aortic stenosis, HTN, p/w SOB & back pain, CT TS T8 chronic compression deformity >50% ht loss no retropulsion, T10 burst fx > 50% ht loss, min retropulsion, admitted to [facility] for rehab.    Patient presented to ED on May 3 with shortness of breath and back pain.  She has been hospitalized and been treated for a COPD exacerbation. And initially treated for Pneumonia.   Her back pain never improved, CT thoracic spine obtained with findings above.  Patient denies any weakness, numbness, paresthesias, saddle anesthesia, bowel bladder incontinence  Patient is currently in bed but sitting up and ready for lunch.  She denies any pain or shortness of breath currently.  She was constipated for 2 days but had a bowel movement yesterday.  Patient walks with a walker.  She is a 1 assist for transfers.  Patient states she has pain when she is up walking or standing but no pain currently at rest..  Patient states her shortness of breath that she originally was taking in for was secondary to the amount of pain she was having in her back.  Patient is wearing a TLSO brace when up right, ambulating or active.      Past medical history: Acute hypoxic respiratory failure with hypercapnia.,  COPD, chronic hypoxic respiratory failure on 3 L of O2, chronic diastolic heart failure, hypertension, hyperlipidemia, pulmonary hypertension, aortic stenosis, allergic rhinitis, osteoporosis, pathological fracture with delayed healing and 1/10 thoracic vertebral unstable burst fracture.  Also GERD without esophagitis, anxiety, back pain, bursitis of the left shoulder, lumbar stenosis, protein calorie malnutrition, sacral wound.  Chronic compression deformity of the thoracic  spine with the more acute burst fracture.,  Shortness of breath    Medications: Prednisone, lidocaine patch, oxygen, Xanax as needed, amlodipine, DuoNebs, albuterol as needed, atorvastatin, Naprosyn, Tums as needed, bisacodyl as needed, tramadol as needed, Trelegy, methocarbamol as needed, omeprazole, B12    Allergies: Penicillin    Social history: Ex-smoker x 15 years, denies EtOH use     Allergies  Amoxicillin and Penicillin    Review of Systems  Patient denies chest pain, shortness of breath, nausea, vomiting, fever, chills, diarrhea, vision changes, rashes, dysuria, paresthesias, vertigo, headache, cough or cold symptoms, or any other complaints at this time. A complete review of systems was done, and is as stated in the history of present illness, is otherwise negative or not pertinent to the complaint.    Physical Exam  Physical exam: Vital signs and nurses notes were reviewed.  Blood pressure 121/76, temp 97.7, heart rate 79, SpO2 93% on oxygen    General:  no acute distress. Alert and oriented  x 4.     Head: atraumatic and normocephalic    Eyes: EOMs are intact, conjunctivae is not injected.    Oropharynx:  no trismus or drooling, buccal mucosa is moist.    Ears:  normal external exam, no swelling or erythema,     Nasal: normal external exam,     Neck: Supple, full range of motion,     Cardiac: Regular rate and rhythm. No murmurs noted.     Pulmonary: Lungs clear bilaterally with good aeration. No adventitious breath sounds. No wheezes rales or rhonchi. No accessory muscle use no retraction noted.    Abdomen: Soft,  Nontender. No guarding, rigidity, or distention. Normoactive bowel sounds. No pulsatile masses, no bruits.     Extremities: Patient moves all 4 extremities independently, no pitting edema    Skin: No rash seen. Skin is warm and dry     Neuro: Patient is alert and oriented x4. Speech is clear. There is no asymmetry with facial grimaces, and no tongue deviation. Patient moves all extremities  independently. Sensation is intact.  Patient is a 1 person assist for transfers and needs help with putting on her TLSO brace.  Uses walker for ambulation with the brace on     Assessment/Plan   Problem List Items Addressed This Visit           ICD-10-CM    Anxiety F41.9    Continue alprazolam as needed         Back pain M54.9    Lidocaine patch, methocarbamol, Tylenol, tramadol.  As needed for pain for burst fracture at T10.  Follow-up with neurosurgery with repeat x-rays in 6 weeks         Benign essential hypertension I10    Continue amlodipine as needed         COPD (chronic obstructive pulmonary disease) (Multi) J44.9    Oxygen by nasal cannula, Trelegy, albuterol, DuoNebs 3 times daily, and currently on a prednisone taper         Gastroesophageal reflux disease K21.9    Continue omeprazole         Elevated LDL cholesterol level E78.00    Continue atorvastatin         Acute on chronic hypoxic respiratory failure J96.21    Patient required BiPAP in the emergency room.  Currently on oxygen and 93% saturations.  Denies shortness of breath.  Patient has DuoNebs twice daily, albuterol as needed, Trelegy, currently on prednisone, continue oxygen         Physical deconditioning R53.81    Continue PT, OT, ST evaluation and treat         Closed unstable burst fracture of tenth thoracic vertebra with delayed healing - Primary S22.072G    Continue lidocaine patches, Robaxin, Tylenol, tramadol, Naprosyn etc. for pain.  She is to wear her TLSO brace with activity, ambulation, standing etc.  Follow-up with neurosurgery for repeat x-rays in 6 weeks as outpatient               Reviewed recent labs, vitals, progress notes and documents pertaining to this visit.    Monica Rodriguez PA-C

## 2025-05-15 NOTE — PROGRESS NOTES
Subjective   Patient ID: Humera Villegas is a 74 y.o. female who presents for No chief complaint on file..      HPI  74 F h/o COPD, chronic hypoxic respiratory failure (3L NC) severe aortic stenosis, HTN, p/w SOB & back pain, CT TS T8 chronic compression deformity >50% ht loss no retropulsion, T10 burst fx > 50% ht loss, min retropulsion, admitted to [facility] for rehab.    Patient presented to ED on May 3 with shortness of breath and back pain.  She has been hospitalized and been treated for a COPD exacerbation. And initially treated for Pneumonia.   Her back pain never improved, CT thoracic spine obtained with findings above.  Patient denies any weakness, numbness, paresthesias, saddle anesthesia, bowel bladder incontinence  Today she states that she is SOB on ambulation.   Continues to have mderate to severe pain in the back  Using 3-4 Liters of O2. Desaturates often     Review of Systems   Constitutional:  Positive for activity change, appetite change and fatigue. Negative for chills and fever.   HENT:  Positive for sinus pressure.    Respiratory:  Positive for cough, shortness of breath and wheezing.    Cardiovascular: Negative.    Gastrointestinal:  Positive for constipation. Negative for nausea and vomiting.   Endocrine: Negative.    Genitourinary: Negative.    Musculoskeletal:  Positive for arthralgias, back pain, gait problem and myalgias.   Skin: Negative.  Negative for rash.   Allergic/Immunologic: Negative.    Hematological: Negative.    Psychiatric/Behavioral:  Positive for sleep disturbance. The patient is nervous/anxious.    All other systems reviewed and are negative.      Objective   There were no vitals taken for this visit.  BSA: There is no height or weight on file to calculate BSA.  Growth percentiles: Facility age limit for growth %melodie is 20 years. Facility age limit for growth %melodie is 20 years.   No visits with results within 1 Week(s) from this visit.   Latest known visit with  results is:   Hospital Outpatient Visit on 04/17/2025   Component Date Value Ref Range Status    AV pk micaela 04/17/2025 3.63  m/s Final    AV mn grad 04/17/2025 27  mmHg Final    LVOT diam 04/17/2025 1.97  cm Final    MV E/A ratio 04/17/2025 0.95   Final    LA vol index A/L 04/17/2025 25.8  ml/m2 Final    Tricuspid annular plane systolic e* 04/17/2025 2.4  cm Final    LV EF 04/17/2025 70  % Final    RV free wall pk S' 04/17/2025 11.00  cm/s Final    LVIDd 04/17/2025 3.23  cm Final    RVSP 04/17/2025 67.8  mmHg Final    Aortic Valve Area by Continuity of* 04/17/2025 0.89  cm2 Final    Aortic Valve Area by Continuity of* 04/17/2025 0.78  cm2 Final    AV pk grad 04/17/2025 53  mmHg Final    LV A4C EF 04/17/2025 74.1   Final      Physical Exam  Constitutional:       General: She is not in acute distress.     Appearance: She is ill-appearing. She is not toxic-appearing.   HENT:      Head: Normocephalic.      Nose: Rhinorrhea present.   Eyes:      Pupils: Pupils are equal, round, and reactive to light.   Cardiovascular:      Rate and Rhythm: Normal rate and regular rhythm.      Heart sounds: Murmur heard.   Pulmonary:      Breath sounds: No wheezing.      Comments: Very diminished air entry  Abdominal:      Palpations: Abdomen is soft.   Musculoskeletal:         General: Tenderness present.   Neurological:      General: No focal deficit present.         Assessment/Plan   Problem List Items Addressed This Visit       Benign essential hypertension    AR (allergic rhinitis)    Gastroesophageal reflux disease    Osteoporosis    COPD exacerbation (Multi)    Moderate aortic stenosis    Physical deconditioning    Pulmonary HTN (Multi)    Hyperlipidemia    Acute respiratory failure with hypoxia and hypercapnia - Primary    Chronic diastolic (congestive) heart failure    Closed unstable burst fracture of tenth thoracic vertebra with delayed healing       74 F h/o COPD, chronic hypoxic respiratory failure (3L NC) severe aortic  stenosis, HTN, p/w SOB & back pain, CT TS T8 chronic compression deformity >50% ht loss no retropulsion, T10 burst fx > 50% ht loss, min retropulsion,       T10 burst fracture   - Would maintain hard TLSO brace when patient is upright, when they are ambulating, and when they are active.  Can remove brace for changing clothes and bathing  -  physical therapy and Occupational Therapy, patient needs to be in brace   - Pain control Lidocaine Patch Robaxin Tylenol TRamadol  - Will arrange for follow-up with repeat x-rays in 6 weeks with outpatient neurosurgery     Severe COPD (GOLD E) with acute exacerbation: . Managed with Trelegy 100, and albuterol HFA and DuoNebs PRN. Presented with worsening shortness of breath and cough. Has increased oxygen need at present. Was on BiPap in ER   Baseline O2 3l-6l at home  On 5/10 began prednisone taper 40mgx3 days, 30mgx3 days, 20mgx3 days, 10mg x3 days (Start on 5/9)  Continue DuoNebs TID.    Moderate to severe Aortic stenosis /HTN/HLD c/w Amlodipine and Atorvastatin    GERD/Esophageal dysmotility  c/w Omeprazole    Physical Deconditioning OT/PT . Lives alone      PLAN:Reviewed orders, medications, records, and pertinent labs/x-rays from   hospital. Monitor VS, BS, O2, etc as per protocol. See written orders. PT/OT   will evaluate and start appropriate rehabilitation program. Reviewed and signed   off orders, medications,Labs, x-rays, and current diagnoses. Reviewed and   updated CPR status and any changes in Advanced directives. Continue Rehab Will   see 1-2 times weekly for next 30 days then reassess. Will always see at   resident, family , or nursing request. Discharge Planning   Time   Time Spent With Patient: 60 minutes

## 2025-05-15 NOTE — LETTER
Patient: Humera Villegas  : 1950    Encounter Date: 05/15/2025    PROGRESS NOTE    History Of Present Illness  Subjective  Patient ID: Humera Villegas is a 74 y.o. female who is acute skilled care being seen and evaluated for multiple medical problems.    HPI:  74 F h/o COPD, chronic hypoxic respiratory failure (3L NC) severe aortic stenosis, HTN, p/w SOB & back pain, CT TS T8 chronic compression deformity >50% ht loss no retropulsion, T10 burst fx > 50% ht loss, min retropulsion, admitted to [facility] for rehab.    Patient presented to ED on May 3 with shortness of breath and back pain.  She has been hospitalized and been treated for a COPD exacerbation. And initially treated for Pneumonia.   Her back pain never improved, CT thoracic spine obtained with findings above.  Patient denies any weakness, numbness, paresthesias, saddle anesthesia, bowel bladder incontinence  Patient is currently in bed but sitting up and ready for lunch.  She denies any pain or shortness of breath currently.  She was constipated for 2 days but had a bowel movement yesterday.  Patient walks with a walker.  She is a 1 assist for transfers.  Patient states she has pain when she is up walking or standing but no pain currently at rest..  Patient states her shortness of breath that she originally was taking in for was secondary to the amount of pain she was having in her back.  Patient is wearing a TLSO brace when up right, ambulating or active.      Past medical history: Acute hypoxic respiratory failure with hypercapnia.,  COPD, chronic hypoxic respiratory failure on 3 L of O2, chronic diastolic heart failure, hypertension, hyperlipidemia, pulmonary hypertension, aortic stenosis, allergic rhinitis, osteoporosis, pathological fracture with delayed healing and 1/10 thoracic vertebral unstable burst fracture.  Also GERD without esophagitis, anxiety, back pain, bursitis of the left shoulder, lumbar stenosis, protein calorie  malnutrition, sacral wound.  Chronic compression deformity of the thoracic spine with the more acute burst fracture.,  Shortness of breath    Medications: Prednisone, lidocaine patch, oxygen, Xanax as needed, amlodipine, DuoNebs, albuterol as needed, atorvastatin, Naprosyn, Tums as needed, bisacodyl as needed, tramadol as needed, Trelegy, methocarbamol as needed, omeprazole, B12    Allergies: Penicillin    Social history: Ex-smoker x 15 years, denies EtOH use     Allergies  Amoxicillin and Penicillin    Review of Systems  Patient denies chest pain, shortness of breath, nausea, vomiting, fever, chills, diarrhea, vision changes, rashes, dysuria, paresthesias, vertigo, headache, cough or cold symptoms, or any other complaints at this time. A complete review of systems was done, and is as stated in the history of present illness, is otherwise negative or not pertinent to the complaint.    Physical Exam  Physical exam: Vital signs and nurses notes were reviewed.  Blood pressure 121/76, temp 97.7, heart rate 79, SpO2 93% on oxygen    General:  no acute distress. Alert and oriented  x 4.     Head: atraumatic and normocephalic    Eyes: EOMs are intact, conjunctivae is not injected.    Oropharynx:  no trismus or drooling, buccal mucosa is moist.    Ears:  normal external exam, no swelling or erythema,     Nasal: normal external exam,     Neck: Supple, full range of motion,     Cardiac: Regular rate and rhythm. No murmurs noted.     Pulmonary: Lungs clear bilaterally with good aeration. No adventitious breath sounds. No wheezes rales or rhonchi. No accessory muscle use no retraction noted.    Abdomen: Soft,  Nontender. No guarding, rigidity, or distention. Normoactive bowel sounds. No pulsatile masses, no bruits.     Extremities: Patient moves all 4 extremities independently, no pitting edema    Skin: No rash seen. Skin is warm and dry     Neuro: Patient is alert and oriented x4. Speech is clear. There is no asymmetry with  facial grimaces, and no tongue deviation. Patient moves all extremities independently. Sensation is intact.  Patient is a 1 person assist for transfers and needs help with putting on her TLSO brace.  Uses walker for ambulation with the brace on     Assessment/Plan  Problem List Items Addressed This Visit           ICD-10-CM    Anxiety F41.9    Continue alprazolam as needed         Back pain M54.9    Lidocaine patch, methocarbamol, Tylenol, tramadol.  As needed for pain for burst fracture at T10.  Follow-up with neurosurgery with repeat x-rays in 6 weeks         Benign essential hypertension I10    Continue amlodipine as needed         COPD (chronic obstructive pulmonary disease) (Multi) J44.9    Oxygen by nasal cannula, Trelegy, albuterol, DuoNebs 3 times daily, and currently on a prednisone taper         Gastroesophageal reflux disease K21.9    Continue omeprazole         Elevated LDL cholesterol level E78.00    Continue atorvastatin         Acute on chronic hypoxic respiratory failure J96.21    Patient required BiPAP in the emergency room.  Currently on oxygen and 93% saturations.  Denies shortness of breath.  Patient has DuoNebs twice daily, albuterol as needed, Trelegy, currently on prednisone, continue oxygen         Physical deconditioning R53.81    Continue PT, OT, ST evaluation and treat         Closed unstable burst fracture of tenth thoracic vertebra with delayed healing - Primary S22.072G    Continue lidocaine patches, Robaxin, Tylenol, tramadol, Naprosyn etc. for pain.  She is to wear her TLSO brace with activity, ambulation, standing etc.  Follow-up with neurosurgery for repeat x-rays in 6 weeks as outpatient               Reviewed recent labs, vitals, progress notes and documents pertaining to this visit.    Monica Rodriguez PA-C    Electronically Signed By: Monica Rodriguez PA-C   5/15/25  2:09 PM

## 2025-05-15 NOTE — ASSESSMENT & PLAN NOTE
Lidocaine patch, methocarbamol, Tylenol, tramadol.  As needed for pain for burst fracture at T10.  Follow-up with neurosurgery with repeat x-rays in 6 weeks

## 2025-05-15 NOTE — ASSESSMENT & PLAN NOTE
Patient required BiPAP in the emergency room.  Currently on oxygen and 93% saturations.  Denies shortness of breath.  Patient has DuoNebs twice daily, albuterol as needed, Trelegy, currently on prednisone, continue oxygen

## 2025-05-15 NOTE — ASSESSMENT & PLAN NOTE
Continue lidocaine patches, Robaxin, Tylenol, tramadol, Naprosyn etc. for pain.  She is to wear her TLSO brace with activity, ambulation, standing etc.  Follow-up with neurosurgery for repeat x-rays in 6 weeks as outpatient

## 2025-05-16 ENCOUNTER — NURSING HOME VISIT (OUTPATIENT)
Dept: POST ACUTE CARE | Facility: EXTERNAL LOCATION | Age: 75
End: 2025-05-16
Payer: MEDICARE

## 2025-05-16 DIAGNOSIS — M80.00XG AGE-RELATED OSTEOPOROSIS WITH CURRENT PATHOLOGICAL FRACTURE WITH DELAYED HEALING, SUBSEQUENT ENCOUNTER: ICD-10-CM

## 2025-05-16 DIAGNOSIS — S22.072G: Primary | ICD-10-CM

## 2025-05-16 DIAGNOSIS — I50.32 CHRONIC DIASTOLIC (CONGESTIVE) HEART FAILURE: ICD-10-CM

## 2025-05-16 DIAGNOSIS — R53.81 PHYSICAL DECONDITIONING: ICD-10-CM

## 2025-05-16 DIAGNOSIS — K21.9 GASTROESOPHAGEAL REFLUX DISEASE WITHOUT ESOPHAGITIS: ICD-10-CM

## 2025-05-16 DIAGNOSIS — J96.21 ACUTE ON CHRONIC HYPOXIC RESPIRATORY FAILURE: ICD-10-CM

## 2025-05-16 DIAGNOSIS — I35.0 MODERATE AORTIC STENOSIS: ICD-10-CM

## 2025-05-16 DIAGNOSIS — J44.1 COPD EXACERBATION (MULTI): ICD-10-CM

## 2025-05-16 DIAGNOSIS — F41.1 GAD (GENERALIZED ANXIETY DISORDER): ICD-10-CM

## 2025-05-18 ASSESSMENT — ENCOUNTER SYMPTOMS
NAUSEA: 0
CARDIOVASCULAR NEGATIVE: 1
BACK PAIN: 1
CONSTIPATION: 1
SLEEP DISTURBANCE: 1
COUGH: 1
CHILLS: 0
VOMITING: 0
NERVOUS/ANXIOUS: 1
WHEEZING: 1
ACTIVITY CHANGE: 1
HEMATOLOGIC/LYMPHATIC NEGATIVE: 1
ENDOCRINE NEGATIVE: 1
SINUS PRESSURE: 1
ARTHRALGIAS: 1
ALLERGIC/IMMUNOLOGIC NEGATIVE: 1
FATIGUE: 1
MYALGIAS: 1
APPETITE CHANGE: 1
SHORTNESS OF BREATH: 1
FEVER: 0

## 2025-05-19 NOTE — PROGRESS NOTES
Subjective   Patient ID: Humera Villegas is a 74 y.o. female who presents for T 10 fracture       HPI  74 F h/o COPD, chronic hypoxic respiratory failure (3L NC) severe aortic stenosis, HTN, p/w SOB & back pain, CT TS T8 chronic compression deformity >50% ht loss no retropulsion, T10 burst fx > 50% ht loss, min retropulsion, admitted to [facility] for rehab.    Patient presented to ED on May 3 with shortness of breath and back pain.  She has been hospitalized and been treated for a COPD exacerbation. And initially treated for Pneumonia.   Her back pain never improved, CT thoracic spine obtained with findings above.  Patient denies any weakness, numbness, paresthesias, saddle anesthesia, bowel bladder incontinence  Today she states that she is SOB on ambulation.   Continues to have mderate to severe pain in the back  Using 3-4 Liters of O2. Desaturates often   Pain much better today Still wondering about shower.     Review of Systems   Constitutional:  Positive for activity change, appetite change and fatigue. Negative for chills and fever.   HENT:  Positive for sinus pressure.    Respiratory:  Positive for cough, shortness of breath and wheezing.    Cardiovascular: Negative.    Gastrointestinal:  Positive for constipation. Negative for nausea and vomiting.   Endocrine: Negative.    Genitourinary: Negative.    Musculoskeletal:  Positive for arthralgias, back pain, gait problem and myalgias.   Skin: Negative.  Negative for rash.   Allergic/Immunologic: Negative.    Hematological: Negative.    Psychiatric/Behavioral:  Positive for sleep disturbance. The patient is nervous/anxious.    All other systems reviewed and are negative.      Objective   There were no vitals taken for this visit.  BSA: There is no height or weight on file to calculate BSA.  Growth percentiles: Facility age limit for growth %melodie is 20 years. Facility age limit for growth %melodie is 20 years.   Lab Requisition on 05/15/2025   Component Date  Value Ref Range Status    Glucose 05/15/2025 199 (H)  74 - 99 mg/dL Final    Sodium 05/15/2025 137  136 - 145 mmol/L Final    Potassium 05/15/2025 4.5  3.5 - 5.3 mmol/L Final    Chloride 05/15/2025 99  98 - 107 mmol/L Final    Bicarbonate 05/15/2025 31  21 - 32 mmol/L Final    Anion Gap 05/15/2025 12  10 - 20 mmol/L Final    Urea Nitrogen 05/15/2025 35 (H)  6 - 23 mg/dL Final    Creatinine 05/15/2025 0.73  0.50 - 1.05 mg/dL Final    eGFR 05/15/2025 85  >60 mL/min/1.73m*2 Final    Calculations of estimated GFR are performed using the 2021 CKD-EPI Study Refit equation without the race variable for the IDMS-Traceable creatinine methods.  https://jasn.asnjournals.org/content/early/2021/09/22/ASN.6561475112    Calcium 05/15/2025 9.5  8.6 - 10.3 mg/dL Final    WBC 05/15/2025 11.2  4.4 - 11.3 x10*3/uL Final    nRBC 05/15/2025 0.0  0.0 - 0.0 /100 WBCs Final    RBC 05/15/2025 4.44  4.00 - 5.20 x10*6/uL Final    Hemoglobin 05/15/2025 14.3  12.0 - 16.0 g/dL Final    Hematocrit 05/15/2025 44.5  36.0 - 46.0 % Final    MCV 05/15/2025 100  80 - 100 fL Final    MCH 05/15/2025 32.2  26.0 - 34.0 pg Final    MCHC 05/15/2025 32.1  32.0 - 36.0 g/dL Final    RDW 05/15/2025 13.9  11.5 - 14.5 % Final    Platelets 05/15/2025 230  150 - 450 x10*3/uL Final    Neutrophils % 05/15/2025 93.2  40.0 - 80.0 % Final    Immature Granulocytes %, Automated 05/15/2025 0.7  0.0 - 0.9 % Final    Immature Granulocyte Count (IG) includes promyelocytes, myelocytes and metamyelocytes but does not include bands. Percent differential counts (%) should be interpreted in the context of the absolute cell counts (cells/UL).    Lymphocytes % 05/15/2025 3.9  13.0 - 44.0 % Final    Monocytes % 05/15/2025 2.1  2.0 - 10.0 % Final    Eosinophils % 05/15/2025 0.0  0.0 - 6.0 % Final    Basophils % 05/15/2025 0.1  0.0 - 2.0 % Final    Neutrophils Absolute 05/15/2025 10.42 (H)  1.60 - 5.50 x10*3/uL Final    Percent differential counts (%) should be interpreted in the context  of the absolute cell counts (cells/uL).    Immature Granulocytes Absolute, Au* 05/15/2025 0.08  0.00 - 0.50 x10*3/uL Final    Lymphocytes Absolute 05/15/2025 0.44 (L)  0.80 - 3.00 x10*3/uL Final    Monocytes Absolute 05/15/2025 0.23  0.05 - 0.80 x10*3/uL Final    Eosinophils Absolute 05/15/2025 0.00  0.00 - 0.40 x10*3/uL Final    Basophils Absolute 05/15/2025 0.01  0.00 - 0.10 x10*3/uL Final      Physical Exam  Constitutional:       General: She is not in acute distress.     Appearance: She is ill-appearing. She is not toxic-appearing.   HENT:      Head: Normocephalic.      Nose: Rhinorrhea present.   Eyes:      Pupils: Pupils are equal, round, and reactive to light.   Cardiovascular:      Rate and Rhythm: Normal rate and regular rhythm.      Heart sounds: Murmur heard.   Pulmonary:      Breath sounds: No wheezing.      Comments: Very diminished air entry  Abdominal:      Palpations: Abdomen is soft.   Musculoskeletal:         General: Tenderness present.   Neurological:      General: No focal deficit present.         Assessment/Plan   Problem List Items Addressed This Visit       Gastroesophageal reflux disease    Osteoporosis    MOMO (generalized anxiety disorder)    COPD exacerbation (Multi)    Moderate aortic stenosis    Acute on chronic hypoxic respiratory failure    Physical deconditioning    Chronic diastolic (congestive) heart failure    Closed unstable burst fracture of tenth thoracic vertebra with delayed healing - Primary         74 F h/o COPD, chronic hypoxic respiratory failure (3L NC) severe aortic stenosis, HTN, p/w SOB & back pain, CT TS T8 chronic compression deformity >50% ht loss no retropulsion, T10 burst fx > 50% ht loss, min retropulsion,       T10 burst fracture   - Would maintain hard TLSO brace when patient is upright, when they are ambulating, and when they are active.  Can remove brace for changing clothes and bathing  -  physical therapy and Occupational Therapy, patient needs to be in  brace   - Pain control Lidocaine Patch Robaxin Tylenol TRamadol  - Will arrange for follow-up with repeat x-rays in 6 weeks with outpatient neurosurgery     Severe COPD (GOLD E) with acute exacerbation: . Managed with Trelegy 100, and albuterol HFA and DuoNebs PRN. Presented with worsening shortness of breath and cough. Has increased oxygen need at present. Was on BiPap in ER   Baseline O2 3l-6l at home  On 5/10 began prednisone taper 40mgx3 days, 30mgx3 days, 20mgx3 days, 10mg x3 days (Start on 5/9)  Continue DuoNebs TID.    Moderate to severe Aortic stenosis /HTN/HLD c/w Amlodipine and Atorvastatin    GERD/Esophageal dysmotility  c/w Omeprazole    Physical Deconditioning OT/PT . Lives alone      PLAN:Reviewed orders, medications, records, and pertinent labs/x-rays from   hospital. Monitor VS, BS, O2, etc as per protocol. See written orders. PT/OT   will evaluate and start appropriate rehabilitation program. Reviewed and signed   off orders, medications,Labs, x-rays, and current diagnoses. Reviewed and   updated CPR status and any changes in Advanced directives. Continue Rehab Will   see 1-2 times weekly for next 30 days then reassess. Will always see at   resident, family , or nursing request. Discharge Planning   Time   Time Spent With Patient: 60 minutes

## 2025-05-20 ENCOUNTER — NURSING HOME VISIT (OUTPATIENT)
Dept: POST ACUTE CARE | Facility: EXTERNAL LOCATION | Age: 75
End: 2025-05-20
Payer: MEDICARE

## 2025-05-20 DIAGNOSIS — E78.00 ELEVATED LDL CHOLESTEROL LEVEL: ICD-10-CM

## 2025-05-20 DIAGNOSIS — F41.9 ANXIETY: ICD-10-CM

## 2025-05-20 DIAGNOSIS — M54.6 THORACIC BACK PAIN, UNSPECIFIED BACK PAIN LATERALITY, UNSPECIFIED CHRONICITY: ICD-10-CM

## 2025-05-20 DIAGNOSIS — J96.21 ACUTE ON CHRONIC HYPOXIC RESPIRATORY FAILURE: ICD-10-CM

## 2025-05-20 DIAGNOSIS — R53.81 PHYSICAL DECONDITIONING: ICD-10-CM

## 2025-05-20 DIAGNOSIS — I10 BENIGN ESSENTIAL HYPERTENSION: ICD-10-CM

## 2025-05-20 DIAGNOSIS — S22.072G: Primary | ICD-10-CM

## 2025-05-20 DIAGNOSIS — J43.9 PULMONARY EMPHYSEMA, UNSPECIFIED EMPHYSEMA TYPE (MULTI): ICD-10-CM

## 2025-05-20 DIAGNOSIS — K21.9 GASTROESOPHAGEAL REFLUX DISEASE WITHOUT ESOPHAGITIS: ICD-10-CM

## 2025-05-20 NOTE — ASSESSMENT & PLAN NOTE
Continue PT and OT, continue lidocaine patches, Robaxin, Tylenol, tramadol, Naprosyn etc. for pain. She is to wear her TLSO brace with activity, ambulation, standing etc. Follow-up with neurosurgery for repeat x-rays in 6 weeks as outpatient

## 2025-05-20 NOTE — LETTER
Patient: Humera Villegas  : 1950    Encounter Date: 2025    PROGRESS NOTE 25    History Of Present Illness  Subjective  Patient ID: Humera Villegas is a 74 y.o. female who is acute skilled care being seen and evaluated for multiple medical problems.    HPI:  74 F h/o COPD, chronic hypoxic respiratory failure (3L NC) severe aortic stenosis, HTN, p/w SOB & back pain, CT TS T8 chronic compression deformity >50% ht loss no retropulsion, T10 burst fx > 50% ht loss, min retropulsion, admitted to [facility] for rehab.    Patient presented to ED on May 3 with shortness of breath and back pain.  She has been hospitalized and been treated for a COPD exacerbation. And initially treated for Pneumonia.   Her back pain never improved, CT thoracic spine obtained with findings above.  Patient denies any weakness, numbness, paresthesias, saddle anesthesia, bowel bladder incontinence  Patient walks with a walker.  She is a 1 assist for transfers.  Patient states she has pain when she is up walking or standing but no pain currently at rest..   Patient is wearing a TLSO brace when up right, ambulating or active.    Currently in bed, with oxygen on.  Patient denies pain at rest.  She has chronic dyspnea on exertion that is unchanged.  Patient states her legs still feel weak.  No other complaints.     Past medical history: Acute hypoxic respiratory failure with hypercapnia.,  COPD, chronic hypoxic respiratory failure on 3 L of O2, chronic diastolic heart failure, hypertension, hyperlipidemia, pulmonary hypertension, aortic stenosis, allergic rhinitis, osteoporosis, pathological fracture with delayed healing and 1/10 thoracic vertebral unstable burst fracture.  Also GERD without esophagitis, anxiety, back pain, bursitis of the left shoulder, lumbar stenosis, protein calorie malnutrition, sacral wound.  Chronic compression deformity of the thoracic spine with the more acute burst fracture.,  Shortness of breath      Medications: Prednisone, lidocaine patch, oxygen, Xanax as needed, amlodipine, DuoNebs, albuterol as needed, atorvastatin, Naprosyn, Tums as needed, bisacodyl as needed, tramadol as needed, Trelegy, methocarbamol as needed, omeprazole, B12     Allergies: Penicillin     Social history: Ex-smoker x 15 years, denies EtOH use     Allergies  Amoxicillin and Penicillin     Review of Systems  Patient denies chest pain,  nausea, vomiting, fever, chills, diarrhea, vision changes, rashes, dysuria, paresthesias, vertigo, headache, cough or cold symptoms, or any other complaints at this time. A complete review of systems was done, and is as stated in the history of present illness, is otherwise negative or not pertinent to the complaint.     Physical Exam  Physical exam: Vital signs and nurses notes were reviewed.  Blood pressure on May 19 109/59, heart rate 77, SpO2 94% on O2     General:  no acute distress. Alert and oriented  x 4.      Head: atraumatic and normocephalic     Eyes: EOMs are intact, conjunctivae is not injected.     Oropharynx:  no trismus or drooling, buccal mucosa is moist.     Ears:  normal external exam, no swelling or erythema,      Nasal: normal external exam,      Neck: Supple, full range of motion,      Cardiac: Regular rate and rhythm. No murmurs noted.      Pulmonary: Lungs clear bilaterally with good aeration. No adventitious breath sounds. No wheezes rales or rhonchi. No accessory muscle use no retraction noted.     Abdomen: Soft,  Nontender. No guarding, rigidity, or distention. Normoactive bowel sounds. No pulsatile masses, no bruits.      Extremities: Patient moves all 4 extremities independently, no pitting edema     Skin: No rash seen. Skin is warm and dry      Neuro: Patient is alert and oriented x4. Speech is clear. There is no asymmetry with facial grimaces, and no tongue deviation. Patient moves all extremities independently. Sensation is intact.  Patient is a 1 person assist for  transfers and needs help with putting on her TLSO brace.  Uses walker for ambulation with the brace on     Assessment/Plan  Problem List Items Addressed This Visit           ICD-10-CM    Anxiety F41.9    Xanax as needed         Back pain M54.9    Lidocaine patch, methocarbamol, Tylenol, tramadol. As needed for pain for burst fracture at T10. Follow-up with neurosurgery with repeat x-rays in 6 weeks          Benign essential hypertension I10    Continue amlodipine         COPD (chronic obstructive pulmonary disease) (Multi) J44.9    Continue with oxygen, DuoNebs, albuterol as needed, Trelegy          Gastroesophageal reflux disease K21.9    Continue Tums and omeprazole         Elevated LDL cholesterol level E78.00    Continue atorvastatin         Acute on chronic hypoxic respiratory failure J96.21    Patient required BiPAP in the emergency room.  Currently on oxygen and 94% saturations.  Denies shortness of breath at rest but has chronic dyspnea on exertion.  Patient has DuoNebs twice daily, albuterol as needed, Trelegy, currently on prednisone, continue oxygen          Physical deconditioning R53.81    Continue PT and OT         Closed unstable burst fracture of tenth thoracic vertebra with delayed healing - Primary S22.072G    Continue PT and OT, continue lidocaine patches, Robaxin, Tylenol, tramadol, Naprosyn etc. for pain. She is to wear her TLSO brace with activity, ambulation, standing etc. Follow-up with neurosurgery for repeat x-rays in 6 weeks as outpatient                Reviewed recent labs, vitals, progress notes and documents pertaining to this visit.    Monica Rodriguez PA-C    Electronically Signed By: Monica Rodriguez PA-C   5/20/25  7:43 PM

## 2025-05-20 NOTE — ASSESSMENT & PLAN NOTE
Patient required BiPAP in the emergency room.  Currently on oxygen and 94% saturations.  Denies shortness of breath at rest but has chronic dyspnea on exertion.  Patient has DuoNebs twice daily, albuterol as needed, Trelegy, currently on prednisone, continue oxygen

## 2025-05-20 NOTE — PROGRESS NOTES
PROGRESS NOTE 5/19/25    History Of Present Illness  Subjective   Patient ID: Humera Villegas is a 74 y.o. female who is acute skilled care being seen and evaluated for multiple medical problems.    HPI:  74 F h/o COPD, chronic hypoxic respiratory failure (3L NC) severe aortic stenosis, HTN, p/w SOB & back pain, CT TS T8 chronic compression deformity >50% ht loss no retropulsion, T10 burst fx > 50% ht loss, min retropulsion, admitted to [facility] for rehab.    Patient presented to ED on May 3 with shortness of breath and back pain.  She has been hospitalized and been treated for a COPD exacerbation. And initially treated for Pneumonia.   Her back pain never improved, CT thoracic spine obtained with findings above.  Patient denies any weakness, numbness, paresthesias, saddle anesthesia, bowel bladder incontinence  Patient walks with a walker.  She is a 1 assist for transfers.  Patient states she has pain when she is up walking or standing but no pain currently at rest..   Patient is wearing a TLSO brace when up right, ambulating or active.    Currently in bed, with oxygen on.  Patient denies pain at rest.  She has chronic dyspnea on exertion that is unchanged.  Patient states her legs still feel weak.  No other complaints.     Past medical history: Acute hypoxic respiratory failure with hypercapnia.,  COPD, chronic hypoxic respiratory failure on 3 L of O2, chronic diastolic heart failure, hypertension, hyperlipidemia, pulmonary hypertension, aortic stenosis, allergic rhinitis, osteoporosis, pathological fracture with delayed healing and 1/10 thoracic vertebral unstable burst fracture.  Also GERD without esophagitis, anxiety, back pain, bursitis of the left shoulder, lumbar stenosis, protein calorie malnutrition, sacral wound.  Chronic compression deformity of the thoracic spine with the more acute burst fracture.,  Shortness of breath     Medications: Prednisone, lidocaine patch, oxygen, Xanax as needed,  amlodipine, DuoNebs, albuterol as needed, atorvastatin, Naprosyn, Tums as needed, bisacodyl as needed, tramadol as needed, Trelegy, methocarbamol as needed, omeprazole, B12     Allergies: Penicillin     Social history: Ex-smoker x 15 years, denies EtOH use     Allergies  Amoxicillin and Penicillin     Review of Systems  Patient denies chest pain,  nausea, vomiting, fever, chills, diarrhea, vision changes, rashes, dysuria, paresthesias, vertigo, headache, cough or cold symptoms, or any other complaints at this time. A complete review of systems was done, and is as stated in the history of present illness, is otherwise negative or not pertinent to the complaint.     Physical Exam  Physical exam: Vital signs and nurses notes were reviewed.  Blood pressure on May 19 109/59, heart rate 77, SpO2 94% on O2     General:  no acute distress. Alert and oriented  x 4.      Head: atraumatic and normocephalic     Eyes: EOMs are intact, conjunctivae is not injected.     Oropharynx:  no trismus or drooling, buccal mucosa is moist.     Ears:  normal external exam, no swelling or erythema,      Nasal: normal external exam,      Neck: Supple, full range of motion,      Cardiac: Regular rate and rhythm. No murmurs noted.      Pulmonary: Lungs clear bilaterally with good aeration. No adventitious breath sounds. No wheezes rales or rhonchi. No accessory muscle use no retraction noted.     Abdomen: Soft,  Nontender. No guarding, rigidity, or distention. Normoactive bowel sounds. No pulsatile masses, no bruits.      Extremities: Patient moves all 4 extremities independently, no pitting edema     Skin: No rash seen. Skin is warm and dry      Neuro: Patient is alert and oriented x4. Speech is clear. There is no asymmetry with facial grimaces, and no tongue deviation. Patient moves all extremities independently. Sensation is intact.  Patient is a 1 person assist for transfers and needs help with putting on her TLSO brace.  Uses walker for  ambulation with the brace on     Assessment/Plan   Problem List Items Addressed This Visit           ICD-10-CM    Anxiety F41.9    Xanax as needed         Back pain M54.9    Lidocaine patch, methocarbamol, Tylenol, tramadol. As needed for pain for burst fracture at T10. Follow-up with neurosurgery with repeat x-rays in 6 weeks          Benign essential hypertension I10    Continue amlodipine         COPD (chronic obstructive pulmonary disease) (Multi) J44.9    Continue with oxygen, DuoNebs, albuterol as needed, Trelegy          Gastroesophageal reflux disease K21.9    Continue Tums and omeprazole         Elevated LDL cholesterol level E78.00    Continue atorvastatin         Acute on chronic hypoxic respiratory failure J96.21    Patient required BiPAP in the emergency room.  Currently on oxygen and 94% saturations.  Denies shortness of breath at rest but has chronic dyspnea on exertion.  Patient has DuoNebs twice daily, albuterol as needed, Trelegy, currently on prednisone, continue oxygen          Physical deconditioning R53.81    Continue PT and OT         Closed unstable burst fracture of tenth thoracic vertebra with delayed healing - Primary S22.072G    Continue PT and OT, continue lidocaine patches, Robaxin, Tylenol, tramadol, Naprosyn etc. for pain. She is to wear her TLSO brace with activity, ambulation, standing etc. Follow-up with neurosurgery for repeat x-rays in 6 weeks as outpatient                Reviewed recent labs, vitals, progress notes and documents pertaining to this visit.    Monica Rodriguez PA-C

## 2025-05-21 ENCOUNTER — NURSING HOME VISIT (OUTPATIENT)
Dept: POST ACUTE CARE | Facility: EXTERNAL LOCATION | Age: 75
End: 2025-05-21
Payer: MEDICARE

## 2025-05-21 DIAGNOSIS — M80.00XG AGE-RELATED OSTEOPOROSIS WITH CURRENT PATHOLOGICAL FRACTURE WITH DELAYED HEALING, SUBSEQUENT ENCOUNTER: ICD-10-CM

## 2025-05-21 DIAGNOSIS — I10 BENIGN ESSENTIAL HYPERTENSION: ICD-10-CM

## 2025-05-21 DIAGNOSIS — J43.9 PULMONARY EMPHYSEMA, UNSPECIFIED EMPHYSEMA TYPE (MULTI): ICD-10-CM

## 2025-05-21 DIAGNOSIS — I35.0 MODERATE AORTIC STENOSIS: ICD-10-CM

## 2025-05-21 DIAGNOSIS — J96.21 ACUTE ON CHRONIC HYPOXIC RESPIRATORY FAILURE: ICD-10-CM

## 2025-05-21 DIAGNOSIS — S22.072G: Primary | ICD-10-CM

## 2025-05-21 DIAGNOSIS — I27.20 PULMONARY HTN (MULTI): ICD-10-CM

## 2025-05-21 DIAGNOSIS — F41.9 ANXIETY: ICD-10-CM

## 2025-05-21 DIAGNOSIS — K21.9 GASTROESOPHAGEAL REFLUX DISEASE WITHOUT ESOPHAGITIS: ICD-10-CM

## 2025-05-21 DIAGNOSIS — J96.01 ACUTE RESPIRATORY FAILURE WITH HYPOXIA AND HYPERCAPNIA: ICD-10-CM

## 2025-05-21 DIAGNOSIS — R53.81 PHYSICAL DECONDITIONING: ICD-10-CM

## 2025-05-21 DIAGNOSIS — J96.02 ACUTE RESPIRATORY FAILURE WITH HYPOXIA AND HYPERCAPNIA: ICD-10-CM

## 2025-05-21 DIAGNOSIS — I50.32 CHRONIC DIASTOLIC (CONGESTIVE) HEART FAILURE: ICD-10-CM

## 2025-05-21 NOTE — LETTER
Patient: Humera Villegas  : 1950    Encounter Date: 2025    Subjective  Patient ID: Humera Villegas is a 74 y.o. female who presents for T 10 fracture       HPI  74 F h/o COPD, chronic hypoxic respiratory failure (3L NC) severe aortic stenosis, HTN, p/w SOB & back pain, CT TS T8 chronic compression deformity >50% ht loss no retropulsion, T10 burst fx > 50% ht loss, min retropulsion, admitted to [facility] for rehab.    Patient presented to ED on May 3 with shortness of breath and back pain.  She has been hospitalized and been treated for a COPD exacerbation. And initially treated for Pneumonia.   Her back pain never improved, CT thoracic spine obtained with findings above.  Patient denies any weakness, numbness, paresthesias, saddle anesthesia, bowel bladder incontinence  Today she states that she is SOB on ambulation.   Continues to have mderate to severe pain in the back  Using 3-4 Liters of O2. Desaturates often   Pain much better today Still wondering about shower.     Review of Systems   Constitutional:  Positive for activity change, appetite change and fatigue. Negative for chills and fever.   HENT:  Positive for sinus pressure.    Respiratory:  Positive for cough, shortness of breath and wheezing.    Cardiovascular: Negative.    Gastrointestinal:  Positive for constipation. Negative for nausea and vomiting.   Endocrine: Negative.    Genitourinary: Negative.    Musculoskeletal:  Positive for arthralgias, back pain, gait problem and myalgias.   Skin: Negative.  Negative for rash.   Allergic/Immunologic: Negative.    Hematological: Negative.    Psychiatric/Behavioral:  Positive for sleep disturbance. The patient is nervous/anxious.    All other systems reviewed and are negative.      Objective  There were no vitals taken for this visit.  BSA: There is no height or weight on file to calculate BSA.  Growth percentiles: Facility age limit for growth %melodie is 20 years. Facility age limit for growth  %melodie is 20 years.   Lab Requisition on 05/15/2025   Component Date Value Ref Range Status   • Glucose 05/15/2025 199 (H)  74 - 99 mg/dL Final   • Sodium 05/15/2025 137  136 - 145 mmol/L Final   • Potassium 05/15/2025 4.5  3.5 - 5.3 mmol/L Final   • Chloride 05/15/2025 99  98 - 107 mmol/L Final   • Bicarbonate 05/15/2025 31  21 - 32 mmol/L Final   • Anion Gap 05/15/2025 12  10 - 20 mmol/L Final   • Urea Nitrogen 05/15/2025 35 (H)  6 - 23 mg/dL Final   • Creatinine 05/15/2025 0.73  0.50 - 1.05 mg/dL Final   • eGFR 05/15/2025 85  >60 mL/min/1.73m*2 Final    Calculations of estimated GFR are performed using the 2021 CKD-EPI Study Refit equation without the race variable for the IDMS-Traceable creatinine methods.  https://jasn.asnjournals.org/content/early/2021/09/22/ASN.7496011576   • Calcium 05/15/2025 9.5  8.6 - 10.3 mg/dL Final   • WBC 05/15/2025 11.2  4.4 - 11.3 x10*3/uL Final   • nRBC 05/15/2025 0.0  0.0 - 0.0 /100 WBCs Final   • RBC 05/15/2025 4.44  4.00 - 5.20 x10*6/uL Final   • Hemoglobin 05/15/2025 14.3  12.0 - 16.0 g/dL Final   • Hematocrit 05/15/2025 44.5  36.0 - 46.0 % Final   • MCV 05/15/2025 100  80 - 100 fL Final   • MCH 05/15/2025 32.2  26.0 - 34.0 pg Final   • MCHC 05/15/2025 32.1  32.0 - 36.0 g/dL Final   • RDW 05/15/2025 13.9  11.5 - 14.5 % Final   • Platelets 05/15/2025 230  150 - 450 x10*3/uL Final   • Neutrophils % 05/15/2025 93.2  40.0 - 80.0 % Final   • Immature Granulocytes %, Automated 05/15/2025 0.7  0.0 - 0.9 % Final    Immature Granulocyte Count (IG) includes promyelocytes, myelocytes and metamyelocytes but does not include bands. Percent differential counts (%) should be interpreted in the context of the absolute cell counts (cells/UL).   • Lymphocytes % 05/15/2025 3.9  13.0 - 44.0 % Final   • Monocytes % 05/15/2025 2.1  2.0 - 10.0 % Final   • Eosinophils % 05/15/2025 0.0  0.0 - 6.0 % Final   • Basophils % 05/15/2025 0.1  0.0 - 2.0 % Final   • Neutrophils Absolute 05/15/2025 10.42 (H)   1.60 - 5.50 x10*3/uL Final    Percent differential counts (%) should be interpreted in the context of the absolute cell counts (cells/uL).   • Immature Granulocytes Absolute, Au* 05/15/2025 0.08  0.00 - 0.50 x10*3/uL Final   • Lymphocytes Absolute 05/15/2025 0.44 (L)  0.80 - 3.00 x10*3/uL Final   • Monocytes Absolute 05/15/2025 0.23  0.05 - 0.80 x10*3/uL Final   • Eosinophils Absolute 05/15/2025 0.00  0.00 - 0.40 x10*3/uL Final   • Basophils Absolute 05/15/2025 0.01  0.00 - 0.10 x10*3/uL Final      Physical Exam  Constitutional:       General: She is not in acute distress.     Appearance: She is ill-appearing. She is not toxic-appearing.   HENT:      Head: Normocephalic.      Nose: Rhinorrhea present.   Eyes:      Pupils: Pupils are equal, round, and reactive to light.   Cardiovascular:      Rate and Rhythm: Normal rate and regular rhythm.      Heart sounds: Murmur heard.   Pulmonary:      Breath sounds: No wheezing.      Comments: Very diminished air entry  Abdominal:      Palpations: Abdomen is soft.   Musculoskeletal:         General: Tenderness present.   Neurological:      General: No focal deficit present.         Assessment/Plan  Problem List Items Addressed This Visit       Anxiety    Benign essential hypertension    COPD (chronic obstructive pulmonary disease) (Multi)    Gastroesophageal reflux disease    Osteoporosis    Moderate aortic stenosis    Acute on chronic hypoxic respiratory failure    Physical deconditioning    Pulmonary HTN (Multi)    Acute respiratory failure with hypoxia and hypercapnia    Chronic diastolic (congestive) heart failure    Closed unstable burst fracture of tenth thoracic vertebra with delayed healing - Primary           74 F h/o COPD, chronic hypoxic respiratory failure (3L NC) severe aortic stenosis, HTN, p/w SOB & back pain, CT TS T8 chronic compression deformity >50% ht loss no retropulsion, T10 burst fx > 50% ht loss, min retropulsion,       T10 burst fracture   - Would  maintain hard TLSO brace when patient is upright, when they are ambulating, and when they are active.  Can remove brace for changing clothes and bathing  -  physical therapy and Occupational Therapy, patient needs to be in brace   - Pain control Lidocaine Patch Robaxin Tylenol TRamadol  - Will arrange for follow-up with repeat x-rays in 6 weeks with outpatient neurosurgery     Severe COPD (GOLD E) with acute exacerbation: . Managed with Trelegy 100, and albuterol HFA and DuoNebs PRN. Presented with worsening shortness of breath and cough. Has increased oxygen need at present. Was on BiPap in ER   Baseline O2 3l-6l at home  On 5/10 began prednisone taper 40mgx3 days, 30mgx3 days, 20mgx3 days, 10mg x3 days (Start on 5/9)  Continue DuoNebs TID.    Moderate to severe Aortic stenosis /HTN/HLD c/w Amlodipine and Atorvastatin    GERD/Esophageal dysmotility  c/w Omeprazole    Physical Deconditioning OT/PT . Lives alone      PLAN:Reviewed orders, medications, records, and pertinent labs/x-rays from   hospital. Monitor VS, BS, O2, etc as per protocol. See written orders. PT/OT   will evaluate and start appropriate rehabilitation program. Reviewed and signed   off orders, medications,Labs, x-rays, and current diagnoses. Reviewed and   updated CPR status and any changes in Advanced directives. Continue Rehab Will   see 1-2 times weekly for next 30 days then reassess. Will always see at   resident, family , or nursing request. Discharge Planning   Time   Time Spent With Patient: 60 minutes            Electronically Signed By: Fabio Chou MD   5/26/25  2:39 PM

## 2025-05-22 ENCOUNTER — NURSING HOME VISIT (OUTPATIENT)
Dept: POST ACUTE CARE | Facility: EXTERNAL LOCATION | Age: 75
End: 2025-05-22
Payer: MEDICARE

## 2025-05-22 DIAGNOSIS — R53.81 PHYSICAL DECONDITIONING: ICD-10-CM

## 2025-05-22 DIAGNOSIS — K21.9 GASTROESOPHAGEAL REFLUX DISEASE WITHOUT ESOPHAGITIS: ICD-10-CM

## 2025-05-22 DIAGNOSIS — S22.072G: Primary | ICD-10-CM

## 2025-05-22 DIAGNOSIS — M54.6 THORACIC BACK PAIN, UNSPECIFIED BACK PAIN LATERALITY, UNSPECIFIED CHRONICITY: ICD-10-CM

## 2025-05-22 DIAGNOSIS — F41.9 ANXIETY: ICD-10-CM

## 2025-05-22 DIAGNOSIS — J96.21 ACUTE ON CHRONIC HYPOXIC RESPIRATORY FAILURE: ICD-10-CM

## 2025-05-22 DIAGNOSIS — J43.9 PULMONARY EMPHYSEMA, UNSPECIFIED EMPHYSEMA TYPE (MULTI): ICD-10-CM

## 2025-05-22 DIAGNOSIS — E78.00 ELEVATED LDL CHOLESTEROL LEVEL: ICD-10-CM

## 2025-05-22 DIAGNOSIS — I10 BENIGN ESSENTIAL HYPERTENSION: ICD-10-CM

## 2025-05-22 NOTE — ASSESSMENT & PLAN NOTE
Oxygen 24/7, has chronic dyspnea on exertion, DuoNebs twice daily, albuterol as needed, patient on prednisone

## 2025-05-22 NOTE — PROGRESS NOTES
PROGRESS NOTE    History Of Present Illness  Subjective   Patient ID: Humera Villegas is a 74 y.o. female who is acute skilled care being seen and evaluated for multiple medical problems.    HPI:  74 F h/o COPD, chronic hypoxic respiratory failure (3L NC) severe aortic stenosis, HTN, p/w SOB & back pain, CT TS T8 chronic compression deformity >50% ht loss no retropulsion, T10 burst fx > 50% ht loss, min retropulsion, admitted to [facility] for rehab.    Patient presented to ED on May 3 with shortness of breath and back pain.  She has been hospitalized and been treated for a COPD exacerbation. And initially treated for Pneumonia.   Her back pain never improved, CT thoracic spine obtained with findings above.  Patient denies any weakness, numbness, paresthesias, saddle anesthesia, bowel bladder incontinence  Patient walks with a walker.  She is a 1 assist for transfers.       Today the patient was sitting in her wheelchair with her brace on.  She was wearing her oxygen.  She felt a little tired because she had just come from  and was walking with a walker and physical therapy.  Patient has no back pain if the brace is on correctly.  She does have some pain if it is on too long she gets a burning in her back but she has no pain currently.  She has chronic dyspnea on exertion that is unchanged.  She is eating and having good bowel movements and has no complaints at this time.     Past medical history: Acute hypoxic respiratory failure with hypercapnia.,  COPD, chronic hypoxic respiratory failure on 3 L of O2, chronic diastolic heart failure, hypertension, hyperlipidemia, pulmonary hypertension, aortic stenosis, allergic rhinitis, osteoporosis, pathological fracture with delayed healing and 1/10 thoracic vertebral unstable burst fracture.  Also GERD without esophagitis, anxiety, back pain, bursitis of the left shoulder, lumbar stenosis, protein calorie malnutrition, sacral wound.  Chronic compression deformity  of the thoracic spine with the more acute burst fracture.,  Shortness of breath     Medications: Prednisone, lidocaine patch, oxygen, Xanax as needed, amlodipine, DuoNebs, albuterol as needed, atorvastatin, Naprosyn, Tums as needed, bisacodyl as needed, tramadol as needed, Trelegy, methocarbamol as needed, omeprazole, B12     Allergies: Penicillin     Social history: Ex-smoker x 15 years, denies EtOH use     Allergies  Amoxicillin and Penicillin     Review of Systems  Patient denies chest pain,  nausea, vomiting, fever, chills, diarrhea, vision changes, rashes, dysuria, paresthesias, vertigo, headache, cough or cold symptoms, or any other complaints at this time. A complete review of systems was done, and is as stated in the history of present illness, is otherwise negative or not pertinent to the complaint.     Physical Exam  Physical exam: Vital signs and nurses notes were reviewed.  Blood pressure on May 19 109/59, heart rate 77, SpO2 94% on O2      General:  no acute distress. Alert and oriented  x 4.      Head: atraumatic and normocephalic     Eyes: EOMs are intact, conjunctivae is not injected.     Oropharynx:  no trismus or drooling, buccal mucosa is moist.     Ears:  normal external exam, no swelling or erythema,      Nasal: normal external exam,      Neck: Supple, full range of motion,      Cardiac: Regular rate and rhythm. No murmurs noted.      Pulmonary: Lungs clear bilaterally with good aeration. No adventitious breath sounds. No wheezes rales or rhonchi. No accessory muscle use no retraction noted.     Abdomen: Soft,  Nontender. No guarding, rigidity, or distention. Normoactive bowel sounds. No pulsatile masses, no bruits.      Extremities: Patient moves all 4 extremities independently, no pitting edema.  Patient does have some mild ankle edema when wearing socks that cause indentations.  We will order some compression hose to see if that helps with her swelling.     Skin: No rash seen. Skin is warm  and dry      Neuro: Patient is alert and oriented x4. Speech is clear. There is no asymmetry with facial grimaces, and no tongue deviation. Patient moves all extremities independently. Sensation is intact.  Patient is a 1 person assist for transfers and needs help with putting on her TLSO brace.  Uses walker for ambulation with the brace on     Assessment/Plan  Anxiety  Continue Xanax as needed    Back pain  Continue lidocaine patches, methocarbamol, Tylenol and tramadol as needed.  Patient has a burst fracture at T10 and is to follow-up with neurosurgery.    Benign essential hypertension  Continue amlodipine    COPD (chronic obstructive pulmonary disease) (Multi)  Continue O2, DuoNebs, albuterol as needed, Trelegy Ellipta    Gastroesophageal reflux disease  Continue Tums and omeprazole    Elevated LDL cholesterol level  Continue atorvastatin    Acute on chronic hypoxic respiratory failure (Multi)  Oxygen 24/7, has chronic dyspnea on exertion, DuoNebs twice daily, albuterol as needed, patient on prednisone    Physical deconditioning  Continue PT and OT    Closed unstable burst fracture of tenth thoracic vertebra with delayed healing  Continue PT and OT, continue lidocaine patches, Robaxin, Tylenol, tramadol, Naprosyn etc. for pain. She is to wear her TLSO brace with activity, ambulation, standing etc. Follow-up with neurosurgery for repeat x-rays in 6 weeks as outpatient          Reviewed recent labs, vitals, progress notes and documents pertaining to this visit.     Monica Rodriguez PA-C

## 2025-05-22 NOTE — LETTER
Patient: Humera Villegas  : 1950    Encounter Date: 2025    PROGRESS NOTE    History Of Present Illness  Subjective  Patient ID: Humera Villegas is a 74 y.o. female who is acute skilled care being seen and evaluated for multiple medical problems.    HPI:  74 F h/o COPD, chronic hypoxic respiratory failure (3L NC) severe aortic stenosis, HTN, p/w SOB & back pain, CT TS T8 chronic compression deformity >50% ht loss no retropulsion, T10 burst fx > 50% ht loss, min retropulsion, admitted to [facility] for rehab.    Patient presented to ED on May 3 with shortness of breath and back pain.  She has been hospitalized and been treated for a COPD exacerbation. And initially treated for Pneumonia.   Her back pain never improved, CT thoracic spine obtained with findings above.  Patient denies any weakness, numbness, paresthesias, saddle anesthesia, bowel bladder incontinence  Patient walks with a walker.  She is a 1 assist for transfers.       Today the patient was sitting in her wheelchair with her brace on.  She was wearing her oxygen.  She felt a little tired because she had just come from PT and was walking with a walker and physical therapy.  Patient has no back pain if the brace is on correctly.  She does have some pain if it is on too long she gets a burning in her back but she has no pain currently.  She has chronic dyspnea on exertion that is unchanged.  She is eating and having good bowel movements and has no complaints at this time.     Past medical history: Acute hypoxic respiratory failure with hypercapnia.,  COPD, chronic hypoxic respiratory failure on 3 L of O2, chronic diastolic heart failure, hypertension, hyperlipidemia, pulmonary hypertension, aortic stenosis, allergic rhinitis, osteoporosis, pathological fracture with delayed healing and 1/10 thoracic vertebral unstable burst fracture.  Also GERD without esophagitis, anxiety, back pain, bursitis of the left shoulder, lumbar stenosis,  protein calorie malnutrition, sacral wound.  Chronic compression deformity of the thoracic spine with the more acute burst fracture.,  Shortness of breath     Medications: Prednisone, lidocaine patch, oxygen, Xanax as needed, amlodipine, DuoNebs, albuterol as needed, atorvastatin, Naprosyn, Tums as needed, bisacodyl as needed, tramadol as needed, Trelegy, methocarbamol as needed, omeprazole, B12     Allergies: Penicillin     Social history: Ex-smoker x 15 years, denies EtOH use     Allergies  Amoxicillin and Penicillin     Review of Systems  Patient denies chest pain,  nausea, vomiting, fever, chills, diarrhea, vision changes, rashes, dysuria, paresthesias, vertigo, headache, cough or cold symptoms, or any other complaints at this time. A complete review of systems was done, and is as stated in the history of present illness, is otherwise negative or not pertinent to the complaint.     Physical Exam  Physical exam: Vital signs and nurses notes were reviewed.  Blood pressure on May 19 109/59, heart rate 77, SpO2 94% on O2      General:  no acute distress. Alert and oriented  x 4.      Head: atraumatic and normocephalic     Eyes: EOMs are intact, conjunctivae is not injected.     Oropharynx:  no trismus or drooling, buccal mucosa is moist.     Ears:  normal external exam, no swelling or erythema,      Nasal: normal external exam,      Neck: Supple, full range of motion,      Cardiac: Regular rate and rhythm. No murmurs noted.      Pulmonary: Lungs clear bilaterally with good aeration. No adventitious breath sounds. No wheezes rales or rhonchi. No accessory muscle use no retraction noted.     Abdomen: Soft,  Nontender. No guarding, rigidity, or distention. Normoactive bowel sounds. No pulsatile masses, no bruits.      Extremities: Patient moves all 4 extremities independently, no pitting edema.  Patient does have some mild ankle edema when wearing socks that cause indentations.  We will order some compression hose to  see if that helps with her swelling.     Skin: No rash seen. Skin is warm and dry      Neuro: Patient is alert and oriented x4. Speech is clear. There is no asymmetry with facial grimaces, and no tongue deviation. Patient moves all extremities independently. Sensation is intact.  Patient is a 1 person assist for transfers and needs help with putting on her TLSO brace.  Uses walker for ambulation with the brace on     Assessment/Plan  Anxiety  Continue Xanax as needed    Back pain  Continue lidocaine patches, methocarbamol, Tylenol and tramadol as needed.  Patient has a burst fracture at T10 and is to follow-up with neurosurgery.    Benign essential hypertension  Continue amlodipine    COPD (chronic obstructive pulmonary disease) (Multi)  Continue O2, DuoNebs, albuterol as needed, Trelegy Ellipta    Gastroesophageal reflux disease  Continue Tums and omeprazole    Elevated LDL cholesterol level  Continue atorvastatin    Acute on chronic hypoxic respiratory failure (Multi)  Oxygen 24/7, has chronic dyspnea on exertion, DuoNebs twice daily, albuterol as needed, patient on prednisone    Physical deconditioning  Continue PT and OT    Closed unstable burst fracture of tenth thoracic vertebra with delayed healing  Continue PT and OT, continue lidocaine patches, Robaxin, Tylenol, tramadol, Naprosyn etc. for pain. She is to wear her TLSO brace with activity, ambulation, standing etc. Follow-up with neurosurgery for repeat x-rays in 6 weeks as outpatient          Reviewed recent labs, vitals, progress notes and documents pertaining to this visit.     Monica Rodriguez PA-C         Electronically Signed By: Monica Rodriguez PA-C   5/22/25  3:28 PM

## 2025-05-22 NOTE — ASSESSMENT & PLAN NOTE
Continue lidocaine patches, methocarbamol, Tylenol and tramadol as needed.  Patient has a burst fracture at T10 and is to follow-up with neurosurgery.

## 2025-05-23 ENCOUNTER — NURSING HOME VISIT (OUTPATIENT)
Dept: POST ACUTE CARE | Facility: EXTERNAL LOCATION | Age: 75
End: 2025-05-23
Payer: MEDICARE

## 2025-05-23 DIAGNOSIS — J96.21 ACUTE ON CHRONIC HYPOXIC RESPIRATORY FAILURE: ICD-10-CM

## 2025-05-23 DIAGNOSIS — K21.00 GASTROESOPHAGEAL REFLUX DISEASE WITH ESOPHAGITIS, UNSPECIFIED WHETHER HEMORRHAGE: ICD-10-CM

## 2025-05-23 DIAGNOSIS — I27.20 PULMONARY HTN (MULTI): ICD-10-CM

## 2025-05-23 DIAGNOSIS — I10 BENIGN ESSENTIAL HYPERTENSION: ICD-10-CM

## 2025-05-23 DIAGNOSIS — R53.81 PHYSICAL DECONDITIONING: ICD-10-CM

## 2025-05-23 DIAGNOSIS — M80.00XG AGE-RELATED OSTEOPOROSIS WITH CURRENT PATHOLOGICAL FRACTURE WITH DELAYED HEALING, SUBSEQUENT ENCOUNTER: ICD-10-CM

## 2025-05-23 DIAGNOSIS — J43.9 PULMONARY EMPHYSEMA, UNSPECIFIED EMPHYSEMA TYPE (MULTI): ICD-10-CM

## 2025-05-23 DIAGNOSIS — E78.00 PURE HYPERCHOLESTEROLEMIA: ICD-10-CM

## 2025-05-23 DIAGNOSIS — F41.9 ANXIETY: Primary | ICD-10-CM

## 2025-05-23 DIAGNOSIS — M54.6 THORACIC BACK PAIN, UNSPECIFIED BACK PAIN LATERALITY, UNSPECIFIED CHRONICITY: ICD-10-CM

## 2025-05-23 DIAGNOSIS — I35.0 MODERATE AORTIC STENOSIS: ICD-10-CM

## 2025-05-23 DIAGNOSIS — I50.32 CHRONIC DIASTOLIC (CONGESTIVE) HEART FAILURE: ICD-10-CM

## 2025-05-23 DIAGNOSIS — S22.072G: ICD-10-CM

## 2025-05-23 NOTE — LETTER
Patient: Humera Villegas  : 1950    Encounter Date: 2025    Discharge visit   Subjective  Patient ID: Humera Villegas is a 74 y.o. female who presents for T 10 fracture       HPI  74 F h/o COPD, chronic hypoxic respiratory failure (3L NC) severe aortic stenosis, HTN, p/w SOB & back pain, CT TS T8 chronic compression deformity >50% ht loss no retropulsion, T10 burst fx > 50% ht loss, min retropulsion, admitted to [facility] for rehab.    Patient presented to ED on May 3 with shortness of breath and back pain.  She has been hospitalized and been treated for a COPD exacerbation. And initially treated for Pneumonia.   Her back pain never improved, CT thoracic spine obtained with findings above.  Patient denies any weakness, numbness, paresthesias, saddle anesthesia, bowel bladder incontinence  Today she states that she is SOB on ambulation.   Continues to have mderate to severe pain in the back  Using 3-4 Liters of O2. Desaturates often   Pain much better today Still wondering about shower.     Review of Systems   Constitutional:  Positive for activity change, appetite change and fatigue. Negative for chills and fever.   HENT:  Positive for sinus pressure.    Respiratory:  Positive for cough, shortness of breath and wheezing.    Cardiovascular: Negative.    Gastrointestinal:  Positive for constipation. Negative for nausea and vomiting.   Endocrine: Negative.    Genitourinary: Negative.    Musculoskeletal:  Positive for arthralgias, back pain, gait problem and myalgias.   Skin: Negative.  Negative for rash.   Allergic/Immunologic: Negative.    Hematological: Negative.    Psychiatric/Behavioral:  Positive for sleep disturbance. The patient is nervous/anxious.    All other systems reviewed and are negative.      Objective  There were no vitals taken for this visit.  BSA: There is no height or weight on file to calculate BSA.  Growth percentiles: Facility age limit for growth %melodie is 20 years. Facility  age limit for growth %melodie is 20 years.   No visits with results within 1 Week(s) from this visit.   Latest known visit with results is:   Lab Requisition on 05/15/2025   Component Date Value Ref Range Status   • Glucose 05/15/2025 199 (H)  74 - 99 mg/dL Final   • Sodium 05/15/2025 137  136 - 145 mmol/L Final   • Potassium 05/15/2025 4.5  3.5 - 5.3 mmol/L Final   • Chloride 05/15/2025 99  98 - 107 mmol/L Final   • Bicarbonate 05/15/2025 31  21 - 32 mmol/L Final   • Anion Gap 05/15/2025 12  10 - 20 mmol/L Final   • Urea Nitrogen 05/15/2025 35 (H)  6 - 23 mg/dL Final   • Creatinine 05/15/2025 0.73  0.50 - 1.05 mg/dL Final   • eGFR 05/15/2025 85  >60 mL/min/1.73m*2 Final    Calculations of estimated GFR are performed using the 2021 CKD-EPI Study Refit equation without the race variable for the IDMS-Traceable creatinine methods.  https://jasn.asnjournals.org/content/early/2021/09/22/ASN.1121466074   • Calcium 05/15/2025 9.5  8.6 - 10.3 mg/dL Final   • WBC 05/15/2025 11.2  4.4 - 11.3 x10*3/uL Final   • nRBC 05/15/2025 0.0  0.0 - 0.0 /100 WBCs Final   • RBC 05/15/2025 4.44  4.00 - 5.20 x10*6/uL Final   • Hemoglobin 05/15/2025 14.3  12.0 - 16.0 g/dL Final   • Hematocrit 05/15/2025 44.5  36.0 - 46.0 % Final   • MCV 05/15/2025 100  80 - 100 fL Final   • MCH 05/15/2025 32.2  26.0 - 34.0 pg Final   • MCHC 05/15/2025 32.1  32.0 - 36.0 g/dL Final   • RDW 05/15/2025 13.9  11.5 - 14.5 % Final   • Platelets 05/15/2025 230  150 - 450 x10*3/uL Final   • Neutrophils % 05/15/2025 93.2  40.0 - 80.0 % Final   • Immature Granulocytes %, Automated 05/15/2025 0.7  0.0 - 0.9 % Final    Immature Granulocyte Count (IG) includes promyelocytes, myelocytes and metamyelocytes but does not include bands. Percent differential counts (%) should be interpreted in the context of the absolute cell counts (cells/UL).   • Lymphocytes % 05/15/2025 3.9  13.0 - 44.0 % Final   • Monocytes % 05/15/2025 2.1  2.0 - 10.0 % Final   • Eosinophils % 05/15/2025 0.0   0.0 - 6.0 % Final   • Basophils % 05/15/2025 0.1  0.0 - 2.0 % Final   • Neutrophils Absolute 05/15/2025 10.42 (H)  1.60 - 5.50 x10*3/uL Final    Percent differential counts (%) should be interpreted in the context of the absolute cell counts (cells/uL).   • Immature Granulocytes Absolute, Au* 05/15/2025 0.08  0.00 - 0.50 x10*3/uL Final   • Lymphocytes Absolute 05/15/2025 0.44 (L)  0.80 - 3.00 x10*3/uL Final   • Monocytes Absolute 05/15/2025 0.23  0.05 - 0.80 x10*3/uL Final   • Eosinophils Absolute 05/15/2025 0.00  0.00 - 0.40 x10*3/uL Final   • Basophils Absolute 05/15/2025 0.01  0.00 - 0.10 x10*3/uL Final      Physical Exam  Constitutional:       General: She is not in acute distress.     Appearance: She is ill-appearing. She is not toxic-appearing.   HENT:      Head: Normocephalic.      Nose: Rhinorrhea present.   Eyes:      Pupils: Pupils are equal, round, and reactive to light.   Cardiovascular:      Rate and Rhythm: Normal rate and regular rhythm.      Heart sounds: Murmur heard.   Pulmonary:      Breath sounds: No wheezing.      Comments: Very diminished air entry  Abdominal:      Palpations: Abdomen is soft.   Musculoskeletal:         General: Tenderness present.   Neurological:      General: No focal deficit present.         Assessment/Plan  Problem List Items Addressed This Visit       Anxiety - Primary    Relevant Medications    methocarbamol (Robaxin) 750 mg tablet    naproxen (Naprosyn) 500 mg tablet    ALPRAZolam (Xanax) 0.5 mg tablet    Back pain    Benign essential hypertension    COPD (chronic obstructive pulmonary disease) (Multi)    Gastroesophageal reflux disease    Relevant Medications    methocarbamol (Robaxin) 750 mg tablet    naproxen (Naprosyn) 500 mg tablet    ALPRAZolam (Xanax) 0.5 mg tablet    omeprazole (PriLOSEC) 20 mg DR capsule    Osteoporosis    Moderate aortic stenosis    Acute on chronic hypoxic respiratory failure    Physical deconditioning    Pulmonary HTN (Multi)     Hyperlipidemia    Chronic diastolic (congestive) heart failure    Closed unstable burst fracture of tenth thoracic vertebra with delayed healing    Relevant Medications    methocarbamol (Robaxin) 750 mg tablet    naproxen (Naprosyn) 500 mg tablet    ALPRAZolam (Xanax) 0.5 mg tablet           74 F h/o COPD, chronic hypoxic respiratory failure (3L NC) severe aortic stenosis, HTN, p/w SOB & back pain, CT TS T8 chronic compression deformity >50% ht loss no retropulsion, T10 burst fx > 50% ht loss, min retropulsion,       T10 burst fracture   - Would maintain hard TLSO brace when patient is upright, when they are ambulating, and when they are active.  Can remove brace for changing clothes and bathing  -  physical therapy and Occupational Therapy, patient needs to be in brace   - Pain control Lidocaine Patch Robaxin Tylenol TRamadol  - Will arrange for follow-up with repeat x-rays in 6 weeks with outpatient neurosurgery     Severe COPD (GOLD E) with acute exacerbation: . Managed with Trelegy 100, and albuterol HFA and DuoNebs PRN. Presented with worsening shortness of breath and cough. Has increased oxygen need at present. Was on BiPap in ER   Baseline O2 3l-6l at home  On 5/10 began prednisone taper 40mgx3 days, 30mgx3 days, 20mgx3 days, 10mg x3 days (Start on 5/9)  Continue DuoNebs TID.    Moderate to severe Aortic stenosis /HTN/HLD c/w Amlodipine and Atorvastatin    GERD/Esophageal dysmotility  c/w Omeprazole    Physical Deconditioning OT/PT . Lives alone       Assessment: first home care visit: 1-2 days after discharge.   Status upon Discharge: Stable for discharge and improved from admission.   Discharge to home in stable and improved condition.  Completed rehab program and was discharged. Had functional improvement when compared to admission and is felt medically and physically stable to be discharged from our rehab facility. Please see individual recommendations from the respective therapy disciplines for activity  and/or restrictions. Medically stable for discharge at this time.    Instructions given, Rx's escribed, meds reviewed, appropriate follow-up, and due to diagnosis listed in discharge patient is considered home bound. Referred for HHC, as arranged per , for RN, PT, and OT, if indicated. See discharge instructions. Family aware and notified of DC information. Recommend contacting PCP for appt in 7-10 days for follow-up on conditions treated in hospital and in rehab facility.   Referred for HHC as arranged per .   Face to Face Certification: face to face encounter completed   Medical Necessity for Home care: Short term nursing follow up is needed to monitor for signs and symptoms of   decompensations/adverse events. Rehab services to improve function and establish home exercise program.   Skilled Disciplines ordered: RN/PT/OT/SP as indicated.   Home care skilled service: assessment, rehab (PT/OT/SP eval and treat).   Homebound status: homebound.   Homebound status due to: diagnosis listed above.               Electronically Signed By: Faboi Chou MD   5/26/25  4:46 PM

## 2025-05-24 RX ORDER — ALPRAZOLAM 0.5 MG/1
0.5 TABLET ORAL 3 TIMES DAILY PRN
Qty: 21 TABLET | Refills: 0 | Status: SHIPPED | OUTPATIENT
Start: 2025-05-24 | End: 2025-05-31

## 2025-05-24 RX ORDER — NAPROXEN 500 MG/1
500 TABLET ORAL 2 TIMES DAILY PRN
Qty: 60 TABLET | Refills: 0 | Status: SHIPPED | OUTPATIENT
Start: 2025-05-24 | End: 2025-08-22

## 2025-05-24 RX ORDER — OMEPRAZOLE 20 MG/1
20 CAPSULE, DELAYED RELEASE ORAL DAILY
Qty: 90 CAPSULE | Refills: 3 | Status: SHIPPED | OUTPATIENT
Start: 2025-05-24

## 2025-05-24 RX ORDER — METHOCARBAMOL 750 MG/1
750 TABLET, FILM COATED ORAL 3 TIMES DAILY
Qty: 90 TABLET | Refills: 1 | Status: SHIPPED | OUTPATIENT
Start: 2025-05-24 | End: 2025-07-23

## 2025-05-24 ASSESSMENT — ENCOUNTER SYMPTOMS
ARTHRALGIAS: 1
SLEEP DISTURBANCE: 1
COUGH: 1
FATIGUE: 1
CONSTIPATION: 1
SHORTNESS OF BREATH: 1
APPETITE CHANGE: 1
FEVER: 0
NERVOUS/ANXIOUS: 1
CHILLS: 0
VOMITING: 0
SINUS PRESSURE: 1
NAUSEA: 0
ALLERGIC/IMMUNOLOGIC NEGATIVE: 1
ACTIVITY CHANGE: 1
BACK PAIN: 1
WHEEZING: 1
MYALGIAS: 1
HEMATOLOGIC/LYMPHATIC NEGATIVE: 1
ENDOCRINE NEGATIVE: 1
CARDIOVASCULAR NEGATIVE: 1

## 2025-05-24 NOTE — PROGRESS NOTES
Discharge visit   Subjective   Patient ID: Humera Villegas is a 74 y.o. female who presents for T 10 fracture       HPI  74 F h/o COPD, chronic hypoxic respiratory failure (3L NC) severe aortic stenosis, HTN, p/w SOB & back pain, CT TS T8 chronic compression deformity >50% ht loss no retropulsion, T10 burst fx > 50% ht loss, min retropulsion, admitted to [facility] for rehab.    Patient presented to ED on May 3 with shortness of breath and back pain.  She has been hospitalized and been treated for a COPD exacerbation. And initially treated for Pneumonia.   Her back pain never improved, CT thoracic spine obtained with findings above.  Patient denies any weakness, numbness, paresthesias, saddle anesthesia, bowel bladder incontinence  Today she states that she is SOB on ambulation.   Continues to have mderate to severe pain in the back  Using 3-4 Liters of O2. Desaturates often   Pain much better today   IS being discharged to [facility] bed as patient unable to breath laying flat .     Review of Systems   Constitutional:  Positive for activity change, appetite change and fatigue. Negative for chills and fever.   HENT:  Positive for sinus pressure.    Respiratory:  Positive for cough, shortness of breath and wheezing.    Cardiovascular: Negative.    Gastrointestinal:  Positive for constipation. Negative for nausea and vomiting.   Endocrine: Negative.    Genitourinary: Negative.    Musculoskeletal:  Positive for arthralgias, back pain, gait problem and myalgias.   Skin: Negative.  Negative for rash.   Allergic/Immunologic: Negative.    Hematological: Negative.    Psychiatric/Behavioral:  Positive for sleep disturbance. The patient is nervous/anxious.    All other systems reviewed and are negative.      Objective   There were no vitals taken for this visit.  BSA: There is no height or weight on file to calculate BSA.  Growth percentiles: Facility age limit for growth %melodie is 20 years. Facility age  limit for growth %melodie is 20 years.   No visits with results within 1 Week(s) from this visit.   Latest known visit with results is:   Lab Requisition on 05/15/2025   Component Date Value Ref Range Status    Glucose 05/15/2025 199 (H)  74 - 99 mg/dL Final    Sodium 05/15/2025 137  136 - 145 mmol/L Final    Potassium 05/15/2025 4.5  3.5 - 5.3 mmol/L Final    Chloride 05/15/2025 99  98 - 107 mmol/L Final    Bicarbonate 05/15/2025 31  21 - 32 mmol/L Final    Anion Gap 05/15/2025 12  10 - 20 mmol/L Final    Urea Nitrogen 05/15/2025 35 (H)  6 - 23 mg/dL Final    Creatinine 05/15/2025 0.73  0.50 - 1.05 mg/dL Final    eGFR 05/15/2025 85  >60 mL/min/1.73m*2 Final    Calculations of estimated GFR are performed using the 2021 CKD-EPI Study Refit equation without the race variable for the IDMS-Traceable creatinine methods.  https://jasn.asnjournals.org/content/early/2021/09/22/ASN.2348425834    Calcium 05/15/2025 9.5  8.6 - 10.3 mg/dL Final    WBC 05/15/2025 11.2  4.4 - 11.3 x10*3/uL Final    nRBC 05/15/2025 0.0  0.0 - 0.0 /100 WBCs Final    RBC 05/15/2025 4.44  4.00 - 5.20 x10*6/uL Final    Hemoglobin 05/15/2025 14.3  12.0 - 16.0 g/dL Final    Hematocrit 05/15/2025 44.5  36.0 - 46.0 % Final    MCV 05/15/2025 100  80 - 100 fL Final    MCH 05/15/2025 32.2  26.0 - 34.0 pg Final    MCHC 05/15/2025 32.1  32.0 - 36.0 g/dL Final    RDW 05/15/2025 13.9  11.5 - 14.5 % Final    Platelets 05/15/2025 230  150 - 450 x10*3/uL Final    Neutrophils % 05/15/2025 93.2  40.0 - 80.0 % Final    Immature Granulocytes %, Automated 05/15/2025 0.7  0.0 - 0.9 % Final    Immature Granulocyte Count (IG) includes promyelocytes, myelocytes and metamyelocytes but does not include bands. Percent differential counts (%) should be interpreted in the context of the absolute cell counts (cells/UL).    Lymphocytes % 05/15/2025 3.9  13.0 - 44.0 % Final    Monocytes % 05/15/2025 2.1  2.0 - 10.0 % Final    Eosinophils % 05/15/2025 0.0  0.0 - 6.0 % Final     Basophils % 05/15/2025 0.1  0.0 - 2.0 % Final    Neutrophils Absolute 05/15/2025 10.42 (H)  1.60 - 5.50 x10*3/uL Final    Percent differential counts (%) should be interpreted in the context of the absolute cell counts (cells/uL).    Immature Granulocytes Absolute, Au* 05/15/2025 0.08  0.00 - 0.50 x10*3/uL Final    Lymphocytes Absolute 05/15/2025 0.44 (L)  0.80 - 3.00 x10*3/uL Final    Monocytes Absolute 05/15/2025 0.23  0.05 - 0.80 x10*3/uL Final    Eosinophils Absolute 05/15/2025 0.00  0.00 - 0.40 x10*3/uL Final    Basophils Absolute 05/15/2025 0.01  0.00 - 0.10 x10*3/uL Final      Physical Exam  Constitutional:       General: She is not in acute distress.     Appearance: She is ill-appearing. She is not toxic-appearing.   HENT:      Head: Normocephalic.      Nose: Rhinorrhea present.   Eyes:      Pupils: Pupils are equal, round, and reactive to light.   Cardiovascular:      Rate and Rhythm: Normal rate and regular rhythm.      Heart sounds: Murmur heard.   Pulmonary:      Breath sounds: No wheezing.      Comments: Very diminished air entry  Abdominal:      Palpations: Abdomen is soft.   Musculoskeletal:         General: Tenderness present.   Neurological:      General: No focal deficit present.         Assessment/Plan   Problem List Items Addressed This Visit       Anxiety - Primary    Relevant Medications    methocarbamol (Robaxin) 750 mg tablet    naproxen (Naprosyn) 500 mg tablet    ALPRAZolam (Xanax) 0.5 mg tablet    Back pain    Benign essential hypertension    COPD (chronic obstructive pulmonary disease) (Multi)    Gastroesophageal reflux disease    Relevant Medications    methocarbamol (Robaxin) 750 mg tablet    naproxen (Naprosyn) 500 mg tablet    ALPRAZolam (Xanax) 0.5 mg tablet    omeprazole (PriLOSEC) 20 mg DR capsule    Osteoporosis    Moderate aortic stenosis    Acute on chronic hypoxic respiratory failure    Physical deconditioning    Pulmonary HTN (Multi)    Hyperlipidemia    Chronic diastolic  (congestive) heart failure    Closed unstable burst fracture of tenth thoracic vertebra with delayed healing    Relevant Medications    methocarbamol (Robaxin) 750 mg tablet    naproxen (Naprosyn) 500 mg tablet    ALPRAZolam (Xanax) 0.5 mg tablet           74 F h/o COPD, chronic hypoxic respiratory failure (3L NC) severe aortic stenosis, HTN, p/w SOB & back pain, CT TS T8 chronic compression deformity >50% ht loss no retropulsion, T10 burst fx > 50% ht loss, min retropulsion,       T10 burst fracture   - Would maintain hard TLSO brace when patient is upright, when they are ambulating, and when they are active.  Can remove brace for changing clothes and bathing  -  physical therapy and Occupational Therapy, patient needs to be in brace   - Pain control Lidocaine Patch Robaxin Tylenol TRamadol  - Will arrange for follow-up with repeat x-rays in 6 weeks with outpatient neurosurgery     Severe COPD (GOLD E) with acute exacerbation: . Managed with Trelegy 100, and albuterol HFA and DuoNebs PRN. Presented with worsening shortness of breath and cough. Has increased oxygen need at present. Was on BiPap in ER   Baseline O2 3l-6l at home  On 5/10 began prednisone taper 40mgx3 days, 30mgx3 days, 20mgx3 days, 10mg x3 days (Start on 5/9)  Continue DuoNebs TID.    Moderate to severe Aortic stenosis /HTN/HLD c/w Amlodipine and Atorvastatin    GERD/Esophageal dysmotility  c/w Omeprazole    Physical Deconditioning OT/PT . [facility]      Patient  has a medical condition which requires positioning of the body in ways not feasible with an ordinary bed.  in order to alleviate pain  also requires the head of the bed to be elevated more than 30 degrees most of the time due to congestive heart failure, chronic pulmonary disease,       Assessment: first home care visit: 1-2 days after discharge.   Status upon Discharge: Stable for discharge and improved from admission.   Discharge to home in stable and improved condition.  Completed rehab program  and was discharged. Had functional improvement when compared to admission and is felt medically and physically stable to be discharged from our rehab facility. Please see individual recommendations from the respective therapy disciplines for activity and/or restrictions. Medically stable for discharge at this time.    Instructions given, Rx's escribed, meds reviewed, appropriate follow-up, and due to diagnosis listed in discharge patient is considered home bound. Referred for HHC, as arranged per , for RN, PT, and OT, if indicated. See discharge instructions. Family aware and notified of DC information. Recommend contacting PCP for appt in 7-10 days for follow-up on conditions treated in hospital and in rehab facility.   Referred for HHC as arranged per .   Face to Face Certification: face to face encounter completed   Medical Necessity for Home care: Short term nursing follow up is needed to monitor for signs and symptoms of   decompensations/adverse events. Rehab services to improve function and establish home exercise program.   Skilled Disciplines ordered: RN/PT/OT/SP as indicated.   Home care skilled service: assessment, rehab (PT/OT/SP eval and treat).   Homebound status: homebound.   Homebound status due to: diagnosis listed above.

## 2025-05-26 ASSESSMENT — ENCOUNTER SYMPTOMS
APPETITE CHANGE: 1
VOMITING: 0
FATIGUE: 1
ENDOCRINE NEGATIVE: 1
SINUS PRESSURE: 1
ACTIVITY CHANGE: 1
CONSTIPATION: 1
NAUSEA: 0
CARDIOVASCULAR NEGATIVE: 1
SHORTNESS OF BREATH: 1
HEMATOLOGIC/LYMPHATIC NEGATIVE: 1
CHILLS: 0
ALLERGIC/IMMUNOLOGIC NEGATIVE: 1
BACK PAIN: 1
WHEEZING: 1
MYALGIAS: 1
FEVER: 0
NERVOUS/ANXIOUS: 1
SLEEP DISTURBANCE: 1
ARTHRALGIAS: 1
COUGH: 1

## 2025-05-26 NOTE — PROGRESS NOTES
Subjective   Patient ID: Humera Villegas is a 74 y.o. female who presents for T 10 fracture       HPI  74 F h/o COPD, chronic hypoxic respiratory failure (3L NC) severe aortic stenosis, HTN, p/w SOB & back pain, CT TS T8 chronic compression deformity >50% ht loss no retropulsion, T10 burst fx > 50% ht loss, min retropulsion, admitted to [facility] for rehab.    Patient presented to ED on May 3 with shortness of breath and back pain.  She has been hospitalized and been treated for a COPD exacerbation. And initially treated for Pneumonia.   Her back pain never improved, CT thoracic spine obtained with findings above.  Patient denies any weakness, numbness, paresthesias, saddle anesthesia, bowel bladder incontinence  Today she states that she is SOB on ambulation.   Continues to have mderate to severe pain in the back  Using 3-4 Liters of O2. Desaturates often   Pain much better today Still wondering about shower.     Review of Systems   Constitutional:  Positive for activity change, appetite change and fatigue. Negative for chills and fever.   HENT:  Positive for sinus pressure.    Respiratory:  Positive for cough, shortness of breath and wheezing.    Cardiovascular: Negative.    Gastrointestinal:  Positive for constipation. Negative for nausea and vomiting.   Endocrine: Negative.    Genitourinary: Negative.    Musculoskeletal:  Positive for arthralgias, back pain, gait problem and myalgias.   Skin: Negative.  Negative for rash.   Allergic/Immunologic: Negative.    Hematological: Negative.    Psychiatric/Behavioral:  Positive for sleep disturbance. The patient is nervous/anxious.    All other systems reviewed and are negative.      Objective   There were no vitals taken for this visit.  BSA: There is no height or weight on file to calculate BSA.  Growth percentiles: Facility age limit for growth %melodie is 20 years. Facility age limit for growth %melodie is 20 years.   Lab Requisition on 05/15/2025   Component Date  Value Ref Range Status    Glucose 05/15/2025 199 (H)  74 - 99 mg/dL Final    Sodium 05/15/2025 137  136 - 145 mmol/L Final    Potassium 05/15/2025 4.5  3.5 - 5.3 mmol/L Final    Chloride 05/15/2025 99  98 - 107 mmol/L Final    Bicarbonate 05/15/2025 31  21 - 32 mmol/L Final    Anion Gap 05/15/2025 12  10 - 20 mmol/L Final    Urea Nitrogen 05/15/2025 35 (H)  6 - 23 mg/dL Final    Creatinine 05/15/2025 0.73  0.50 - 1.05 mg/dL Final    eGFR 05/15/2025 85  >60 mL/min/1.73m*2 Final    Calculations of estimated GFR are performed using the 2021 CKD-EPI Study Refit equation without the race variable for the IDMS-Traceable creatinine methods.  https://jasn.asnjournals.org/content/early/2021/09/22/ASN.1168698528    Calcium 05/15/2025 9.5  8.6 - 10.3 mg/dL Final    WBC 05/15/2025 11.2  4.4 - 11.3 x10*3/uL Final    nRBC 05/15/2025 0.0  0.0 - 0.0 /100 WBCs Final    RBC 05/15/2025 4.44  4.00 - 5.20 x10*6/uL Final    Hemoglobin 05/15/2025 14.3  12.0 - 16.0 g/dL Final    Hematocrit 05/15/2025 44.5  36.0 - 46.0 % Final    MCV 05/15/2025 100  80 - 100 fL Final    MCH 05/15/2025 32.2  26.0 - 34.0 pg Final    MCHC 05/15/2025 32.1  32.0 - 36.0 g/dL Final    RDW 05/15/2025 13.9  11.5 - 14.5 % Final    Platelets 05/15/2025 230  150 - 450 x10*3/uL Final    Neutrophils % 05/15/2025 93.2  40.0 - 80.0 % Final    Immature Granulocytes %, Automated 05/15/2025 0.7  0.0 - 0.9 % Final    Immature Granulocyte Count (IG) includes promyelocytes, myelocytes and metamyelocytes but does not include bands. Percent differential counts (%) should be interpreted in the context of the absolute cell counts (cells/UL).    Lymphocytes % 05/15/2025 3.9  13.0 - 44.0 % Final    Monocytes % 05/15/2025 2.1  2.0 - 10.0 % Final    Eosinophils % 05/15/2025 0.0  0.0 - 6.0 % Final    Basophils % 05/15/2025 0.1  0.0 - 2.0 % Final    Neutrophils Absolute 05/15/2025 10.42 (H)  1.60 - 5.50 x10*3/uL Final    Percent differential counts (%) should be interpreted in the context  of the absolute cell counts (cells/uL).    Immature Granulocytes Absolute, Au* 05/15/2025 0.08  0.00 - 0.50 x10*3/uL Final    Lymphocytes Absolute 05/15/2025 0.44 (L)  0.80 - 3.00 x10*3/uL Final    Monocytes Absolute 05/15/2025 0.23  0.05 - 0.80 x10*3/uL Final    Eosinophils Absolute 05/15/2025 0.00  0.00 - 0.40 x10*3/uL Final    Basophils Absolute 05/15/2025 0.01  0.00 - 0.10 x10*3/uL Final      Physical Exam  Constitutional:       General: She is not in acute distress.     Appearance: She is ill-appearing. She is not toxic-appearing.   HENT:      Head: Normocephalic.      Nose: Rhinorrhea present.   Eyes:      Pupils: Pupils are equal, round, and reactive to light.   Cardiovascular:      Rate and Rhythm: Normal rate and regular rhythm.      Heart sounds: Murmur heard.   Pulmonary:      Breath sounds: No wheezing.      Comments: Very diminished air entry  Abdominal:      Palpations: Abdomen is soft.   Musculoskeletal:         General: Tenderness present.   Neurological:      General: No focal deficit present.         Assessment/Plan   Problem List Items Addressed This Visit       Anxiety    Benign essential hypertension    COPD (chronic obstructive pulmonary disease) (Multi)    Gastroesophageal reflux disease    Osteoporosis    Moderate aortic stenosis    Acute on chronic hypoxic respiratory failure    Physical deconditioning    Pulmonary HTN (Multi)    Acute respiratory failure with hypoxia and hypercapnia    Chronic diastolic (congestive) heart failure    Closed unstable burst fracture of tenth thoracic vertebra with delayed healing - Primary           74 F h/o COPD, chronic hypoxic respiratory failure (3L NC) severe aortic stenosis, HTN, p/w SOB & back pain, CT TS T8 chronic compression deformity >50% ht loss no retropulsion, T10 burst fx > 50% ht loss, min retropulsion,       T10 burst fracture   - Would maintain hard TLSO brace when patient is upright, when they are ambulating, and when they are active.  Can  remove brace for changing clothes and bathing  -  physical therapy and Occupational Therapy, patient needs to be in brace   - Pain control Lidocaine Patch Robaxin Tylenol TRamadol  - Will arrange for follow-up with repeat x-rays in 6 weeks with outpatient neurosurgery     Severe COPD (GOLD E) with acute exacerbation: . Managed with Trelegy 100, and albuterol HFA and DuoNebs PRN. Presented with worsening shortness of breath and cough. Has increased oxygen need at present. Was on BiPap in ER   Baseline O2 3l-6l at home  On 5/10 began prednisone taper 40mgx3 days, 30mgx3 days, 20mgx3 days, 10mg x3 days (Start on 5/9)  Continue DuoNebs TID.    Moderate to severe Aortic stenosis /HTN/HLD c/w Amlodipine and Atorvastatin    GERD/Esophageal dysmotility  c/w Omeprazole    Physical Deconditioning OT/PT . Lives alone      PLAN:Reviewed orders, medications, records, and pertinent labs/x-rays from   hospital. Monitor VS, BS, O2, etc as per protocol. See written orders. PT/OT   will evaluate and start appropriate rehabilitation program. Reviewed and signed   off orders, medications,Labs, x-rays, and current diagnoses. Reviewed and   updated CPR status and any changes in Advanced directives. Continue Rehab Will   see 1-2 times weekly for next 30 days then reassess. Will always see at   resident, family , or nursing request. Discharge Planning   Time   Time Spent With Patient: 60 minutes

## 2025-05-27 ENCOUNTER — TELEPHONE (OUTPATIENT)
Dept: PRIMARY CARE | Facility: CLINIC | Age: 75
End: 2025-05-27
Payer: MEDICARE

## 2025-05-27 NOTE — TELEPHONE ENCOUNTER
Pt calling stating she's being transferred over to [facility] and would like a hospital bed because of back

## 2025-05-28 ENCOUNTER — TELEPHONE (OUTPATIENT)
Dept: PRIMARY CARE | Facility: CLINIC | Age: 75
End: 2025-05-28
Payer: MEDICARE

## 2025-06-02 ENCOUNTER — TELEPHONE (OUTPATIENT)
Dept: PRIMARY CARE | Facility: CLINIC | Age: 75
End: 2025-06-02
Payer: MEDICARE

## 2025-06-05 NOTE — TELEPHONE ENCOUNTER
Pt states she would like to have a  x ray for back and physical therapy  seeing if you can put in orders and  also pt still haven't received bed

## 2025-06-11 NOTE — TELEPHONE ENCOUNTER
Pt calling in would like to speak with you about referral made for neuro she would like a referral for [facility]       [phone]

## 2025-06-20 ENCOUNTER — TELEPHONE (OUTPATIENT)
Dept: PRIMARY CARE | Facility: CLINIC | Age: 75
End: 2025-06-20
Payer: MEDICARE

## 2025-06-20 NOTE — TELEPHONE ENCOUNTER
Nnamdi Cortes Physical therapist called in stating pt oxygen levels on last appt were 92-94% pt on  4-6 liters of oxygen when doing activities he states her oxygen levels does drop down to 80% on 6 liters of oxygen ,he states after sitting oxygen levels goes back up  94 %  also Bp after [id number] and at rest her bp is 140/68  He states pt is ok just wanted to give you updates oxygen levels ad vitals

## 2025-06-24 ENCOUNTER — APPOINTMENT (OUTPATIENT)
Dept: NEUROSURGERY | Facility: CLINIC | Age: 75
End: 2025-06-24
Payer: MEDICARE